# Patient Record
Sex: FEMALE | Race: BLACK OR AFRICAN AMERICAN | NOT HISPANIC OR LATINO | Employment: FULL TIME | URBAN - METROPOLITAN AREA
[De-identification: names, ages, dates, MRNs, and addresses within clinical notes are randomized per-mention and may not be internally consistent; named-entity substitution may affect disease eponyms.]

---

## 2018-04-04 ENCOUNTER — OFFICE VISIT CONVERTED (OUTPATIENT)
Dept: SURGERY | Facility: CLINIC | Age: 58
End: 2018-04-04
Attending: SURGERY

## 2018-05-03 ENCOUNTER — CONVERSION ENCOUNTER (OUTPATIENT)
Dept: GENERAL RADIOLOGY | Facility: HOSPITAL | Age: 58
End: 2018-05-03

## 2018-10-18 ENCOUNTER — HOSPITAL ENCOUNTER (OUTPATIENT)
Facility: HOSPITAL | Age: 58
Setting detail: HOSPITAL OUTPATIENT SURGERY
Discharge: HOME | End: 2018-10-18
Attending: INTERNAL MEDICINE | Admitting: INTERNAL MEDICINE

## 2018-10-18 ENCOUNTER — APPOINTMENT (OUTPATIENT)
Dept: GENERAL RADIOLOGY | Facility: HOSPITAL | Age: 58
End: 2018-10-18

## 2018-10-18 ENCOUNTER — ANESTHESIA EVENT (OUTPATIENT)
Dept: GASTROENTEROLOGY | Facility: HOSPITAL | Age: 58
End: 2018-10-18

## 2018-10-18 ENCOUNTER — ANESTHESIA (OUTPATIENT)
Dept: GASTROENTEROLOGY | Facility: HOSPITAL | Age: 58
End: 2018-10-18

## 2018-10-18 VITALS
BODY MASS INDEX: 33.49 KG/M2 | RESPIRATION RATE: 20 BRPM | DIASTOLIC BLOOD PRESSURE: 97 MMHG | TEMPERATURE: 97.9 F | SYSTOLIC BLOOD PRESSURE: 139 MMHG | WEIGHT: 189 LBS | HEIGHT: 63 IN | HEART RATE: 98 BPM | OXYGEN SATURATION: 99 %

## 2018-10-18 DIAGNOSIS — J84.9 INTERSTITIAL LUNG DISEASE (HCC): ICD-10-CM

## 2018-10-18 DIAGNOSIS — R06.2 WHEEZING: Primary | ICD-10-CM

## 2018-10-18 DIAGNOSIS — R05.3 CHRONIC COUGH: ICD-10-CM

## 2018-10-18 LAB
APPEARANCE FLD: ABNORMAL
COLOR FLD: ABNORMAL
GIE STN SPEC: NORMAL
GLUCOSE BLDC GLUCOMTR-MCNC: 127 MG/DL (ref 70–130)
LYMPHOCYTES NFR FLD MANUAL: 1 %
MONOS+MACROS NFR FLD: 91 %
NEUTROPHILS NFR FLD MANUAL: 8 %
OTHER CELLS FLUID PER 100/WBCS: 2 /100 WBCS
RBC # FLD AUTO: ABNORMAL /MM3
WBC # FLD: 23 /MM3

## 2018-10-18 PROCEDURE — 71045 X-RAY EXAM CHEST 1 VIEW: CPT

## 2018-10-18 PROCEDURE — 89051 BODY FLUID CELL COUNT: CPT | Performed by: INTERNAL MEDICINE

## 2018-10-18 PROCEDURE — 87206 SMEAR FLUORESCENT/ACID STAI: CPT | Performed by: INTERNAL MEDICINE

## 2018-10-18 PROCEDURE — 76000 FLUOROSCOPY <1 HR PHYS/QHP: CPT

## 2018-10-18 PROCEDURE — 88305 TISSUE EXAM BY PATHOLOGIST: CPT | Performed by: INTERNAL MEDICINE

## 2018-10-18 PROCEDURE — 25010000002 PROPOFOL 1000 MG/ML EMULSION: Performed by: ANESTHESIOLOGY

## 2018-10-18 PROCEDURE — 88112 CYTOPATH CELL ENHANCE TECH: CPT | Performed by: INTERNAL MEDICINE

## 2018-10-18 PROCEDURE — 87070 CULTURE OTHR SPECIMN AEROBIC: CPT | Performed by: INTERNAL MEDICINE

## 2018-10-18 PROCEDURE — 87071 CULTURE AEROBIC QUANT OTHER: CPT | Performed by: INTERNAL MEDICINE

## 2018-10-18 PROCEDURE — 87205 SMEAR GRAM STAIN: CPT | Performed by: INTERNAL MEDICINE

## 2018-10-18 PROCEDURE — 87102 FUNGUS ISOLATION CULTURE: CPT | Performed by: INTERNAL MEDICINE

## 2018-10-18 PROCEDURE — 82962 GLUCOSE BLOOD TEST: CPT

## 2018-10-18 PROCEDURE — 87176 TISSUE HOMOGENIZATION CULTR: CPT | Performed by: INTERNAL MEDICINE

## 2018-10-18 PROCEDURE — 25010000002 PROPOFOL 10 MG/ML EMULSION: Performed by: ANESTHESIOLOGY

## 2018-10-18 PROCEDURE — 87116 MYCOBACTERIA CULTURE: CPT | Performed by: INTERNAL MEDICINE

## 2018-10-18 RX ORDER — SUCRALFATE ORAL 1 G/10ML
1 SUSPENSION ORAL 4 TIMES DAILY
COMMUNITY
End: 2021-07-27

## 2018-10-18 RX ORDER — BACLOFEN 10 MG/1
10 TABLET ORAL 3 TIMES DAILY
COMMUNITY
End: 2022-04-04 | Stop reason: SDUPTHER

## 2018-10-18 RX ORDER — SODIUM CHLORIDE 0.9 % (FLUSH) 0.9 %
3 SYRINGE (ML) INJECTION EVERY 12 HOURS SCHEDULED
Status: DISCONTINUED | OUTPATIENT
Start: 2018-10-18 | End: 2018-10-18 | Stop reason: HOSPADM

## 2018-10-18 RX ORDER — LIDOCAINE HYDROCHLORIDE 10 MG/ML
INJECTION, SOLUTION EPIDURAL; INFILTRATION; INTRACAUDAL; PERINEURAL AS NEEDED
Status: DISCONTINUED | OUTPATIENT
Start: 2018-10-18 | End: 2018-10-18 | Stop reason: HOSPADM

## 2018-10-18 RX ORDER — PANTOPRAZOLE SODIUM 40 MG/1
40 TABLET, DELAYED RELEASE ORAL DAILY
Qty: 30 TABLET | Refills: 2 | Status: SHIPPED | OUTPATIENT
Start: 2018-10-18 | End: 2018-10-18

## 2018-10-18 RX ORDER — DICLOFENAC SODIUM AND MISOPROSTOL 75; 200 MG/1; UG/1
1 TABLET, DELAYED RELEASE ORAL 2 TIMES DAILY
COMMUNITY
End: 2021-07-27

## 2018-10-18 RX ORDER — LIDOCAINE HYDROCHLORIDE 20 MG/ML
INJECTION, SOLUTION INFILTRATION; PERINEURAL AS NEEDED
Status: DISCONTINUED | OUTPATIENT
Start: 2018-10-18 | End: 2018-10-18 | Stop reason: SURG

## 2018-10-18 RX ORDER — SODIUM CHLORIDE 9 MG/ML
125 INJECTION, SOLUTION INTRAVENOUS CONTINUOUS
Status: DISCONTINUED | OUTPATIENT
Start: 2018-10-18 | End: 2018-10-18 | Stop reason: HOSPADM

## 2018-10-18 RX ORDER — FERROUS SULFATE 325(65) MG
325 TABLET ORAL
COMMUNITY
End: 2022-04-04

## 2018-10-18 RX ORDER — ALBUTEROL SULFATE 2.5 MG/3ML
2.5 SOLUTION RESPIRATORY (INHALATION) 4 TIMES DAILY PRN
Qty: 25 VIAL | Refills: 5 | Status: SHIPPED | OUTPATIENT
Start: 2018-10-18 | End: 2018-10-18

## 2018-10-18 RX ORDER — PANTOPRAZOLE SODIUM 40 MG/1
40 TABLET, DELAYED RELEASE ORAL DAILY
Qty: 30 TABLET | Refills: 2 | Status: SHIPPED | OUTPATIENT
Start: 2018-10-18 | End: 2022-05-16

## 2018-10-18 RX ORDER — SODIUM CHLORIDE 0.9 % (FLUSH) 0.9 %
3-10 SYRINGE (ML) INJECTION AS NEEDED
Status: DISCONTINUED | OUTPATIENT
Start: 2018-10-18 | End: 2018-10-18 | Stop reason: HOSPADM

## 2018-10-18 RX ORDER — PROPOFOL 10 MG/ML
VIAL (ML) INTRAVENOUS AS NEEDED
Status: DISCONTINUED | OUTPATIENT
Start: 2018-10-18 | End: 2018-10-18 | Stop reason: SURG

## 2018-10-18 RX ORDER — LOSARTAN POTASSIUM AND HYDROCHLOROTHIAZIDE 12.5; 5 MG/1; MG/1
1 TABLET ORAL DAILY
COMMUNITY
End: 2021-07-27

## 2018-10-18 RX ORDER — ALBUTEROL SULFATE 2.5 MG/3ML
2.5 SOLUTION RESPIRATORY (INHALATION) 4 TIMES DAILY PRN
Qty: 25 VIAL | Refills: 5 | Status: SHIPPED | OUTPATIENT
Start: 2018-10-18 | End: 2022-05-16

## 2018-10-18 RX ADMIN — SODIUM CHLORIDE 125 ML/HR: 9 INJECTION, SOLUTION INTRAVENOUS at 10:43

## 2018-10-18 RX ADMIN — PROPOFOL 180 MG: 10 INJECTION, EMULSION INTRAVENOUS at 12:00

## 2018-10-18 RX ADMIN — LIDOCAINE HYDROCHLORIDE 60 MG: 20 INJECTION, SOLUTION INFILTRATION; PERINEURAL at 12:00

## 2018-10-18 RX ADMIN — PROPOFOL 200 MCG/KG/MIN: 10 INJECTION, EMULSION INTRAVENOUS at 12:00

## 2018-10-18 NOTE — ANESTHESIA POSTPROCEDURE EVALUATION
"Patient: Silvia Massey    Procedure Summary     Date:  10/18/18 Room / Location:  [facility] ENDOSCOPY 7 /  [facility]    Anesthesia Start:  1155 Anesthesia Stop:  1241    Procedure:  BRONCHOSCOPY WITH BIOPSIES, BAL (N/A Bronchus) Diagnosis:      Surgeon:  Rayray Black MD Provider:  Natalia Bender MD    Anesthesia Type:  general ASA Status:  3          Anesthesia Type: general  Last vitals  BP   (!) 144/104 (10/18/18 1044)   Temp   36.6 °C (97.9 °F) (10/18/18 1036)   Pulse   97 (10/18/18 1044)   Resp   18 (10/18/18 1036)     SpO2   97 % (10/18/18 1036)     Post Anesthesia Care and Evaluation    Patient location during evaluation: bedside  Patient participation: complete - patient participated  Level of consciousness: awake and alert  Pain management: adequate  Airway patency: patent  Anesthetic complications: No anesthetic complications  PONV Status: none  Cardiovascular status: acceptable  Respiratory status: acceptable  Hydration status: acceptable    Comments: BP (!) 144/104 (BP Location: Left arm, Patient Position: Lying)   Pulse 97   Temp 36.6 °C (97.9 °F) (Oral)   Resp 18   Ht 160 cm (63\")   Wt 85.7 kg (189 lb)   SpO2 97%   BMI 33.48 kg/m²         "

## 2018-10-18 NOTE — OP NOTE
Bronchoscopy Procedure Note    Procedure:  1. Bronchoscopy, Diagnostic    Pre-Operative Diagnosis:  SOA and chronic cough with interstitial lung disease on CT chest    Post-Operative Diagnosis: Same    Indication:  SOA and chronic cough with interstitial lung disease on CT chest    Anesthesia: Monitored Anesthesia Care (MAC)    Procedure Details: Patient was consented for the procedure with all risk and benefit of the procedure explained in detail.  Patient was given the opportunity to ask questions and all concerns were answered.  The bronchocope was inserted into the main airway via the LMA. An anatomical survey was done of the main airways and the subsegmental bronchus to at least the first subsegmental level of all five lobes of both lungs.  The findings are reported below.  A bronchoalveolar lavage was performed using aliquots of normal saline instilled into the airways then aspirated back.    Findings:  Bronchoscope passed through LMA to the level of the vocal cords.  Lidocaine used for local anesthetic over vocal cords.  Very irritated and injected vasculature of the area around the vocal cords and laryngeal pillars with signs of silent aspiration and reflux.  Bronchoscope was passed between the vocal cords into the trachea.  All airways were visualized to at least the first subsegment level of all 5 lobes of both lungs.  Airways were of normal size and caliber.  No endobronchial lesions seen.  Very irritated central airways noted with signs of chronic aspiration with GERD as the likely cause.      Fluoroscopically guided transbronchial biopsies performed in the left lower lobe x 7.  Bronchial alveolar lavage performed in the left lower lobe and right lower lobe with 180cc saline instilled and 65cc blood tinged return.      Estimated Blood Loss:  Minimal           Specimens:  As above                Complications:  None; patient tolerated the procedure well.           Disposition: PACU - hemodynamically  stable.      Patient tolerated the procedure well.    Rayray Black MD  10/18/2018  12:20 PM

## 2018-10-18 NOTE — ANESTHESIA PREPROCEDURE EVALUATION
Anesthesia Evaluation     Patient summary reviewed   NPO Solid Status: > 8 hours  NPO Liquid Status: > 8 hours           Airway   Mallampati: I  TM distance: >3 FB  Dental      Pulmonary    (+) pneumonia , shortness of breath, sleep apnea,   Cardiovascular     Rhythm: regular  Rate: normal    (+) hypertension,       Neuro/Psych  GI/Hepatic/Renal/Endo    (+) obesity,  GERD,  diabetes mellitus,     Musculoskeletal     Abdominal    Substance History      OB/GYN          Other                        Anesthesia Plan    ASA 3     general   total IV anesthesia  intravenous induction   Anesthetic plan, all risks, benefits, and alternatives have been provided, discussed and informed consent has been obtained with: patient.

## 2018-10-19 LAB
CYTO UR: NORMAL
LAB AP CASE REPORT: NORMAL
LAB AP INTRADEPARTMENTAL CONSULT: NORMAL
PATH REPORT.FINAL DX SPEC: NORMAL
PATH REPORT.GROSS SPEC: NORMAL
REF LAB TEST METHOD: NORMAL

## 2018-10-20 LAB
BACTERIA SPEC AEROBE CULT: NORMAL
GRAM STN SPEC: NORMAL

## 2018-10-21 LAB — BACTERIA SPEC AEROBE CULT: NORMAL

## 2018-10-30 LAB
CYTO UR: NORMAL
FUNGUS WND CULT: ABNORMAL
LAB AP CASE REPORT: NORMAL
LAB AP CLINICAL INFORMATION: NORMAL
PATH REPORT.ADDENDUM SPEC: NORMAL
PATH REPORT.FINAL DX SPEC: NORMAL
PATH REPORT.GROSS SPEC: NORMAL

## 2018-10-31 ENCOUNTER — CONVERSION ENCOUNTER (OUTPATIENT)
Dept: INTERNAL MEDICINE | Facility: CLINIC | Age: 58
End: 2018-10-31

## 2018-10-31 ENCOUNTER — OFFICE VISIT CONVERTED (OUTPATIENT)
Dept: INTERNAL MEDICINE | Facility: CLINIC | Age: 58
End: 2018-10-31
Attending: INTERNAL MEDICINE

## 2018-11-01 ENCOUNTER — OFFICE VISIT CONVERTED (OUTPATIENT)
Dept: CARDIOLOGY | Facility: CLINIC | Age: 58
End: 2018-11-01
Attending: INTERNAL MEDICINE

## 2018-11-15 LAB — FUNGUS WND CULT: NORMAL

## 2018-11-29 LAB
MYCOBACTERIUM SPEC CULT: NORMAL
MYCOBACTERIUM SPEC CULT: NORMAL
NIGHT BLUE STAIN TISS: NORMAL
NIGHT BLUE STAIN TISS: NORMAL

## 2018-12-05 ENCOUNTER — OFFICE VISIT CONVERTED (OUTPATIENT)
Dept: CARDIOLOGY | Facility: CLINIC | Age: 58
End: 2018-12-05
Attending: INTERNAL MEDICINE

## 2018-12-05 ENCOUNTER — OFFICE VISIT CONVERTED (OUTPATIENT)
Dept: INTERNAL MEDICINE | Facility: CLINIC | Age: 58
End: 2018-12-05
Attending: INTERNAL MEDICINE

## 2019-01-01 ENCOUNTER — HOSPITAL ENCOUNTER (OUTPATIENT)
Dept: OTHER | Facility: HOSPITAL | Age: 59
Setting detail: RECURRING SERIES
Discharge: HOME | End: 2019-01-01
Attending: INTERNAL MEDICINE

## 2019-01-09 ENCOUNTER — OFFICE VISIT CONVERTED (OUTPATIENT)
Dept: INTERNAL MEDICINE | Facility: CLINIC | Age: 59
End: 2019-01-09
Attending: INTERNAL MEDICINE

## 2019-01-18 ENCOUNTER — HOSPITAL ENCOUNTER (OUTPATIENT)
Dept: ULTRASOUND IMAGING | Facility: HOSPITAL | Age: 59
Discharge: HOME | End: 2019-01-18
Attending: INTERNAL MEDICINE

## 2019-02-01 ENCOUNTER — OFFICE VISIT CONVERTED (OUTPATIENT)
Dept: PULMONOLOGY | Facility: CLINIC | Age: 59
End: 2019-02-01
Attending: INTERNAL MEDICINE

## 2019-02-01 ENCOUNTER — HOSPITAL ENCOUNTER (OUTPATIENT)
Dept: LAB | Facility: HOSPITAL | Age: 59
Discharge: HOME | End: 2019-02-01
Attending: INTERNAL MEDICINE

## 2019-02-05 LAB — IGE SERPL-ACNC: 55 K[IU]/ML (ref 0–24)

## 2019-02-11 ENCOUNTER — OFFICE VISIT CONVERTED (OUTPATIENT)
Dept: INTERNAL MEDICINE | Facility: CLINIC | Age: 59
End: 2019-02-11
Attending: INTERNAL MEDICINE

## 2019-02-11 ENCOUNTER — HOSPITAL ENCOUNTER (OUTPATIENT)
Dept: OTHER | Facility: HOSPITAL | Age: 59
Discharge: HOME | End: 2019-02-11
Attending: INTERNAL MEDICINE

## 2019-02-11 ENCOUNTER — CONVERSION ENCOUNTER (OUTPATIENT)
Dept: INTERNAL MEDICINE | Facility: CLINIC | Age: 59
End: 2019-02-11

## 2019-02-11 LAB
ALBUMIN SERPL-MCNC: 4.8 G/DL (ref 3.5–5)
ALBUMIN/GLOB SERPL: 1.7 {RATIO} (ref 1.4–2.6)
ALP SERPL-CCNC: 79 U/L (ref 53–141)
ALT SERPL-CCNC: 16 U/L (ref 10–40)
ANION GAP SERPL CALC-SCNC: 17 MMOL/L (ref 8–19)
AST SERPL-CCNC: 15 U/L (ref 15–50)
BASOPHILS # BLD AUTO: 0.07 10*3/UL (ref 0–0.2)
BASOPHILS NFR BLD AUTO: 1.01 % (ref 0–3)
BILIRUB SERPL-MCNC: 0.28 MG/DL (ref 0.2–1.3)
BUN SERPL-MCNC: 16 MG/DL (ref 5–25)
BUN/CREAT SERPL: 16 {RATIO} (ref 6–20)
CALCIUM SERPL-MCNC: 10.3 MG/DL (ref 8.7–10.4)
CHLORIDE SERPL-SCNC: 105 MMOL/L (ref 99–111)
CHOLEST SERPL-MCNC: 116 MG/DL (ref 107–200)
CHOLEST/HDLC SERPL: 2 {RATIO} (ref 3–6)
CONV CO2: 26 MMOL/L (ref 22–32)
CONV CREATININE URINE, RANDOM: 60.9 MG/DL (ref 10–300)
CONV MICROALBUM.,U,RANDOM: <12 MG/L (ref 0–20)
CONV TOTAL PROTEIN: 7.6 G/DL (ref 6.3–8.2)
CREAT UR-MCNC: 1 MG/DL (ref 0.5–0.9)
EOSINOPHIL # BLD AUTO: 0.14 10*3/UL (ref 0–0.7)
EOSINOPHIL # BLD AUTO: 2.18 % (ref 0–7)
ERYTHROCYTE [DISTWIDTH] IN BLOOD BY AUTOMATED COUNT: 13.5 % (ref 11.5–14.5)
EST. AVERAGE GLUCOSE BLD GHB EST-MCNC: 169 MG/DL
GFR SERPLBLD BASED ON 1.73 SQ M-ARVRAT: >60 ML/MIN/{1.73_M2}
GLOBULIN UR ELPH-MCNC: 2.8 G/DL (ref 2–3.5)
GLUCOSE SERPL-MCNC: 183 MG/DL (ref 65–99)
HBA1C MFR BLD: 14.5 G/DL (ref 12–16)
HBA1C MFR BLD: 7.5 % (ref 3.5–5.7)
HCT VFR BLD AUTO: 42.8 % (ref 37–47)
HDLC SERPL-MCNC: 58 MG/DL (ref 40–60)
LDLC SERPL CALC-MCNC: 34 MG/DL (ref 70–100)
LYMPHOCYTES # BLD AUTO: 2.69 10*3/UL (ref 1–5)
MCH RBC QN AUTO: 28.4 PG (ref 27–31)
MCHC RBC AUTO-ENTMCNC: 33.8 G/DL (ref 33–37)
MCV RBC AUTO: 84.2 FL (ref 81–99)
MICROALBUMIN/CREAT UR: 19.7 MG/G{CRE} (ref 0–35)
MONOCYTES # BLD AUTO: 0.35 10*3/UL (ref 0.2–1.2)
MONOCYTES NFR BLD AUTO: 5.37 % (ref 3–10)
NEUTROPHILS # BLD AUTO: 3.26 10*3/UL (ref 2–8)
NEUTROPHILS NFR BLD AUTO: 50.1 % (ref 30–85)
NRBC BLD AUTO-RTO: 0 % (ref 0–0.01)
OSMOLALITY SERPL CALC.SUM OF ELEC: 304 MOSM/KG (ref 273–304)
PLATELET # BLD AUTO: 210 10*3/UL (ref 130–400)
PMV BLD AUTO: 8.8 FL (ref 7.4–10.4)
POTASSIUM SERPL-SCNC: 4.3 MMOL/L (ref 3.5–5.3)
RBC # BLD AUTO: 5.08 10*6/UL (ref 4.2–5.4)
SODIUM SERPL-SCNC: 144 MMOL/L (ref 135–147)
TRIGL SERPL-MCNC: 118 MG/DL (ref 40–150)
VARIANT LYMPHS NFR BLD MANUAL: 41.3 % (ref 20–45)
VLDLC SERPL-MCNC: 24 MG/DL (ref 5–37)
WBC # BLD AUTO: 6.51 10*3/UL (ref 4.8–10.8)

## 2019-02-26 ENCOUNTER — HOSPITAL ENCOUNTER (OUTPATIENT)
Dept: CARDIOLOGY | Facility: HOSPITAL | Age: 59
Discharge: HOME | End: 2019-02-26
Attending: INTERNAL MEDICINE

## 2019-02-26 ENCOUNTER — OFFICE VISIT CONVERTED (OUTPATIENT)
Dept: SURGERY | Facility: CLINIC | Age: 59
End: 2019-02-26
Attending: SURGERY

## 2019-03-11 ENCOUNTER — OFFICE VISIT CONVERTED (OUTPATIENT)
Dept: PLASTIC SURGERY | Facility: CLINIC | Age: 59
End: 2019-03-11
Attending: PLASTIC SURGERY

## 2019-03-15 ENCOUNTER — HOSPITAL ENCOUNTER (OUTPATIENT)
Dept: GENERAL RADIOLOGY | Facility: HOSPITAL | Age: 59
Discharge: HOME | End: 2019-03-15
Attending: PLASTIC SURGERY

## 2019-04-01 ENCOUNTER — OFFICE VISIT CONVERTED (OUTPATIENT)
Dept: PLASTIC SURGERY | Facility: CLINIC | Age: 59
End: 2019-04-01
Attending: PLASTIC SURGERY

## 2019-04-03 ENCOUNTER — HOSPITAL ENCOUNTER (OUTPATIENT)
Dept: OTHER | Facility: HOSPITAL | Age: 59
Discharge: HOME | End: 2019-04-03
Attending: INTERNAL MEDICINE

## 2019-04-03 LAB
ANION GAP SERPL CALC-SCNC: 21 MMOL/L (ref 8–19)
BUN SERPL-MCNC: 27 MG/DL (ref 5–25)
BUN/CREAT SERPL: 27 {RATIO} (ref 6–20)
CALCIUM SERPL-MCNC: 10.3 MG/DL (ref 8.7–10.4)
CHLORIDE SERPL-SCNC: 98 MMOL/L (ref 99–111)
CONV CO2: 22 MMOL/L (ref 22–32)
CREAT UR-MCNC: 1 MG/DL (ref 0.5–0.9)
GFR SERPLBLD BASED ON 1.73 SQ M-ARVRAT: >60 ML/MIN/{1.73_M2}
GLUCOSE SERPL-MCNC: 133 MG/DL (ref 65–99)
OSMOLALITY SERPL CALC.SUM OF ELEC: 289 MOSM/KG (ref 273–304)
POTASSIUM SERPL-SCNC: 4.5 MMOL/L (ref 3.5–5.3)
SODIUM SERPL-SCNC: 136 MMOL/L (ref 135–147)

## 2019-04-10 ENCOUNTER — OFFICE VISIT CONVERTED (OUTPATIENT)
Dept: INTERNAL MEDICINE | Facility: CLINIC | Age: 59
End: 2019-04-10
Attending: INTERNAL MEDICINE

## 2019-04-10 ENCOUNTER — OFFICE VISIT CONVERTED (OUTPATIENT)
Dept: CARDIOLOGY | Facility: CLINIC | Age: 59
End: 2019-04-10
Attending: INTERNAL MEDICINE

## 2019-05-15 ENCOUNTER — OFFICE VISIT CONVERTED (OUTPATIENT)
Dept: INTERNAL MEDICINE | Facility: CLINIC | Age: 59
End: 2019-05-15
Attending: INTERNAL MEDICINE

## 2019-05-15 ENCOUNTER — CONVERSION ENCOUNTER (OUTPATIENT)
Dept: INTERNAL MEDICINE | Facility: CLINIC | Age: 59
End: 2019-05-15

## 2019-05-15 ENCOUNTER — HOSPITAL ENCOUNTER (OUTPATIENT)
Dept: OTHER | Facility: HOSPITAL | Age: 59
Discharge: HOME | End: 2019-05-15
Attending: INTERNAL MEDICINE

## 2019-05-15 LAB
ALBUMIN SERPL-MCNC: 4.8 G/DL (ref 3.5–5)
ALBUMIN/GLOB SERPL: 1.7 {RATIO} (ref 1.4–2.6)
ALP SERPL-CCNC: 65 U/L (ref 53–141)
ALT SERPL-CCNC: 15 U/L (ref 10–40)
ANION GAP SERPL CALC-SCNC: 17 MMOL/L (ref 8–19)
AST SERPL-CCNC: 17 U/L (ref 15–50)
BASOPHILS # BLD AUTO: 0.06 10*3/UL (ref 0–0.2)
BASOPHILS NFR BLD AUTO: 1.2 % (ref 0–3)
BILIRUB SERPL-MCNC: 0.32 MG/DL (ref 0.2–1.3)
BUN SERPL-MCNC: 18 MG/DL (ref 5–25)
BUN/CREAT SERPL: 23 {RATIO} (ref 6–20)
CALCIUM SERPL-MCNC: 10.4 MG/DL (ref 8.7–10.4)
CHLORIDE SERPL-SCNC: 105 MMOL/L (ref 99–111)
CHOLEST SERPL-MCNC: 134 MG/DL (ref 107–200)
CHOLEST/HDLC SERPL: 2.1 {RATIO} (ref 3–6)
CONV ABS IMM GRAN: 0 10*3/UL (ref 0–0.2)
CONV CO2: 25 MMOL/L (ref 22–32)
CONV IMMATURE GRAN: 0 % (ref 0–1.8)
CONV TOTAL PROTEIN: 7.6 G/DL (ref 6.3–8.2)
CREAT UR-MCNC: 0.77 MG/DL (ref 0.5–0.9)
DEPRECATED RDW RBC AUTO: 42.5 FL (ref 36.4–46.3)
EOSINOPHIL # BLD AUTO: 0.13 10*3/UL (ref 0–0.7)
EOSINOPHIL # BLD AUTO: 2.6 % (ref 0–7)
ERYTHROCYTE [DISTWIDTH] IN BLOOD BY AUTOMATED COUNT: 13.7 % (ref 11.7–14.4)
EST. AVERAGE GLUCOSE BLD GHB EST-MCNC: 154 MG/DL
GFR SERPLBLD BASED ON 1.73 SQ M-ARVRAT: >60 ML/MIN/{1.73_M2}
GLOBULIN UR ELPH-MCNC: 2.8 G/DL (ref 2–3.5)
GLUCOSE SERPL-MCNC: 114 MG/DL (ref 65–99)
HBA1C MFR BLD: 13.6 G/DL (ref 12–16)
HBA1C MFR BLD: 7 % (ref 3.5–5.7)
HCT VFR BLD AUTO: 43 % (ref 37–47)
HDLC SERPL-MCNC: 65 MG/DL (ref 40–60)
LDLC SERPL CALC-MCNC: 55 MG/DL (ref 70–100)
LYMPHOCYTES # BLD AUTO: 2.09 10*3/UL (ref 1–5)
MCH RBC QN AUTO: 26.9 PG (ref 27–31)
MCHC RBC AUTO-ENTMCNC: 31.6 G/DL (ref 33–37)
MCV RBC AUTO: 85.1 FL (ref 81–99)
MONOCYTES # BLD AUTO: 0.3 10*3/UL (ref 0.2–1.2)
MONOCYTES NFR BLD AUTO: 6 % (ref 3–10)
NEUTROPHILS # BLD AUTO: 2.45 10*3/UL (ref 2–8)
NEUTROPHILS NFR BLD AUTO: 48.6 % (ref 30–85)
NRBC CBCN: 0 % (ref 0–0.7)
OSMOLALITY SERPL CALC.SUM OF ELEC: 299 MOSM/KG (ref 273–304)
PLATELET # BLD AUTO: 224 10*3/UL (ref 130–400)
PMV BLD AUTO: 11.6 FL (ref 9.4–12.3)
POTASSIUM SERPL-SCNC: 4.3 MMOL/L (ref 3.5–5.3)
RBC # BLD AUTO: 5.05 10*6/UL (ref 4.2–5.4)
SODIUM SERPL-SCNC: 143 MMOL/L (ref 135–147)
TRIGL SERPL-MCNC: 71 MG/DL (ref 40–150)
VARIANT LYMPHS NFR BLD MANUAL: 41.6 % (ref 20–45)
VLDLC SERPL-MCNC: 14 MG/DL (ref 5–37)
WBC # BLD AUTO: 5.03 10*3/UL (ref 4.8–10.8)

## 2019-05-16 LAB
25(OH)D3 SERPL-MCNC: 32.7 NG/ML (ref 30–100)
FERRITIN SERPL-MCNC: 289 NG/ML (ref 10–200)
IRON SATN MFR SERPL: 24 % (ref 20–55)
IRON SERPL-MCNC: 96 UG/DL (ref 60–170)
TIBC SERPL-MCNC: 398 UG/DL (ref 245–450)
TRANSFERRIN SERPL-MCNC: 278 MG/DL (ref 250–380)

## 2019-05-31 ENCOUNTER — OFFICE VISIT CONVERTED (OUTPATIENT)
Dept: OTOLARYNGOLOGY | Facility: CLINIC | Age: 59
End: 2019-05-31
Attending: OTOLARYNGOLOGY

## 2019-06-17 ENCOUNTER — HOSPITAL ENCOUNTER (OUTPATIENT)
Dept: GENERAL RADIOLOGY | Facility: HOSPITAL | Age: 59
Discharge: HOME | End: 2019-06-17
Attending: INTERNAL MEDICINE

## 2019-06-24 ENCOUNTER — OFFICE VISIT CONVERTED (OUTPATIENT)
Dept: PLASTIC SURGERY | Facility: CLINIC | Age: 59
End: 2019-06-24
Attending: PLASTIC SURGERY

## 2019-06-24 ENCOUNTER — CONVERSION ENCOUNTER (OUTPATIENT)
Dept: PLASTIC SURGERY | Facility: CLINIC | Age: 59
End: 2019-06-24

## 2019-07-09 ENCOUNTER — PROCEDURE VISIT CONVERTED (OUTPATIENT)
Dept: OTHER | Facility: HOSPITAL | Age: 59
End: 2019-07-09
Attending: PLASTIC SURGERY

## 2019-07-09 ENCOUNTER — HOSPITAL ENCOUNTER (OUTPATIENT)
Dept: PERIOP | Facility: HOSPITAL | Age: 59
Setting detail: HOSPITAL OUTPATIENT SURGERY
Discharge: HOME | End: 2019-07-09
Attending: PLASTIC SURGERY

## 2019-07-09 LAB
GLUCOSE BLD-MCNC: 112 MG/DL (ref 65–99)
GLUCOSE BLD-MCNC: 123 MG/DL (ref 65–99)

## 2019-07-19 ENCOUNTER — OFFICE VISIT CONVERTED (OUTPATIENT)
Dept: PLASTIC SURGERY | Facility: CLINIC | Age: 59
End: 2019-07-19
Attending: PLASTIC SURGERY

## 2019-07-26 ENCOUNTER — OFFICE VISIT CONVERTED (OUTPATIENT)
Dept: PLASTIC SURGERY | Facility: CLINIC | Age: 59
End: 2019-07-26
Attending: PLASTIC SURGERY

## 2019-08-23 ENCOUNTER — OFFICE VISIT CONVERTED (OUTPATIENT)
Dept: OTOLARYNGOLOGY | Facility: CLINIC | Age: 59
End: 2019-08-23
Attending: OTOLARYNGOLOGY

## 2019-08-26 ENCOUNTER — OFFICE VISIT CONVERTED (OUTPATIENT)
Dept: PLASTIC SURGERY | Facility: CLINIC | Age: 59
End: 2019-08-26
Attending: PLASTIC SURGERY

## 2019-09-25 ENCOUNTER — CONVERSION ENCOUNTER (OUTPATIENT)
Dept: NEUROLOGY | Facility: CLINIC | Age: 59
End: 2019-09-25

## 2019-09-25 ENCOUNTER — OFFICE VISIT CONVERTED (OUTPATIENT)
Dept: NEUROLOGY | Facility: CLINIC | Age: 59
End: 2019-09-25
Attending: PSYCHIATRY & NEUROLOGY

## 2019-10-15 ENCOUNTER — CONVERSION ENCOUNTER (OUTPATIENT)
Dept: CARDIOLOGY | Facility: CLINIC | Age: 59
End: 2019-10-15

## 2019-10-15 ENCOUNTER — CONVERSION ENCOUNTER (OUTPATIENT)
Dept: CARDIOLOGY | Facility: CLINIC | Age: 59
End: 2019-10-15
Attending: INTERNAL MEDICINE

## 2019-10-23 ENCOUNTER — HOSPITAL ENCOUNTER (OUTPATIENT)
Dept: GENERAL RADIOLOGY | Facility: HOSPITAL | Age: 59
Discharge: HOME | End: 2019-10-23

## 2019-10-23 ENCOUNTER — HOSPITAL ENCOUNTER (OUTPATIENT)
Dept: LAB | Facility: HOSPITAL | Age: 59
Discharge: HOME | End: 2019-10-23

## 2019-10-23 LAB
ALBUMIN SERPL-MCNC: 4.9 G/DL (ref 3.5–5)
ALBUMIN/GLOB SERPL: 1.8 {RATIO} (ref 1.4–2.6)
ALP SERPL-CCNC: 74 U/L (ref 53–141)
ALT SERPL-CCNC: 15 U/L (ref 10–40)
ANION GAP SERPL CALC-SCNC: 20 MMOL/L (ref 8–19)
AST SERPL-CCNC: 15 U/L (ref 15–50)
BASOPHILS # BLD AUTO: 0.06 10*3/UL (ref 0–0.2)
BASOPHILS NFR BLD AUTO: 1.1 % (ref 0–3)
BILIRUB SERPL-MCNC: 0.23 MG/DL (ref 0.2–1.3)
BUN SERPL-MCNC: 14 MG/DL (ref 5–25)
BUN/CREAT SERPL: 17 {RATIO} (ref 6–20)
CALCIUM SERPL-MCNC: 10.1 MG/DL (ref 8.7–10.4)
CHLORIDE SERPL-SCNC: 102 MMOL/L (ref 99–111)
CONV ABS IMM GRAN: 0 10*3/UL (ref 0–0.2)
CONV CO2: 24 MMOL/L (ref 22–32)
CONV IMMATURE GRAN: 0 % (ref 0–1.8)
CONV TOTAL PROTEIN: 7.6 G/DL (ref 6.3–8.2)
CREAT UR-MCNC: 0.83 MG/DL (ref 0.5–0.9)
DEPRECATED RDW RBC AUTO: 39.8 FL (ref 36.4–46.3)
EOSINOPHIL # BLD AUTO: 0.16 10*3/UL (ref 0–0.7)
EOSINOPHIL # BLD AUTO: 2.8 % (ref 0–7)
ERYTHROCYTE [DISTWIDTH] IN BLOOD BY AUTOMATED COUNT: 13 % (ref 11.7–14.4)
GFR SERPLBLD BASED ON 1.73 SQ M-ARVRAT: >60 ML/MIN/{1.73_M2}
GLOBULIN UR ELPH-MCNC: 2.7 G/DL (ref 2–3.5)
GLUCOSE SERPL-MCNC: 172 MG/DL (ref 65–99)
HCT VFR BLD AUTO: 39.7 % (ref 37–47)
HCYS SERPL-SCNC: 8.8 UMOL/L (ref 3.7–13.9)
HGB BLD-MCNC: 13 G/DL (ref 12–16)
LYMPHOCYTES # BLD AUTO: 2.4 10*3/UL (ref 1–5)
LYMPHOCYTES NFR BLD AUTO: 42.6 % (ref 20–45)
MCH RBC QN AUTO: 27.7 PG (ref 27–31)
MCHC RBC AUTO-ENTMCNC: 32.7 G/DL (ref 33–37)
MCV RBC AUTO: 84.5 FL (ref 81–99)
MONOCYTES # BLD AUTO: 0.37 10*3/UL (ref 0.2–1.2)
MONOCYTES NFR BLD AUTO: 6.6 % (ref 3–10)
NEUTROPHILS # BLD AUTO: 2.64 10*3/UL (ref 2–8)
NEUTROPHILS NFR BLD AUTO: 46.9 % (ref 30–85)
NRBC CBCN: 0 % (ref 0–0.7)
OSMOLALITY SERPL CALC.SUM OF ELEC: 299 MOSM/KG (ref 273–304)
PLATELET # BLD AUTO: 225 10*3/UL (ref 130–400)
PMV BLD AUTO: 11.4 FL (ref 9.4–12.3)
POTASSIUM SERPL-SCNC: 3.8 MMOL/L (ref 3.5–5.3)
RBC # BLD AUTO: 4.7 10*6/UL (ref 4.2–5.4)
SODIUM SERPL-SCNC: 142 MMOL/L (ref 135–147)
TSH SERPL-ACNC: 1.17 M[IU]/L (ref 0.27–4.2)
VIT B12 SERPL-MCNC: 996 PG/ML (ref 211–911)
WBC # BLD AUTO: 5.63 10*3/UL (ref 4.8–10.8)

## 2019-10-24 LAB
25(OH)D3 SERPL-MCNC: 23.7 NG/ML (ref 30–100)
CHOLEST SERPL-MCNC: 112 MG/DL (ref 107–200)
CHOLEST/HDLC SERPL: 1.8 {RATIO} (ref 3–6)
EST. AVERAGE GLUCOSE BLD GHB EST-MCNC: 163 MG/DL
HBA1C MFR BLD: 7.3 % (ref 3.5–5.7)
HDLC SERPL-MCNC: 61 MG/DL (ref 40–60)
IRON SATN MFR SERPL: 18 % (ref 20–55)
IRON SERPL-MCNC: 69 UG/DL (ref 60–170)
LDLC SERPL CALC-MCNC: 37 MG/DL (ref 70–100)
TIBC SERPL-MCNC: 373 UG/DL (ref 245–450)
TRANSFERRIN SERPL-MCNC: 261 MG/DL (ref 250–380)
TRIGL SERPL-MCNC: 71 MG/DL (ref 40–150)
VLDLC SERPL-MCNC: 14 MG/DL (ref 5–37)

## 2019-10-28 ENCOUNTER — OFFICE VISIT CONVERTED (OUTPATIENT)
Dept: PLASTIC SURGERY | Facility: CLINIC | Age: 59
End: 2019-10-28
Attending: PLASTIC SURGERY

## 2019-11-18 ENCOUNTER — OFFICE VISIT CONVERTED (OUTPATIENT)
Dept: INTERNAL MEDICINE | Facility: CLINIC | Age: 59
End: 2019-11-18
Attending: INTERNAL MEDICINE

## 2019-12-09 ENCOUNTER — HOSPITAL ENCOUNTER (OUTPATIENT)
Dept: OTHER | Facility: HOSPITAL | Age: 59
Discharge: HOME | End: 2019-12-09
Attending: PHYSICIAN ASSISTANT

## 2019-12-09 ENCOUNTER — OFFICE VISIT CONVERTED (OUTPATIENT)
Dept: INTERNAL MEDICINE | Facility: CLINIC | Age: 59
End: 2019-12-09
Attending: PHYSICIAN ASSISTANT

## 2019-12-11 LAB — BACTERIA SPEC AEROBE CULT: NORMAL

## 2020-03-04 ENCOUNTER — OFFICE VISIT CONVERTED (OUTPATIENT)
Dept: CARDIOLOGY | Facility: CLINIC | Age: 60
End: 2020-03-04
Attending: INTERNAL MEDICINE

## 2020-03-10 ENCOUNTER — HOSPITAL ENCOUNTER (OUTPATIENT)
Dept: OTHER | Facility: HOSPITAL | Age: 60
Discharge: HOME | End: 2020-03-10
Attending: INTERNAL MEDICINE

## 2020-03-10 ENCOUNTER — OFFICE VISIT CONVERTED (OUTPATIENT)
Dept: INTERNAL MEDICINE | Facility: CLINIC | Age: 60
End: 2020-03-10
Attending: INTERNAL MEDICINE

## 2020-03-10 LAB
ALBUMIN SERPL-MCNC: 4.8 G/DL (ref 3.5–5)
ALBUMIN/GLOB SERPL: 1.7 {RATIO} (ref 1.4–2.6)
ALP SERPL-CCNC: 76 U/L (ref 53–141)
ALT SERPL-CCNC: 31 U/L (ref 10–40)
ANION GAP SERPL CALC-SCNC: 16 MMOL/L (ref 8–19)
AST SERPL-CCNC: 21 U/L (ref 15–50)
BASOPHILS # BLD AUTO: 0.07 10*3/UL (ref 0–0.2)
BASOPHILS NFR BLD AUTO: 1.3 % (ref 0–3)
BILIRUB SERPL-MCNC: 0.31 MG/DL (ref 0.2–1.3)
BUN SERPL-MCNC: 10 MG/DL (ref 5–25)
BUN/CREAT SERPL: 14 {RATIO} (ref 6–20)
CALCIUM SERPL-MCNC: 10.2 MG/DL (ref 8.7–10.4)
CHLORIDE SERPL-SCNC: 101 MMOL/L (ref 99–111)
CHOLEST SERPL-MCNC: 106 MG/DL (ref 107–200)
CHOLEST/HDLC SERPL: 2 {RATIO} (ref 3–6)
CONV ABS IMM GRAN: 0.01 10*3/UL (ref 0–0.2)
CONV CO2: 26 MMOL/L (ref 22–32)
CONV CREATININE URINE, RANDOM: 90.1 MG/DL (ref 10–300)
CONV IMMATURE GRAN: 0.2 % (ref 0–1.8)
CONV MICROALBUM.,U,RANDOM: <12 MG/L (ref 0–20)
CONV TOTAL PROTEIN: 7.6 G/DL (ref 6.3–8.2)
CREAT UR-MCNC: 0.7 MG/DL (ref 0.5–0.9)
DEPRECATED RDW RBC AUTO: 42.5 FL (ref 36.4–46.3)
EOSINOPHIL # BLD AUTO: 0.14 10*3/UL (ref 0–0.7)
EOSINOPHIL # BLD AUTO: 2.6 % (ref 0–7)
ERYTHROCYTE [DISTWIDTH] IN BLOOD BY AUTOMATED COUNT: 13.6 % (ref 11.7–14.4)
EST. AVERAGE GLUCOSE BLD GHB EST-MCNC: 235 MG/DL
GFR SERPLBLD BASED ON 1.73 SQ M-ARVRAT: >60 ML/MIN/{1.73_M2}
GLOBULIN UR ELPH-MCNC: 2.8 G/DL (ref 2–3.5)
GLUCOSE SERPL-MCNC: 122 MG/DL (ref 65–99)
HBA1C MFR BLD: 9.8 % (ref 3.5–5.7)
HCT VFR BLD AUTO: 42.5 % (ref 37–47)
HDLC SERPL-MCNC: 52 MG/DL (ref 40–60)
HGB BLD-MCNC: 13.1 G/DL (ref 12–16)
LDLC SERPL CALC-MCNC: 36 MG/DL (ref 70–100)
LYMPHOCYTES # BLD AUTO: 2.19 10*3/UL (ref 1–5)
LYMPHOCYTES NFR BLD AUTO: 41.4 % (ref 20–45)
MCH RBC QN AUTO: 26.4 PG (ref 27–31)
MCHC RBC AUTO-ENTMCNC: 30.8 G/DL (ref 33–37)
MCV RBC AUTO: 85.7 FL (ref 81–99)
MICROALBUMIN/CREAT UR: 13.3 MG/G{CRE} (ref 0–35)
MONOCYTES # BLD AUTO: 0.36 10*3/UL (ref 0.2–1.2)
MONOCYTES NFR BLD AUTO: 6.8 % (ref 3–10)
NEUTROPHILS # BLD AUTO: 2.52 10*3/UL (ref 2–8)
NEUTROPHILS NFR BLD AUTO: 47.7 % (ref 30–85)
NRBC CBCN: 0 % (ref 0–0.7)
OSMOLALITY SERPL CALC.SUM OF ELEC: 288 MOSM/KG (ref 273–304)
PLATELET # BLD AUTO: 229 10*3/UL (ref 130–400)
PMV BLD AUTO: 11.3 FL (ref 9.4–12.3)
POTASSIUM SERPL-SCNC: 4.4 MMOL/L (ref 3.5–5.3)
RBC # BLD AUTO: 4.96 10*6/UL (ref 4.2–5.4)
SODIUM SERPL-SCNC: 139 MMOL/L (ref 135–147)
TRIGL SERPL-MCNC: 88 MG/DL (ref 40–150)
VLDLC SERPL-MCNC: 18 MG/DL (ref 5–37)
WBC # BLD AUTO: 5.29 10*3/UL (ref 4.8–10.8)

## 2020-04-06 ENCOUNTER — TELEMEDICINE CONVERTED (OUTPATIENT)
Dept: INTERNAL MEDICINE | Facility: CLINIC | Age: 60
End: 2020-04-06
Attending: PHYSICIAN ASSISTANT

## 2020-04-24 ENCOUNTER — TELEMEDICINE CONVERTED (OUTPATIENT)
Dept: INTERNAL MEDICINE | Facility: CLINIC | Age: 60
End: 2020-04-24
Attending: INTERNAL MEDICINE

## 2020-09-11 ENCOUNTER — OFFICE VISIT CONVERTED (OUTPATIENT)
Dept: INTERNAL MEDICINE | Facility: CLINIC | Age: 60
End: 2020-09-11
Attending: INTERNAL MEDICINE

## 2020-09-11 ENCOUNTER — HOSPITAL ENCOUNTER (OUTPATIENT)
Dept: OTHER | Facility: HOSPITAL | Age: 60
Discharge: HOME | End: 2020-09-11
Attending: INTERNAL MEDICINE

## 2020-09-11 ENCOUNTER — CONVERSION ENCOUNTER (OUTPATIENT)
Dept: INTERNAL MEDICINE | Facility: CLINIC | Age: 60
End: 2020-09-11

## 2020-09-11 LAB
BASOPHILS # BLD AUTO: 0.08 10*3/UL (ref 0–0.2)
BASOPHILS NFR BLD AUTO: 1 % (ref 0–3)
CONV ABS IMM GRAN: 0.02 10*3/UL (ref 0–0.2)
CONV IMMATURE GRAN: 0.3 % (ref 0–1.8)
DEPRECATED RDW RBC AUTO: 45.4 FL (ref 36.4–46.3)
EOSINOPHIL # BLD AUTO: 0.12 10*3/UL (ref 0–0.7)
EOSINOPHIL # BLD AUTO: 1.5 % (ref 0–7)
ERYTHROCYTE [DISTWIDTH] IN BLOOD BY AUTOMATED COUNT: 14.2 % (ref 11.7–14.4)
HCT VFR BLD AUTO: 43.8 % (ref 37–47)
HGB BLD-MCNC: 13.5 G/DL (ref 12–16)
LYMPHOCYTES # BLD AUTO: 2.45 10*3/UL (ref 1–5)
LYMPHOCYTES NFR BLD AUTO: 31.3 % (ref 20–45)
MCH RBC QN AUTO: 27.3 PG (ref 27–31)
MCHC RBC AUTO-ENTMCNC: 30.8 G/DL (ref 33–37)
MCV RBC AUTO: 88.5 FL (ref 81–99)
MONOCYTES # BLD AUTO: 0.62 10*3/UL (ref 0.2–1.2)
MONOCYTES NFR BLD AUTO: 7.9 % (ref 3–10)
NEUTROPHILS # BLD AUTO: 4.53 10*3/UL (ref 2–8)
NEUTROPHILS NFR BLD AUTO: 58 % (ref 30–85)
NRBC CBCN: 0 % (ref 0–0.7)
PLATELET # BLD AUTO: 246 10*3/UL (ref 130–400)
PMV BLD AUTO: 11.9 FL (ref 9.4–12.3)
RBC # BLD AUTO: 4.95 10*6/UL (ref 4.2–5.4)
WBC # BLD AUTO: 7.82 10*3/UL (ref 4.8–10.8)

## 2020-09-12 LAB
25(OH)D3 SERPL-MCNC: 28.8 NG/ML (ref 30–100)
ALBUMIN SERPL-MCNC: 4.8 G/DL (ref 3.5–5)
ALBUMIN/GLOB SERPL: 1.8 {RATIO} (ref 1.4–2.6)
ALP SERPL-CCNC: 59 U/L (ref 53–141)
ALT SERPL-CCNC: 15 U/L (ref 10–40)
ANION GAP SERPL CALC-SCNC: 16 MMOL/L (ref 8–19)
AST SERPL-CCNC: 17 U/L (ref 15–50)
BILIRUB SERPL-MCNC: 0.36 MG/DL (ref 0.2–1.3)
BUN SERPL-MCNC: 14 MG/DL (ref 5–25)
BUN/CREAT SERPL: 18 {RATIO} (ref 6–20)
CALCIUM SERPL-MCNC: 9.7 MG/DL (ref 8.7–10.4)
CHLORIDE SERPL-SCNC: 104 MMOL/L (ref 99–111)
CHOLEST SERPL-MCNC: 117 MG/DL (ref 107–200)
CHOLEST/HDLC SERPL: 1.8 {RATIO} (ref 3–6)
CONV CO2: 25 MMOL/L (ref 22–32)
CONV TOTAL PROTEIN: 7.4 G/DL (ref 6.3–8.2)
CREAT UR-MCNC: 0.8 MG/DL (ref 0.5–0.9)
EST. AVERAGE GLUCOSE BLD GHB EST-MCNC: 180 MG/DL
GFR SERPLBLD BASED ON 1.73 SQ M-ARVRAT: >60 ML/MIN/{1.73_M2}
GLOBULIN UR ELPH-MCNC: 2.6 G/DL (ref 2–3.5)
GLUCOSE SERPL-MCNC: 86 MG/DL (ref 65–99)
HBA1C MFR BLD: 7.9 % (ref 3.5–5.7)
HDLC SERPL-MCNC: 64 MG/DL (ref 40–60)
LDLC SERPL CALC-MCNC: 35 MG/DL (ref 70–100)
OSMOLALITY SERPL CALC.SUM OF ELEC: 292 MOSM/KG (ref 273–304)
POTASSIUM SERPL-SCNC: 3.8 MMOL/L (ref 3.5–5.3)
SODIUM SERPL-SCNC: 141 MMOL/L (ref 135–147)
TRIGL SERPL-MCNC: 90 MG/DL (ref 40–150)
VLDLC SERPL-MCNC: 18 MG/DL (ref 5–37)

## 2020-09-24 ENCOUNTER — CONVERSION ENCOUNTER (OUTPATIENT)
Dept: CARDIOLOGY | Facility: CLINIC | Age: 60
End: 2020-09-24

## 2020-09-24 ENCOUNTER — OFFICE VISIT CONVERTED (OUTPATIENT)
Dept: CARDIOLOGY | Facility: CLINIC | Age: 60
End: 2020-09-24
Attending: INTERNAL MEDICINE

## 2020-11-11 ENCOUNTER — HOSPITAL ENCOUNTER (OUTPATIENT)
Dept: GENERAL RADIOLOGY | Facility: HOSPITAL | Age: 60
Discharge: HOME | End: 2020-11-11
Attending: INTERNAL MEDICINE

## 2020-12-08 ENCOUNTER — OFFICE VISIT CONVERTED (OUTPATIENT)
Dept: INTERNAL MEDICINE | Facility: CLINIC | Age: 60
End: 2020-12-08
Attending: STUDENT IN AN ORGANIZED HEALTH CARE EDUCATION/TRAINING PROGRAM

## 2020-12-17 ENCOUNTER — HOSPITAL ENCOUNTER (OUTPATIENT)
Dept: OTHER | Facility: HOSPITAL | Age: 60
Discharge: HOME | End: 2020-12-17
Attending: INTERNAL MEDICINE

## 2020-12-17 ENCOUNTER — OFFICE VISIT CONVERTED (OUTPATIENT)
Dept: INTERNAL MEDICINE | Facility: CLINIC | Age: 60
End: 2020-12-17
Attending: INTERNAL MEDICINE

## 2020-12-17 LAB
ALBUMIN SERPL-MCNC: 4.8 G/DL (ref 3.5–5)
ALBUMIN/GLOB SERPL: 1.8 {RATIO} (ref 1.4–2.6)
ALP SERPL-CCNC: 73 U/L (ref 53–141)
ALT SERPL-CCNC: 31 U/L (ref 10–40)
ANION GAP SERPL CALC-SCNC: 16 MMOL/L (ref 8–19)
AST SERPL-CCNC: 25 U/L (ref 15–50)
BASOPHILS # BLD AUTO: 0.06 10*3/UL (ref 0–0.2)
BASOPHILS NFR BLD AUTO: 1 % (ref 0–3)
BILIRUB SERPL-MCNC: 0.26 MG/DL (ref 0.2–1.3)
BUN SERPL-MCNC: 17 MG/DL (ref 5–25)
BUN/CREAT SERPL: 20 {RATIO} (ref 6–20)
CALCIUM SERPL-MCNC: 10 MG/DL (ref 8.7–10.4)
CHLORIDE SERPL-SCNC: 104 MMOL/L (ref 99–111)
CHOLEST SERPL-MCNC: 145 MG/DL (ref 107–200)
CHOLEST/HDLC SERPL: 2.3 {RATIO} (ref 3–6)
CONV ABS IMM GRAN: 0.01 10*3/UL (ref 0–0.2)
CONV CO2: 26 MMOL/L (ref 22–32)
CONV IMMATURE GRAN: 0.2 % (ref 0–1.8)
CONV TOTAL PROTEIN: 7.5 G/DL (ref 6.3–8.2)
CREAT UR-MCNC: 0.86 MG/DL (ref 0.5–0.9)
DEPRECATED RDW RBC AUTO: 43.2 FL (ref 36.4–46.3)
EOSINOPHIL # BLD AUTO: 0.11 10*3/UL (ref 0–0.7)
EOSINOPHIL # BLD AUTO: 1.9 % (ref 0–7)
ERYTHROCYTE [DISTWIDTH] IN BLOOD BY AUTOMATED COUNT: 14.2 % (ref 11.7–14.4)
EST. AVERAGE GLUCOSE BLD GHB EST-MCNC: 192 MG/DL
GFR SERPLBLD BASED ON 1.73 SQ M-ARVRAT: >60 ML/MIN/{1.73_M2}
GLOBULIN UR ELPH-MCNC: 2.7 G/DL (ref 2–3.5)
GLUCOSE SERPL-MCNC: 221 MG/DL (ref 65–99)
HBA1C MFR BLD: 8.3 % (ref 3.5–5.7)
HCT VFR BLD AUTO: 43.4 % (ref 37–47)
HDLC SERPL-MCNC: 64 MG/DL (ref 40–60)
HGB BLD-MCNC: 14.1 G/DL (ref 12–16)
LDLC SERPL CALC-MCNC: 36 MG/DL (ref 70–100)
LYMPHOCYTES # BLD AUTO: 2.4 10*3/UL (ref 1–5)
LYMPHOCYTES NFR BLD AUTO: 41.1 % (ref 20–45)
MCH RBC QN AUTO: 27.2 PG (ref 27–31)
MCHC RBC AUTO-ENTMCNC: 32.5 G/DL (ref 33–37)
MCV RBC AUTO: 83.8 FL (ref 81–99)
MONOCYTES # BLD AUTO: 0.34 10*3/UL (ref 0.2–1.2)
MONOCYTES NFR BLD AUTO: 5.8 % (ref 3–10)
NEUTROPHILS # BLD AUTO: 2.92 10*3/UL (ref 2–8)
NEUTROPHILS NFR BLD AUTO: 50 % (ref 30–85)
NRBC CBCN: 0 % (ref 0–0.7)
OSMOLALITY SERPL CALC.SUM OF ELEC: 302 MOSM/KG (ref 273–304)
PLATELET # BLD AUTO: 232 10*3/UL (ref 130–400)
PMV BLD AUTO: 11.4 FL (ref 9.4–12.3)
POTASSIUM SERPL-SCNC: 3.9 MMOL/L (ref 3.5–5.3)
RBC # BLD AUTO: 5.18 10*6/UL (ref 4.2–5.4)
SODIUM SERPL-SCNC: 142 MMOL/L (ref 135–147)
TRIGL SERPL-MCNC: 225 MG/DL (ref 40–150)
VLDLC SERPL-MCNC: 45 MG/DL (ref 5–37)
WBC # BLD AUTO: 5.84 10*3/UL (ref 4.8–10.8)

## 2021-04-05 ENCOUNTER — OFFICE VISIT CONVERTED (OUTPATIENT)
Dept: INTERNAL MEDICINE | Facility: CLINIC | Age: 61
End: 2021-04-05
Attending: INTERNAL MEDICINE

## 2021-04-05 ENCOUNTER — HOSPITAL ENCOUNTER (OUTPATIENT)
Dept: OTHER | Facility: HOSPITAL | Age: 61
Discharge: HOME | End: 2021-04-05
Attending: INTERNAL MEDICINE

## 2021-04-05 LAB
ALBUMIN SERPL-MCNC: 4.8 G/DL (ref 3.5–5)
ALBUMIN/GLOB SERPL: 1.7 {RATIO} (ref 1.4–2.6)
ALP SERPL-CCNC: 82 U/L (ref 53–141)
ALT SERPL-CCNC: 31 U/L (ref 10–40)
ANION GAP SERPL CALC-SCNC: 19 MMOL/L (ref 8–19)
AST SERPL-CCNC: 22 U/L (ref 15–50)
BASOPHILS # BLD AUTO: 0.07 10*3/UL (ref 0–0.2)
BASOPHILS NFR BLD AUTO: 1.2 % (ref 0–3)
BILIRUB SERPL-MCNC: 0.33 MG/DL (ref 0.2–1.3)
BUN SERPL-MCNC: 13 MG/DL (ref 5–25)
BUN/CREAT SERPL: 15 {RATIO} (ref 6–20)
CALCIUM SERPL-MCNC: 10.1 MG/DL (ref 8.7–10.4)
CHLORIDE SERPL-SCNC: 101 MMOL/L (ref 99–111)
CHOLEST SERPL-MCNC: 123 MG/DL (ref 107–200)
CHOLEST/HDLC SERPL: 2 {RATIO} (ref 3–6)
CONV ABS IMM GRAN: 0.01 10*3/UL (ref 0–0.2)
CONV CO2: 24 MMOL/L (ref 22–32)
CONV CREATININE URINE, RANDOM: 57.1 MG/DL (ref 10–300)
CONV IMMATURE GRAN: 0.2 % (ref 0–1.8)
CONV MICROALBUM.,U,RANDOM: 12.1 MG/L (ref 0–20)
CONV TOTAL PROTEIN: 7.6 G/DL (ref 6.3–8.2)
CREAT UR-MCNC: 0.84 MG/DL (ref 0.5–0.9)
DEPRECATED RDW RBC AUTO: 42.2 FL (ref 36.4–46.3)
EOSINOPHIL # BLD AUTO: 0.15 10*3/UL (ref 0–0.7)
EOSINOPHIL # BLD AUTO: 2.6 % (ref 0–7)
ERYTHROCYTE [DISTWIDTH] IN BLOOD BY AUTOMATED COUNT: 13.9 % (ref 11.7–14.4)
EST. AVERAGE GLUCOSE BLD GHB EST-MCNC: 157 MG/DL
GFR SERPLBLD BASED ON 1.73 SQ M-ARVRAT: >60 ML/MIN/{1.73_M2}
GLOBULIN UR ELPH-MCNC: 2.8 G/DL (ref 2–3.5)
GLUCOSE SERPL-MCNC: 160 MG/DL (ref 65–99)
HBA1C MFR BLD: 7.1 % (ref 3.5–5.7)
HCT VFR BLD AUTO: 45.4 % (ref 37–47)
HDLC SERPL-MCNC: 62 MG/DL (ref 40–60)
HGB BLD-MCNC: 14.4 G/DL (ref 12–16)
LDLC SERPL CALC-MCNC: 44 MG/DL (ref 70–100)
LYMPHOCYTES # BLD AUTO: 2.28 10*3/UL (ref 1–5)
LYMPHOCYTES NFR BLD AUTO: 39.9 % (ref 20–45)
MCH RBC QN AUTO: 26.6 PG (ref 27–31)
MCHC RBC AUTO-ENTMCNC: 31.7 G/DL (ref 33–37)
MCV RBC AUTO: 83.8 FL (ref 81–99)
MICROALBUMIN/CREAT UR: 21.2 MG/G{CRE} (ref 0–35)
MONOCYTES # BLD AUTO: 0.37 10*3/UL (ref 0.2–1.2)
MONOCYTES NFR BLD AUTO: 6.5 % (ref 3–10)
NEUTROPHILS # BLD AUTO: 2.84 10*3/UL (ref 2–8)
NEUTROPHILS NFR BLD AUTO: 49.6 % (ref 30–85)
NRBC CBCN: 0 % (ref 0–0.7)
OSMOLALITY SERPL CALC.SUM OF ELEC: 292 MOSM/KG (ref 273–304)
PLATELET # BLD AUTO: 235 10*3/UL (ref 130–400)
PMV BLD AUTO: 12.1 FL (ref 9.4–12.3)
POTASSIUM SERPL-SCNC: 4.6 MMOL/L (ref 3.5–5.3)
RBC # BLD AUTO: 5.42 10*6/UL (ref 4.2–5.4)
SODIUM SERPL-SCNC: 139 MMOL/L (ref 135–147)
TRIGL SERPL-MCNC: 83 MG/DL (ref 40–150)
VLDLC SERPL-MCNC: 17 MG/DL (ref 5–37)
WBC # BLD AUTO: 5.72 10*3/UL (ref 4.8–10.8)

## 2021-04-12 ENCOUNTER — HOSPITAL ENCOUNTER (OUTPATIENT)
Dept: CARDIOLOGY | Facility: HOSPITAL | Age: 61
Discharge: HOME | End: 2021-04-12
Attending: INTERNAL MEDICINE

## 2021-04-14 ENCOUNTER — OFFICE VISIT CONVERTED (OUTPATIENT)
Dept: CARDIOLOGY | Facility: CLINIC | Age: 61
End: 2021-04-14
Attending: INTERNAL MEDICINE

## 2021-04-16 ENCOUNTER — CONVERSION ENCOUNTER (OUTPATIENT)
Dept: CARDIOLOGY | Facility: CLINIC | Age: 61
End: 2021-04-16
Attending: INTERNAL MEDICINE

## 2021-05-03 ENCOUNTER — HOSPITAL ENCOUNTER (OUTPATIENT)
Dept: NUCLEAR MEDICINE | Facility: HOSPITAL | Age: 61
Discharge: HOME | End: 2021-05-03
Attending: INTERNAL MEDICINE

## 2021-05-11 ENCOUNTER — HOSPITAL ENCOUNTER (OUTPATIENT)
Dept: INFUSION THERAPY | Facility: HOSPITAL | Age: 61
Setting detail: HOSPITAL OUTPATIENT SURGERY
Discharge: HOME | End: 2021-05-11
Attending: INTERNAL MEDICINE

## 2021-05-11 NOTE — PROCEDURES
"   Procedure Note      Patient Name: Silvia Massey   Patient ID: [id number]   Sex: Female   YOB: 1960    Primary Care Provider: Louie Jang MD   Referring Provider: Louie Jang MD    Visit Date: April 16, 2021    Provider: Arnulfo Santana MD   Location: [facility]   Location Address: [address] [place] [address], [address]   [place], [place]  [id number]   Location Phone: [phone]          FINAL REPORT   TRANSTHORACIC ECHOCARDIOGRAM REPORT    Diagnosis: Chest pain, congestive heart failure   Height: 5'3\" Weight: 202 B/P: 118/74 BSA: 1.94   Tech: JLW   MEASUREMENTS:  RVID (Diastole) : RVID. (NORMAL: 0.7 to 2.4 cm max)   LVID (Systole): 2.9 cm (Diastole): 3.9 cm. (NORMAL: 3.7 - 5.4 cm)   Posterior Wall Thickness (Diastole): 1.1 cm. (NORMAL: 0.8 - 1.1 cm)   Septal Thickness (Diastole): 1.1 cm. (NORMAL: 0.7 - 1.2 cm)   LAID (Systole): 3.2 cm. (NORMAL: 1.9 - 3.8 cm)   Aortic Root Diameter (Diastole): 3.2 cm. (NORMAL: 2.0 - 3.7 cm)   DOPPLER: Continuous-wave, pulse-wave, and color-flow examination of the mitral, aortic, and tricuspid valves was performed. No significant stenosis or regurgitation was identified. Doppler flow velocities were normal. E/A ratio is 0.6. DT= 166 msec. IVRT is 123 msec. E/E' is 14.   COMMENTS:  The patient underwent 2-D, M-Mode, and Doppler examination, including pulse-wave, continuous-wave, and color-flow analysis; the study is technically difficult to acquire.   FINDINGS:  AORTIC VALVE: Not able to visualize all three leaflets.   MITRAL VALVE: Grossly normal.   TRICUSPID VALVE: Not well seen.   PULMONIC VALVE: Not well seen.   LEFT ATRIUM: Appeared to be normal. No intracavity masses or clots seen. LA volume index is 30 mL/m2.   AORTIC ROOT: Appeared to be normal in size.   LEFT VENTRICLE: Mildly decreased EF of 40% with dyssynchronous contraction pattern and mild underlying left ventricular hypertrophy.   RIGHT ATRIUM: Appeared to be normal; no obvious evidence of " intracavity masses or clots.   RIGHT VENTRICLE: Normal size and function.   PERICARDIUM: Unremarkable. No evidence of effusion.   INFERIOR VENA CAVA: Diameter is 1.2 cm.   Fax: 04/19/2021      CONCLUSION:  1.  Technically limited study.  2.  Mild decreased ejection fraction of 40% with global hypokinesis and mild dyssynchronous contraction        pattern.  3.  Mild left ventricular hypertrophy.      Arnulfo Santana MD  /pap                 Electronically Signed by: Silvia Wayne-, Other -Author on April 19, 2021 03:28:19 PM  Electronically Co-signed by: Arnulfo Santana MD -Reviewer on April 26, 2021 09:41:13 AM

## 2021-05-12 NOTE — PROGRESS NOTES
Progress Note      Patient Name: Silvia Massey   Patient ID: [id number]   Sex: Female   YOB: 1960    Primary Care Provider: Louie Jang MD   Referring Provider: Louie Jang MD    Visit Date: April 24, 2020    Provider: Louie Jang MD   Location: [facility]   Location Address: [address] [place] [address]  [id number]   Location Phone: [phone]          Chief Complaint  · Per patient itching for about a month      History Of Present Illness  Video Conferencing Visit  Silvia Massey is a 60 year old /Black female who is presenting for evaluation via video conferencing with Zoom. Verbal consent obtained before beginning visit.   The following staff were present during this visit: Dr. Jang and pt   Silvia Massey is a 60 year old /Black female who presents for evaluation and treatment of:      pt has had a diffuse, itchy rash for a couple months. rash started approx. 3 weeks after starting trulicity. triamcinolone cream has not helped. pt has not been outside the house. pt does not think it is any bug bites. pt denies fevers and pain       Past Medical History  Disease Name Date Onset Notes   Anemia --  --    Arthritis --  --    CHF (congestive heart failure) --  --    Depression --  --    Diabetes mellitus, type 2 --  --    Forgetfulness --  --    Gastroesophageal reflux disease --  --    GERD (gastroesophageal reflux disease) --  --    Hypertension --  --    Night sweats --  --    Screening for breast cancer 6/2019 normal    Sinus trouble --  --    Sleep apnea --  --    Voice hoarseness --  --          Past Surgical History  Procedure Name Date Notes   Blephroplasty 07/09/19 --    Colonoscopy 2018 wnl   EGD 2017 --    Lypoma Removal --  --    Removal of ovary --  --          Medication List  Name Date Started Instructions   albuterol sulfate 2.5 mg /3 mL (0.083 %) inhalation solution for nebulization   2.5 mg INH RTQID   Aldactone 25 mg oral tablet 05/17/2019 take 1 tablet (25 mg) by oral route once daily   Aspir-81 81 mg oral tablet,delayed release (DR/EC) 05/17/2019 take 1 tablet (81 mg) by oral route once daily for 90 days   carvedilol 3.125 mg oral tablet 07/15/2019 TAKE 1 TABLET BY MOUTH TWICE DAILY   Crestor 10 mg oral tablet 05/17/2019 take 1 tablet (10 mg) by oral route once daily at bedtime for 90 days   Dulera 200-5 mcg/actuation inhalation HFA aerosol inhaler  inhale 2 puffs INH RTBID   Entresto 24-26 mg oral tablet 07/05/2019 TAKE 1 TABLET BY MOUTH TWICE DAILY   Farxiga 10 mg oral tablet 04/15/2020 take 1 tablet (10 mg) by oral route once daily in the morning   gabapentin 600 mg oral tablet 01/09/2019 take 1 tablet by oral route 3 times a day prn pain   ProAir HFA 90 mcg/actuation inhalation HFA aerosol inhaler  inhale 2 puffs by inhalation route for shortness of breath   triamcinolone acetonide 0.1 % topical cream 04/06/2020 apply a thin layer to the affected area(s) by topical route 2 times per day   Trulicity 1.5 mg/0.5 mL subcutaneous pen injector 04/15/2020 inject 0.5 milliliter (1.5 mg) by subcutaneous route once weekly in the abdomen, thigh, or upper arm rotating injection sites         Allergy List  Allergen Name Date Reaction Notes   NO KNOWN DRUG ALLERGIES --  --  --    Trulicity Apr 6 2020 12:00AM possible rash --          Family Medical History  Disease Name Relative/Age Notes   Heart Disease Father/   --    Cancer, Unspecified Mother/   --    Diabetes mellitus, type II Aunt/  Brother/  Uncle/   --    Uterine Cancer, Family History  --    Family history of cancer Mother/   Mother   Family history of heart disease Brother/   Brother   Family history of diabetes mellitus Brother/   Brother         Social History  Finding Status Start/Stop Quantity Notes   Alcohol Current some day --/-- --  occasionally drinks, 1 drink per day, has been drinking for 6-10 years  1 beer a night    --   --/-- --  --    Tobacco Former --/-- --  former smoker  1/2 ppd   Working --  --/-- --  --          Immunizations  NameDate Admin Mfg Trade Name Lot Number Route Inj VIS Given VIS Publication   Hepatitis A01/09/2019 NE Not Entered  NE NE 01/17/2019    Comments:    Icvckbtkd80/24/2019 [facility] Fluzone Quadrivalent [id number] IM RD 10/24/2019    Comments: patient tolerated well, left office in stable condition   Xopogkvzb72/31/2018 [employer] Fluzone Quadrivalent [id number] IM LD 10/31/2018 08/19/2014   Comments: Pt tolerated well, left office in stable condition.   Rauhyfvxw8784/31/2018 [employer] PNEUMOVAX 23 [id number] IM RD 10/31/2018 04/24/2015   Comments: Pt tolerated well, left office in stable condition.   Hhkoqrqy72/24/2019 [employer] Other TradeName [id number] IM LD 10/24/2019    Comments: patient tolerated well, left office in stable condition         Review of Systems  · Constitutional  o Denies  o : fever, fatigue, weight loss, weight gain  · Cardiovascular  o Denies  o : lower extremity edema, chest pressure, palpitations  · Respiratory  o Denies  o : shortness of breath, wheezing, cough, dyspnea on exertion  · Gastrointestinal  o Denies  o : nausea, vomiting, diarrhea, constipation, abdominal pain  · Integument  o Admits  o : rash, itching, skin dryness      Physical Examination     Gen: well-nourished, no acute distress  HENT: atraumatic, normocephalic  Eyes: extraocular movements intact, no scleral icterus  Lung: breathing comfortably, no cough  Neuro: grossly oriented to person, place, and time. no facial droop   Psych: normal mood and affect  skin: multiple popular, hyperpigmented lesions on upper chest, back, and BUE.           Assessment  · Allergic dermatitis     692.9/L23.9  will Rx prednisone as steroid ointment has not worked. discussed use of vitamin E ointment to help with hyperpigmentation. call or RTC if no better or other concerns. pt instructed to monitor BG and BPs closely. stop trulicty as possible culprit. restart  victoza. consider ozempic or bydureon in the future.     Problems Reconciled  Plan  · Orders  o ACO-39: Current medications updated and reviewed () - - 04/24/2020  · Medications  o prednisone 20 mg oral tablet   SIG: take 2 tablets (40 mg) by oral route once daily for 5 days   DISP: (10) tablets with 0 refills  Adjusted on 04/24/2020     o Farxiga 10 mg oral tablet   SIG: take 1 tablet (10 mg) by oral route once daily in the morning for 90 days   DISP: (90) tablets with 3 refills  Refilled on 04/24/2020     o Medications have been Reconciled  o Transition of Care or Provider Policy  · Instructions  o Patient was educated/instructed on their diagnosis, treatment and medications prior to discharge from the clinic today.  · Disposition  o Call or Return if symptoms worsen or persist.            Electronically Signed by: Louie Jang MD -Author on April 24, 2020 08:33:25 AM

## 2021-05-12 NOTE — PROGRESS NOTES
"   Progress Note      Patient Name: Silvia Massey   Patient ID: [id number]   Sex: Female   YOB: 1960    Primary Care Provider: Louie Jang MD   Referring Provider: Renetta Larson MD    Visit Date: April 6, 2020    Provider: Cecy Nassar PA-C   Location: [facility]   Location Address: [address] [place] [address], [address]  [id number]   Location Phone: [phone]          Chief Complaint  · rash      History Of Present Illness  Video Conferencing Visit  Silvia Massey is a 59 year old /Black female who is presenting for evaluation via video conferencing through Zoom. Verbal consent obtained before beginning visit.   The following staff were present during this visit: Cecy Nassar PA-C   Silvia Massey is a 59 year old /Black female who presents for evaluation and treatment of:      possible allergic reaction. Patient states that since starting jardiance and trulicity 3 weeks ago she has noticed \"bumps\" on hand, and has spread up her arm.  She noticed some itching of her eye last night and new spots popped up there this morning.  Patient states that rash typical starts as raised, swollen and red areas then develops into smaller \"bumps\".  She has been taking hydrocortisone and Benadryl which helped some but she is still having some itching. She has been on jardiance in the past but not trulicity. No new soaps, lotions or detergents.  No new foods.      am sugars 112, no low episodes.       Past Medical History  Disease Name Date Onset Notes   Anemia --  --    Arthritis --  --    CHF (congestive heart failure) --  --    Depression --  --    Diabetes mellitus, type 2 --  --    Forgetfulness --  --    Gastroesophageal reflux disease --  --    GERD (gastroesophageal reflux disease) --  --    Hypertension --  --    Night sweats --  --    Screening for breast cancer 6/2019 normal    Sinus trouble --  --    Sleep apnea --  --  "   Voice hoarseness --  --          Past Surgical History  Procedure Name Date Notes   Blephroplasty 07/09/19 --    Colonoscopy 2018 wnl   EGD 2017 --    Lypoma Removal --  --    Removal of ovary --  --          Medication List  Name Date Started Instructions   albuterol sulfate 2.5 mg /3 mL (0.083 %) inhalation solution for nebulization  2.5 mg INH RTQID   Aldactone 25 mg oral tablet 05/17/2019 take 1 tablet (25 mg) by oral route once daily   Aspir-81 81 mg oral tablet,delayed release (DR/EC) 05/17/2019 take 1 tablet (81 mg) by oral route once daily for 90 days   carvedilol 3.125 mg oral tablet 07/15/2019 TAKE 1 TABLET BY MOUTH TWICE DAILY   Crestor 10 mg oral tablet 05/17/2019 take 1 tablet (10 mg) by oral route once daily at bedtime for 90 days   Dulera 200-5 mcg/actuation inhalation HFA aerosol inhaler  inhale 2 puffs INH RTBID   Entresto 24-26 mg oral tablet 07/05/2019 TAKE 1 TABLET BY MOUTH TWICE DAILY   gabapentin 600 mg oral tablet 01/09/2019 take 1 tablet by oral route 3 times a day prn pain   Jardiance 25 mg oral tablet 03/11/2020 take 1 tablet (25 mg) by oral route once daily in the morning for 30 days   ProAir HFA 90 mcg/actuation inhalation HFA aerosol inhaler  inhale 2 puffs by inhalation route for shortness of breath         Allergy List  Allergen Name Date Reaction Notes   NO KNOWN DRUG ALLERGIES --  --  --    Trulicity Apr 6 2020 12:00AM possible rash --        Allergies Reconciled  Family Medical History  Disease Name Relative/Age Notes   Heart Disease Father/   --    Cancer, Unspecified Mother/   --    Diabetes mellitus, type II Aunt/  Brother/  Uncle/   --    Uterine Cancer, Family History  --    Family history of cancer Mother/   Mother   Family history of heart disease Brother/   Brother   Family history of diabetes mellitus Brother/   Brother         Social History  Finding Status Start/Stop Quantity Notes   Alcohol Current some day --/-- --  occasionally drinks, 1 drink per day, has been  drinking for 6-10 years  1 beer a night    --  --/-- --  --    Tobacco Former --/-- --  former smoker  1/2 ppd   Working --  --/-- --  --          Immunizations  NameDate Admin Mfg Trade Name Lot Number Route Inj VIS Given VIS Publication   Hepatitis A01/09/2019 NE Not Entered  NE NE 01/17/2019    Comments:    Aypjttygy36/24/2019 [facility] Fluzone Quadrivalent [id number] IM RD 10/24/2019    Comments: patient tolerated well, left office in stable condition   Qpdrwlstz34/31/2018 [employer] Fluzone Quadrivalent [id number] IM LD 10/31/2018 08/19/2014   Comments: Pt tolerated well, left office in stable condition.   Zcelqcijn1267/31/2018 [employer] PNEUMOVAX 23 [id number] IM RD 10/31/2018 04/24/2015   Comments: Pt tolerated well, left office in stable condition.   Fqgsgvju83/24/2019 [facility] Other TradeName [id number] IM LD 10/24/2019    Comments: patient tolerated well, left office in stable condition         Review of Systems  · Constitutional  o Denies  o : fever, fatigue, weight loss, weight gain  · Cardiovascular  o Denies  o : lower extremity edema, claudication, chest pressure, palpitations  · Respiratory  o Denies  o : shortness of breath, wheezing, cough, hemoptysis, dyspnea on exertion  · Gastrointestinal  o Denies  o : nausea, vomiting, diarrhea, constipation, abdominal pain  · Integument  o Admits  o : rash      Physical Examination     Gen: well-nourished, no acute distress  HENT: atraumatic, normocephalic  Eyes: extraocular movements intact, no scleral icterus  Lung: breathing comfortably, no cough  Skin: slightly erythematous maculopapules scattered on hands and arms with a couple lesions under R eye  Neuro: grossly oriented to person, place, and time. no facial droop   Psych: normal mood and affect                 Assessment  · Diabetes mellitus, type 2     250.00/E11.9  · Rash and nonspecific skin eruption     782.1/R21  Could possibly be reaction to Trulicity due to starting new medication and timing of appearance of rash. Will d/c  trulicity and have patient start back on Victoza. Continue Jardiance. Will send in triamcinolone cream for itching. If symptoms persist or worsen patient needs to be seen in the office for further evaluation. Continue to monitor sugars. Will check A1c in 3 months due to changing to victoza.       Plan  · Orders  o Hgb A1c [facility] ([id number]) - 250.00/E11.9 - 07/06/2020  o ACO-39: Current medications updated and reviewed () - - 04/06/2020  · Medications  o triamcinolone acetonide 0.1 % topical cream   SIG: apply a thin layer to the affected area(s) by topical route 2 times per day   DISP: (1) 15 gm tube with 0 refills  Prescribed on 04/06/2020     o Victoza 2-Henrry 0.6 mg/0.1 mL (18 mg/3 mL) subcutaneous pen injector   SIG: inject 1.2 mg by subcutaneous route once daily   DISP: (1) 3 ml syringe with 0 refills  Prescribed on 04/06/2020     o Trulicity 1.5 mg/0.5 mL subcutaneous pen injector   SIG: inject 0.5 milliliter (1.5 mg) by subcutaneous route once weekly in the abdomen, thigh, or upper arm rotating injection sites   DISP: (4) 0.5 ml syringe with 2 refills  Discontinued on 04/06/2020     o Medications have been Reconciled  o Transition of Care or Provider Policy  · Instructions  o Continue blood sugar monitoring daily and record. Bring your log to office visits. Call the office for readings below 70 and above 250 or any complications.  o Discussed with patient blood pressure monitoring, hemoglobin A1C levels need to be below 7.0, and LDL (Lipid) goals below 70.  o Take all medications as prescribed/directed.  o Patient was educated/instructed on their diagnosis, treatment and medications prior to discharge from the clinic today.  o Patient instructed to seek medical attention urgently for new or worsening symptoms.  o Call the office with any concerns or questions.  · Disposition  o Call or Return if symptoms worsen or persist.  o follow up as needed  o Medications sent electronically to  pharmacy            Electronically Signed by: Cecy Nassar PA-C -Author on April 6, 2020 12:39:52 PM  Electronically Co-signed by: Renetta Larson MD -Reviewer on April 6, 2020 03:52:54 PM

## 2021-05-13 NOTE — PROGRESS NOTES
Progress Note      Patient Name: Silvia Massey   Patient ID: [id number]   Sex: Female   YOB: 1960    Primary Care Provider: Louie Jang MD   Referring Provider: Louie Jang MD    Visit Date: September 24, 2020    Provider: Arnulfo Santana MD   Location: [facility]   Location Address: [address] [place] [address], [facility]   [place], [place]  [id number]   Location Phone: [phone]          Chief Complaint     Congestive heart failure.       History Of Present Illness  REFERRING CARE PROVIDER: Louie Jang MD   Silvia Massey is a 60 year old /Black female with known systolic congestive heart failure with reduced ejection fraction of 50%, hypertension, dyslipidemia, and nonocclusive CAD. No new complaints or problems.   PAST MEDICAL HISTORY: Anemia; Arthritis; Congestive heart failure; Coronary artery disease, Nonocclusive; Depression; Diabetes mellitus, type 2; GERD; Hypertension; Sleep apnea.   FAMILY HISTORY: Positive for diabetes mellitus and hypertension. Negative for heart disease.   PSYCHOSOCIAL HISTORY: Previously smoked, but quit. Moderate alcohol consumption.   CURRENT MEDICATIONS: Carvedilol 3.125 mg b.i.d.; Farxiga 10 mg daily; Entresto 49-51 mg b.i.d.; spironolactone 25 mg daily; rosuvastatin 10 mg daily; aspirin 81 mg daily; gabapentin 600 mg p.r.n.; baclofen 10 mg p.r.n.; glipizide 10 mg b.i.d.; zinc 22 mg daily; vitamin D 50,000 units weekly; Omega-3 1000 mg b.i.d.; Victoza daily.       Review of Systems  · Cardiovascular  o Denies  o : palpitations (fast, fluttering, or skipping beats), swelling (feet, ankles, hands), shortness of breath while walking or lying flat, chest pain or angina pectoris   · Respiratory  o Denies  o : chronic or frequent cough, asthma or wheezing      Vitals  Date Time BP Position Site L\R Cuff Size HR RR TEMP (F) WT  HT  BMI kg/m2 BSA m2 O2 Sat FR L/min FiO2 HC       09/24/2020 03:01 /82 Sitting    94 - R   199lbs 0oz  "5'  3.5\" 34.7 2.01             Physical Examination  · Constitutional  o Appearance  o : Awake, alert, in no acute distress.   · Eyes  o Conjunctivae  o : Normal.  · Ears, Nose, Mouth and Throat  o Oral Cavity  o :   § Oral Mucosa  § : Normal.  · Neck  o Inspection/Palpation  o : No JVD. Good carotid upstroke. No thyromegaly.  · Respiratory  o Respiratory  o : Good respiratory effort. Clear to percussion and auscultation.  · Cardiovascular  o Heart  o :   § Auscultation of Heart  § : S1, S2 normal. Regular rate and rhythm without murmurs, gallops, or rubs.  o Peripheral Vascular System  o :   § Extremities  § : Good femoral and pedal pulses. No pedal edema.  · Gastrointestinal  o Abdominal Examination  o : Soft. No tenderness or masses felt. No hepatosplenomegaly. Abdominal aorta is not palpable.  · Labs  o Labs  o : Creatinine 0.8. LDL cholesterol 35. HDL 64.          Assessment     ASSESSMENT & PLAN:    1.  Nonischemic cardiomyopathy with near-normal ejection fraction on recent echocardiogram, stable        symptoms.  Continued to encourage exercise and weight loss.  Continue with current meds.  2.  Coronary artery disease, nonocclusive.  Continue aspirin.  3.  Hyperlipidemia.  Patient is on statin therapy.  LDL is below 70.        Arnulfo Santana MD  JH:vm             Electronically Signed by: Hanna Gifford-, Other -Author on September 30, 2020 08:12:55 AM  Electronically Co-signed by: Arnulfo Santana MD -Reviewer on October 4, 2020 10:45:42 AM  "

## 2021-05-13 NOTE — PROGRESS NOTES
"   Progress Note      Patient Name: Silvia Massey   Patient ID: [id number]   Sex: Female   YOB: 1960    Primary Care Provider: Louie Jang MD   Referring Provider: Louie Jang MD    Visit Date: September 11, 2020    Provider: Louie Jang MD   Location: [facility]   Location Address: [address] [place] [address], [address]  [place], [place]  [id number]   Location Phone: [phone]          Chief Complaint  · \" I have a lump on left shoulder that I want him to look at\"  · \"I am having pain in my back that radiates down to my foot\"  · \"I need refill on diflucan\"      History Of Present Illness  Silvia Massey is a 60 year old /Black female who presents for evaluation and treatment of:      pt reports her back has been hurting as she walks more often. pt has been using gabapentin to help with pain. pt also using baclofen to help. pt report back pain is slowing her down. pt does not want to consider surgery. pt has previously had steroid back injections without much relief. pt has utilized TENS unit without much help.    HTN- pt denies HAs, dizziness, CP. pt reports elevated today because of back pain.   DM2- pt has gained 6 lbs over course of pandemic. pt cont on metformin, farxiga, and victoza. pt reports BG have been avg 200.    HLD- doing well on statin  CHF- pt denies SOB, JONES, CP.       Past Medical History  Disease Name Date Onset Notes   Anemia --  --    Arthritis --  --    CHF (congestive heart failure) --  --    Depression --  --    Diabetes mellitus, type 2 --  --    Forgetfulness --  --    Gastroesophageal reflux disease --  --    GERD (gastroesophageal reflux disease) --  --    Hypertension --  --    Night sweats --  --    Screening for breast cancer 6/2019 normal    Sinus trouble --  --    Sleep apnea --  --    Voice hoarseness --  --          Past Surgical History  Procedure Name Date Notes   Blephroplasty 07/09/19 --    Colonoscopy 2018 wnl "   EGD 2017 --    Lypoma Removal --  --    Removal of ovary --  --          Medication List  Name Date Started Instructions   albuterol sulfate 2.5 mg /3 mL (0.083 %) inhalation solution for nebulization  2.5 mg INH RTQID   aspirin 81 mg oral tablet,delayed release (/EC) 08/20/2020 Take 1 tablet by mouth once daily   baclofen 10 mg oral tablet 08/28/2020 take 1 tablet by oral route 3 times a day prn pain   carvedilol 3.125 mg oral tablet 08/19/2020 Take 1 tablet by mouth twice daily   Diflucan 200 mg oral tablet 09/11/2020 take 1 tablet (200 mg) by oral route once. repeat one dose three days later. prn yeast infections   Dulera 200-5 mcg/actuation inhalation HFA aerosol inhaler  inhale 2 puffs INH RTBID   Entresto 49-51 mg oral tablet 05/18/2020 TAKE 1 TABLET BY MOUTH TWICE DAILY   Farxiga 10 mg oral tablet 04/24/2020 take 1 tablet (10 mg) by oral route once daily in the morning for 90 days   gabapentin 600 mg oral tablet 01/09/2019 take 1 tablet by oral route 3 times a day prn pain   ProAir HFA 90 mcg/actuation inhalation HFA aerosol inhaler  inhale 2 puffs by inhalation route for shortness of breath   rosuvastatin 10 mg oral tablet 06/22/2020 TAKE 1 TABLET BY MOUTH ONCE DAILY AT BEDTIME FOR 90 DAYS   spironolactone 25 mg oral tablet 06/22/2020 TAKE 1 TABLET BY MOUTH ONCE DAILY   Victoza 2-Henrry 0.6 mg/0.1 mL (18 mg/3 mL) subcutaneous pen injector 08/31/2020 INJECT 1.2 MG SUBCUTANEOUSLY ONCE DAILY         Allergy List  Allergen Name Date Reaction Notes   NO KNOWN DRUG ALLERGIES --  --  --          Family Medical History  Disease Name Relative/Age Notes   Heart Disease Father/   --    Cancer, Unspecified Mother/   --    Diabetes Mellitus, Type II Aunt/  Brother/  Uncle/   --    Uterine Cancer, Family History  --    Family history of cancer Mother/   Mother   Family history of heart disease Brother/   Brother   Family history of diabetes mellitus Brother/   Brother         Social History  Finding Status Start/Stop  "Quantity Notes   Alcohol Current some day --/-- --  occasionally drinks, 1 drink per day, has been drinking for 6-10 years  1 beer a night    --  --/-- --  --    Tobacco Former --/-- --  former smoker  1/2 ppd   Working --  --/-- --  --          Immunizations  NameDate Admin Mfg Trade Name Lot Number Route Inj VIS Given VIS Publication   Hepatitis A01/09/2019 NE Not Entered  NE NE 01/17/2019    Comments:    Bwkujqnvt59/11/2020 [facility] Fluzone Quadrivalent [id number] IM LD 09/11/2020    Comments: pt tolerated well, left office in stable condition   Qmczygmxv75/24/2019 [facility] Fluzone Quadrivalent [id number] IM RD 10/24/2019    Comments: patient tolerated well, left office in stable condition   Mosvpfgoc46/31/2018 [facility] Fluzone Quadrivalent [id number] IM LD 10/31/2018 08/19/2014   Comments: Pt tolerated well, left office in stable condition.   Dumwwpdeq1724/31/2018 MSD PNEUMOVAX 23 [id number] IM RD 10/31/2018 04/24/2015   Comments: Pt tolerated well, left office in stable condition.   Rrpigeah73/24/2019 [facility] Other TradeName [id number] IM LD 10/24/2019    Comments: patient tolerated well, left office in stable condition         Review of Systems  · Constitutional  o Denies  o : fever, fatigue, weight loss, weight gain  · HENT  o Denies  o : headaches, lightheadedness  · Cardiovascular  o Denies  o : lower extremity edema, chest pressure, palpitations  · Respiratory  o Denies  o : shortness of breath, wheezing, cough, dyspnea on exertion  · Gastrointestinal  o Denies  o : nausea, vomiting, diarrhea, constipation, abdominal pain  · Musculoskeletal  o Admits  o : back pain  o Denies  o : muscular weakness      Vitals  Date Time BP Position Site L\R Cuff Size HR RR TEMP (F) WT  HT  BMI kg/m2 BSA m2 O2 Sat        03/04/2020 01:28 /72 Sitting    96 - R   193lbs 16oz 5'  3.5\" 33.83 1.99     03/10/2020 10:57 /88 Sitting    80 - R  97.6 193lbs 16oz 5'  3.5\" 33.83 1.99 97 %    09/11/2020 03:18 /82 Sitting    93 - R 16 97.7 " "200lbs 0oz 5'  3.5\" 34.87 2.02 96 %          Physical Examination  · Constitutional  o Appearance  o : no acute distress, well-nourished  · Head and Face  o Head  o :   § Inspection  § : atraumatic, normocephalic  · Eyes  o Eyes  o : extraocular movements intact, no scleral icterus, no conjunctival injection  · Ears, Nose, Mouth and Throat  o Ears  o :   § External Ears  § : normal  o Nose  o :   § Intranasal Exam  § : nares patent  o Oral Cavity  o :   § Oral Mucosa  § : moist mucous membranes  · Respiratory  o Respiratory Effort  o : breathing comfortably, symmetric chest rise  o Auscultation of Lungs  o : clear to asculatation bilaterally, no wheezes, rales, or rhonchii  · Cardiovascular  o Heart  o :   § Auscultation of Heart  § : regular rate and rhythm, no murmurs, rubs, or gallops  o Peripheral Vascular System  o :   § Extremities  § : no edema  · Skin and Subcutaneous Tissue  o General Inspection  o : palpable, rubbery mass overlying left shoulder approx. [id number].   · Neurologic  o Mental Status Examination  o :   § Orientation  § : grossly oriented to person, place and time  o Gait and Station  o :   § Gait Screening  § : normal gait  · Psychiatric  o General  o : normal mood and affect          Results  · In-Office Procedures  o Lab procedure  § IOP - Urine Drug Screen [facility] (00459)   § Amphetamines Ur Ql: Negative   § Barbiturates Ur Ql: Negative   § Buprenorphine+Nor Ur Ql Scn: Negative   § Benzodiaz Ur Ql: Negative   § Cocaine Ur Ql: Negative   § Methadone Ur Ql: Negative   § Methamphet Ur Ql: Negative   § MDMA Ur Ql Scn: Negative   § Opiates Ur Ql: Negative   § Oxycodone Ur Ql: Negative   § PCP Ur Ql: Negative   § THC Ur Ql: Negative   § Temp in Range?: Within/Acceptable   § Control Seen?: No       Assessment  · CHF (congestive heart failure)     428.0/I50.9  cont entresto, farxiga, aldactone, and coreg. euvolemic today.   · Diabetes mellitus, type 2     250.00/E11.9  uncontrolled based on home " readings. discussed need for insulin if HgbA1c >1 point above target. cont current regimen. intolerant to metformin previously.   · Hypertension     401.9/I10  elevated today. cont current regimen for now. cont monitoring.   · Need for influenza vaccination     V04.81/Z23  · Visit for screening mammogram     V76.12/Z12.31  · Lumbago     724.2/M54.5  refer to pain management to consider RFA.   · Vitamin D deficiency     268.9/E55.9  check level  · Yeast infection     112.9/B37.9  will Rx diflucan and monitor for effectivenss.   · Lipoma     214.9/D17.9  discussed diagnosis, prognosis, and possible treatment options. pt defers removal at this time.     Problems Reconciled  Plan  · Orders  o Diabetes 1 Panel (CMP, Lipid, A1c) [facility] (51276, 26501, 86080) - 250.00/E11.9 - 09/11/2020  o CBC with Auto Diff [facility] (77858) - 250.00/E11.9 - 09/11/2020  o Diabetic Dilated Eye Exam Consult with Ophthalmology/Optometry (DMEYE) - 250.00/E11.9 - 09/11/2020  o ACO-41: Dilated Diabetic eye exam completed this year and results in chart/reviewed (2022F) - 250.00/E11.9 - 09/11/2020  o Screening Mammography; Bilateral 2D (75964, ) - V76.12/Z12.31 - 09/11/2020  o Immunization Admin Fee (Single) ([facility]) (13157) - - 09/11/2020  o Vitamin D (25-Hydroxy) Level (83100) - 268.9/E55.9 - 09/11/2020  o ACO-39: Current medications updated and reviewed () - - 09/11/2020  o ACO-14: Influenza immunization administered or previously received () - - 09/13/2020  o ACO-20: Screening Mammography documented and reviewed () - - 09/11/2020  o ACO-19: Colorectal cancer screening results documented and reviewed (3017F) - - 09/11/2020  o PAIN MANAGEMENT CONSULTATION (PAINM) - 724.2/M54.5 - 09/11/2020  o Fluzone Quadrivalent Vaccine, age 6 months + (83821) - - 09/11/2020   Vaccine - Influenza; Dose: 0.5; Site: Left Deltoid; Route: Intramuscular; Date: 09/11/2020 16:59:00; Exp: 06/30/2020; Lot: [id number]; Mfg: [employer] pasteur; TradeName: Fluzone  Quadrivalent; Administered By: Keyla Murphy; Comment: pt tolerated well, left office in stable condition  · Medications  o Diflucan 200 mg oral tablet   SIG: take 1 tablet (200 mg) by oral route once. repeat one dose three days later. prn yeast infections   DISP: (30) tablets with 3 refills  Adjusted on 09/11/2020     o Medications have been Reconciled  o Transition of Care or Provider Policy  · Instructions  o Patient was educated/instructed on their diagnosis, treatment and medications prior to discharge from the clinic today.  o 40 Minutes spent with patient including greater than 50% in Education/Counseling/Care Coordination.  · Disposition  o f/u in 3 months  o labs done in clinic  o Care Transition  o AURE Sent            Electronically Signed by: Louie Jang MD -Author on September 13, 2020 08:48:28 PM

## 2021-05-13 NOTE — PROGRESS NOTES
"   Progress Note      Patient Name: Silvia Massey   Patient ID: [id number]   Sex: Female   YOB: 1960    Primary Care Provider: Louie Jang MD   Referring Provider: Louie Jang MD    Visit Date: December 8, 2020    Provider: Clari Ho MD   Location: [facility]   Location Address: [address] [place] [address]  [id number]   Location Phone: [phone]          Chief Complaint  · \"My lungs kind of hurt and every now and then the breathing.\"      History Of Present Illness  Silvia Massey is a 60 year old /Black female who presents for evaluation and treatment of:      \"lung pain\":   Points to the lower portion of her rib cage, bilaterally, difficulty to characterize \"a pain\" ache, present for the past 2 wks. Denies cough, fevers or chills. Denies cough, rhinorrhea, congestion. Denies trauma or injury to her chest. Denies nausea or vomiting. No alleviating or worsening factors. Pain mostly present when she is palpating the area however present even without palpation when sitting at home.     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   She has a hx of NELY but does not wear her CPAP, not for the past few months due to waking up and feelin gas though she is choking with the mask on. She has a hx of CHF but denies any swelling, chest pain or SOA.     Hx of GERD, stable and not on treatment. Denies noticing any association with food or postion for the present pain over her lower ribs.       Past Medical History  Disease Name Date Onset Notes   Anemia --  --    Arthritis --  --    CHF (congestive heart failure) --  --    Depression --  --    Diabetes mellitus, type 2 --  --    Forgetfulness --  --    Gastroesophageal reflux disease --  --    GERD (gastroesophageal reflux disease) --  --    Hypertension --  --    Night sweats --  --    Screening for breast cancer 6/2019 normal  "   Sinus trouble --  --    Sleep apnea --  --    Voice hoarseness --  --          Past Surgical History  Procedure Name Date Notes   Blephroplasty 07/09/19 --    Colonoscopy 2018 wnl   EGD 2017 --    Lypoma Removal --  --    Removal of ovary --  --          Medication List  Name Date Started Instructions   albuterol sulfate 2.5 mg /3 mL (0.083 %) inhalation solution for nebulization  2.5 mg INH RTQID   aspirin 81 mg oral tablet,delayed release (DR/EC) 08/20/2020 Take 1 tablet by mouth once daily   baclofen 10 mg oral tablet 08/28/2020 take 1 tablet by oral route 3 times a day prn pain   carvedilol 3.125 mg oral tablet 08/19/2020 Take 1 tablet by mouth twice daily   Diflucan 200 mg oral tablet 09/11/2020 take 1 tablet (200 mg) by oral route once. repeat one dose three days later. prn yeast infections   Dulera 200-5 mcg/actuation inhalation HFA aerosol inhaler  inhale 2 puffs INH RTBID   Entresto 49-51 mg oral tablet 05/18/2020 TAKE 1 TABLET BY MOUTH TWICE DAILY   Farxiga 10 mg oral tablet 04/24/2020 take 1 tablet (10 mg) by oral route once daily in the morning for 90 days   gabapentin 600 mg oral tablet 01/09/2019 take 1 tablet by oral route 3 times a day prn pain   glipizide 10 mg oral tablet 09/14/2020 take 1 tablet (10 mg) by oral route once BID with meals   ProAir HFA 90 mcg/actuation inhalation HFA aerosol inhaler  inhale 2 puffs by inhalation route for shortness of breath   rosuvastatin 10 mg oral tablet 06/22/2020 TAKE 1 TABLET BY MOUTH ONCE DAILY AT BEDTIME FOR 90 DAYS   spironolactone 25 mg oral tablet 06/22/2020 TAKE 1 TABLET BY MOUTH ONCE DAILY   Victoza 2-Henrry 0.6 mg/0.1 mL (18 mg/3 mL) subcutaneous pen injector 08/31/2020 INJECT 1.2 MG SUBCUTANEOUSLY ONCE DAILY   Vitamin D2 1,250 mcg (50,000 unit) oral capsule 11/23/2020 take 1 capsule by oral route once weekly         Allergy List  Allergen Name Date Reaction Notes   NO KNOWN DRUG ALLERGIES --  --  --          Family Medical History  Disease Name  "Relative/Age Notes   Heart Disease Father/   --    Cancer, Unspecified Mother/   --    Diabetes Mellitus, Type II Aunt/  Brother/  Uncle/   --    Uterine Cancer, Family History  --    Family history of cancer Mother/   Mother   Family history of heart disease Brother/   Brother   Family history of diabetes mellitus Brother/   Brother         Social History  Finding Status Start/Stop Quantity Notes   Alcohol Current some day --/-- --  occasionally drinks, 1 drink per day, has been drinking for 6-10 years  1 beer a night    --  --/-- --  --    Tobacco Former --/-- --  former smoker  1/2 ppd   Working --  --/-- --  --          Immunizations  NameDate Admin Mfg Trade Name Lot Number Route Inj VIS Given VIS Publication   Hepatitis A01/09/2019 NE Not Entered  NE NE 01/17/2019    Comments:    Cwxtdbmat39/11/2020 [facility] Fluzone Quadrivalent [id number]  LD 09/11/2020    Comments: pt tolerated well, left office in stable condition   Inxltqagx7734/31/2018 [facility] PNEUMOVAX 23 [id number] IM RD 10/31/2018 04/24/2015   Comments: Pt tolerated well, left office in stable condition.   Srusuegf86/24/2019 [facility] Other TradeName [id number] [facility] 10/24/2019    Comments: patient tolerated well, left office in stable condition         Review of Systems  · Constitutional  o Denies  o : fever, fatigue, weight loss, weight gain  · Cardiovascular  o Denies  o : lower extremity edema, claudication, chest pressure, palpitations  · Respiratory  o Denies  o : shortness of breath, wheezing, frequent cough, hemoptysis, dyspnea on exertion  · Gastrointestinal  o Denies  o : nausea, vomiting, diarrhea, constipation, abdominal pain      Vitals  Date Time BP Position Site L\R Cuff Size HR RR TEMP (F) WT  HT  BMI kg/m2 BSA m2 O2 Sat FR L/min FiO2 HC       09/11/2020 03:18 /82 Sitting    93 - R 16 97.7 200lbs 0oz 5'  3.5\" 34.87 2.02 96 %  21%    09/24/2020 03:01 /82 Sitting    94 - R   199lbs 0oz 5'  3.5\" 34.7 2.01       12/08/2020 11:24 /72 " "Sitting    80 - R  96.2 203lbs 4oz 5'  3.5\" 35.44 2.03 99 %  21%          Physical Examination  · Constitutional  o Appearance  o : no acute distress, well-nourished  · Head and Face  o Head  o :   § Inspection  § : atraumatic, normocephalic  · Ears, Nose, Mouth and Throat  o Ears  o :   § External Ears  § : normal  · Respiratory  o Respiratory Effort  o : breathing comfortably, symmetric chest rise  o Auscultation of Lungs  o : clear to asculatation bilaterally, no wheezes, rales, or rhonchii  · Cardiovascular  o Heart  o :   § Auscultation of Heart  § : regular rate and rhythm, no murmurs, rubs, or gallops  o Peripheral Vascular System  o :   § Extremities  § : no edema  · Gastrointestinal  o Abdominal Examination  o : normal bowel sounds. Generally non-tender except over LUQ. No guarding or rebound.   · Skin and Subcutaneous Tissue  o General Inspection  o : No lesions noted over lower rib cage, chest or upper back.   · Neurologic  o Mental Status Examination  o :   § Orientation  § : grossly oriented to person, place and time  o Gait and Station  o :   § Gait Screening  § : normal gait     MSK: point tenderness over lower ribs anteriorly only. Both sides equally tender. No obvious signs of inflammation, no rash or lesions noted over involved area.           Assessment  · Sleep apnea     780.57/G47.30  Chronic hx. Pt currently noncompliant with CPAP use. She has f/u with pulm on 01/05/21, advised pt to discussed mask fitting vs nasal prong options for CPAP use.   · Rib pain     786.50/R07.81  Acute onset of bilateral lower rib cage pain. Exact etiology is unclear etiology at this time. MSK related pain secondary to recent weight gain and ill fitting bras vs costochondritis. Unremarkable lung exam per above. No indications for imaging. Conservative tx with voltaren gel, prn Tylenol. Encourage patient to resize her bras and other apparel in light of weight to gain to see if this will help her discomfort. Reasons to " return discussed.   · Weight gain     783.1/R63.5  10lb weight gain since March. Encourage exercise for weight loss.     Problems Reconciled  Plan  · Orders  o ACO-39: Current medications updated and reviewed (1159F, ) - - 12/08/2020  · Medications  o Voltaren 1 % topical gel   SIG: apply 2 grams to the affected area(s) by topical route 4 times per day for 14 days   DISP: (1) Tube with 1 refills  Prescribed on 12/08/2020     o Tylenol 325 mg oral tablet   SIG: take 2 tablets by oral route every 6 hours as needed for 30 days   DISP: (120) Tablet with 0 refills  Prescribed on 12/08/2020     o Medications have been Reconciled  o Transition of Care or Provider Policy  · Instructions  o Patient was educated/instructed on their diagnosis, treatment and medications prior to discharge from the clinic today.  o Call the office with any concerns or questions.  o Risks, benefits, and alternatives were discussed with the patient. The patient is aware of risks associated with:  o Chronic conditions reviewed and taken into consideration for today's treatment plan.  · Disposition  o Call or Return if symptoms worsen or persist.            Electronically Signed by: Clari Ho MD -Author on December 8, 2020 01:00:07 PM

## 2021-05-14 VITALS
SYSTOLIC BLOOD PRESSURE: 120 MMHG | TEMPERATURE: 96.2 F | OXYGEN SATURATION: 99 % | HEART RATE: 80 BPM | HEIGHT: 63 IN | DIASTOLIC BLOOD PRESSURE: 72 MMHG | BODY MASS INDEX: 36.01 KG/M2 | WEIGHT: 203.25 LBS

## 2021-05-14 VITALS
WEIGHT: 200 LBS | DIASTOLIC BLOOD PRESSURE: 82 MMHG | OXYGEN SATURATION: 96 % | HEART RATE: 93 BPM | SYSTOLIC BLOOD PRESSURE: 148 MMHG | BODY MASS INDEX: 35.44 KG/M2 | RESPIRATION RATE: 16 BRPM | HEIGHT: 63 IN | TEMPERATURE: 97.7 F

## 2021-05-14 VITALS
DIASTOLIC BLOOD PRESSURE: 77 MMHG | BODY MASS INDEX: 34.88 KG/M2 | HEIGHT: 64 IN | SYSTOLIC BLOOD PRESSURE: 129 MMHG | HEART RATE: 81 BPM | WEIGHT: 204.31 LBS | OXYGEN SATURATION: 96 % | TEMPERATURE: 98.5 F | RESPIRATION RATE: 18 BRPM

## 2021-05-14 VITALS
BODY MASS INDEX: 36.14 KG/M2 | HEART RATE: 103 BPM | SYSTOLIC BLOOD PRESSURE: 128 MMHG | WEIGHT: 204 LBS | OXYGEN SATURATION: 96 % | DIASTOLIC BLOOD PRESSURE: 52 MMHG | TEMPERATURE: 98.4 F | HEIGHT: 63 IN

## 2021-05-14 VITALS
BODY MASS INDEX: 35.26 KG/M2 | WEIGHT: 199 LBS | HEIGHT: 63 IN | DIASTOLIC BLOOD PRESSURE: 82 MMHG | SYSTOLIC BLOOD PRESSURE: 130 MMHG | HEART RATE: 94 BPM

## 2021-05-14 VITALS
WEIGHT: 202 LBS | HEART RATE: 102 BPM | BODY MASS INDEX: 35.79 KG/M2 | HEIGHT: 63 IN | DIASTOLIC BLOOD PRESSURE: 74 MMHG | SYSTOLIC BLOOD PRESSURE: 118 MMHG

## 2021-05-14 NOTE — PROGRESS NOTES
Progress Note      Patient Name: Silvia Massey   Patient ID: [id number]   Sex: Female   YOB: 1960    Primary Care Provider: Louie Jang MD   Referring Provider: Louie Jang MD    Visit Date: December 17, 2020    Provider: Louie Jang MD   Location: [facility]   Location Address: [address] [place] [address]  [id number]   Location Phone: [phone]          Chief Complaint  · Follow up  · Back pain on right side for 2 weeks      History Of Present Illness  Silvia Massey is a 60 year old /Black female who presents for evaluation and treatment of:      pt reports having pain along her rib cage on right side for approx. 2 weeks. pt denies any aggravating or relieving factors. pt reports gaining weight and thinks it is possibly related to tight bra strap.    HTN- pt denies HAs, dizziness, CP. pt reports home readings typically good 120s/80s.   DM2- pt reports having ferocious appetite. pt cont to gain weight. pt is open to trying Trulicity again.    HLD- doing well on statin  lumbago - pt is on gabapentin to help  insomnia- pt reports waking up at 0200 regularly without being able to go back to sleep. pt reports playing games on her phone when she wakes up. pt ruiz snot use sound soother. pt reports having glass of wine every night with dinner that seems to help with sleep as well.       Past Medical History  Disease Name Date Onset Notes   Anemia --  --    Arthritis --  --    CHF (congestive heart failure) --  --    Depression --  --    Diabetes mellitus, type 2 --  --    Forgetfulness --  --    Gastroesophageal reflux disease --  --    GERD (gastroesophageal reflux disease) --  --    Hypertension --  --    Night sweats --  --    Screening for breast cancer 6/2019 normal    Sinus trouble --  --    Sleep apnea --  --    Voice hoarseness --  --          Past Surgical History  Procedure Name Date Notes   Blephroplasty 07/09/19  --    Colonoscopy 2018 wnl   EGD 2017 --    Lypoma Removal --  --    Removal of ovary --  --          Medication List  Name Date Started Instructions   albuterol sulfate 2.5 mg /3 mL (0.083 %) inhalation solution for nebulization  2.5 mg INH RTQID   aspirin 81 mg oral tablet,delayed release (DR/EC) 08/20/2020 Take 1 tablet by mouth once daily   baclofen 10 mg oral tablet 08/28/2020 take 1 tablet by oral route 3 times a day prn pain   carvedilol 3.125 mg oral tablet 08/19/2020 Take 1 tablet by mouth twice daily   Diflucan 200 mg oral tablet 09/11/2020 take 1 tablet (200 mg) by oral route once. repeat one dose three days later. prn yeast infections   Dulera 200-5 mcg/actuation inhalation HFA aerosol inhaler  inhale 2 puffs INH RTBID   Entresto 49-51 mg oral tablet 05/18/2020 TAKE 1 TABLET BY MOUTH TWICE DAILY   Farxiga 10 mg oral tablet 04/24/2020 take 1 tablet (10 mg) by oral route once daily in the morning for 90 days   gabapentin 600 mg oral tablet 01/09/2019 take 1 tablet by oral route 3 times a day prn pain   glipizide 10 mg oral tablet 12/18/2020 TAKE 1 TABLET BY MOUTH TWICE DAILY WITH MEALS   ProAir HFA 90 mcg/actuation inhalation HFA aerosol inhaler  inhale 2 puffs by inhalation route for shortness of breath   rosuvastatin 10 mg oral tablet 06/22/2020 TAKE 1 TABLET BY MOUTH ONCE DAILY AT BEDTIME FOR 90 DAYS   spironolactone 25 mg oral tablet 12/18/2020 Take 1 tablet by mouth once daily   Trulicity 1.5 mg/0.5 mL subcutaneous pen injector 12/17/2020 inject 0.5mL (1.5 mg) subQ every 7 days in the abdomen, thigh, or upper arm rotating injection sites   Tylenol 325 mg oral tablet 12/08/2020 take 2 tablets by oral route every 6 hours as needed for 30 days   Vitamin D2 1,250 mcg (50,000 unit) oral capsule 11/23/2020 take 1 capsule by oral route once weekly   Voltaren 1 % topical gel 12/08/2020 apply 2 grams to the affected area(s) by topical route 4 times per day for 14 days         Allergy List  Allergen Name Date  Reaction Notes   NO KNOWN DRUG ALLERGIES --  --  --        Allergies Reconciled  Family Medical History  Disease Name Relative/Age Notes   Heart Disease Father/   --    Cancer, Unspecified Mother/   --    Diabetes Mellitus, Type II Aunt/  Brother/  Uncle/   --    Uterine Cancer, Family History  --    Family history of cancer Mother/   Mother   Family history of heart disease Brother/   Brother   Family history of diabetes mellitus Brother/   Brother         Social History  Finding Status Start/Stop Quantity Notes   Alcohol Current some day --/-- --  occasionally drinks, 1 drink per day, has been drinking for 6-10 years  1 beer a night    --  --/-- --  --    Tobacco Former --/-- --  former smoker  1/2 ppd   Working --  --/-- --  --          Immunizations  NameDate Admin Mfg Trade Name Lot Number Route Inj VIS Given VIS Publication   Hepatitis A01/09/2019 NE Not Entered  NE NE 01/17/2019    Comments:    Wraizccxk13/11/2020 [facility] Fl[employer] Quadrivalent [id number] [facility] 09/11/2020    Comments: pt tolerated well, left office in stable condition   Bpqkwwmwp9098/31/2018 [employer] PNEUMOVAX 23 [id number]  RD 10/31/2018 04/24/2015   Comments: Pt tolerated well, left office in stable condition.   Jolwaojl56/24/2019 [employer] Other TradeName [id number] [facility] 10/24/2019    Comments: patient tolerated well, left office in stable condition         Review of Systems  · Constitutional  o Admits  o : weight gain  o Denies  o : fever, fatigue, weight loss  · HENT  o Denies  o : headaches, lightheadedness  · Cardiovascular  o Denies  o : lower extremity edema, chest pressure, palpitations  · Respiratory  o Denies  o : shortness of breath, wheezing, cough, dyspnea on exertion  · Gastrointestinal  o Denies  o : nausea, vomiting, diarrhea, constipation, abdominal pain      Vitals  Date Time BP Position Site L\R Cuff Size HR RR TEMP (F) WT  HT  BMI kg/m2 BSA m2 O2 Sat FR L/min FiO2 HC       09/24/2020 03:01 /82 Sitting    94 - R   199lbs 0oz 5'  " 3.5\" 34.7 2.01       12/08/2020 11:24 /72 Sitting    80 - R  96.2 203lbs 4oz 5'  3.5\" 35.44 2.03 99 %  21%    12/17/2020 02:58 /52 Sitting    103 - R  98.4 203lbs 16oz 5'  3.5\" 35.57 2.04 96 %  21%          Physical Examination  · Constitutional  o Appearance  o : no acute distress, well-nourished  · Head and Face  o Head  o :   § Inspection  § : atraumatic, normocephalic  · Eyes  o Eyes  o : extraocular movements intact, no scleral icterus, no conjunctival injection  · Ears, Nose, Mouth and Throat  o Ears  o :   § External Ears  § : normal  o Nose  o :   § Intranasal Exam  § : nares patent  o Oral Cavity  o :   § Oral Mucosa  § : moist mucous membranes  · Respiratory  o Respiratory Effort  o : breathing comfortably, symmetric chest rise  · Cardiovascular  o Heart  o :   § Auscultation of Heart  § : regular rate  o Peripheral Vascular System  o :   § Extremities  § : no edema  · Neurologic  o Mental Status Examination  o :   § Orientation  § : grossly oriented to person, place and time  o Gait and Station  o :   § Gait Screening  § : normal gait  · Psychiatric  o General  o : normal mood and affect          Assessment  · Diabetes mellitus, type 2     250.00/E11.9  check labs. cont regimen. switch from Victoza to Trulicity and monitor for effectiveness. consider inc to 3mg dose afer 4-6 weeks.   · Hyperlipidemia     272.4/E78.5  cont statin. check labs.   · Insomnia, unspecified     780.52/G47.00  start trazadone in lieu of alcohol. also discussed sleep hygiene.   · Lumbago     724.2/M54.5  cont gabapentin as needed. encouraged weight loss.   · Hypertension     401.9/I10  well controlled on current regimen.     Problems Reconciled  Plan  · Orders  o Diabetes 1 Panel (CMP, Lipid, A1c) [facility] (15493, 69748, 67878) - 250.00/E11.9 - 12/17/2020  o CBC with Auto Diff [facility] (03321) - 250.00/E11.9 - 12/17/2020  o ACO-39: Current medications updated and reviewed (1159F, ) - - 12/17/2020  o ACO-15: Pneumococcal " Vaccine Administered or Previously Received [facility] (4040F) - - 12/17/2020  o ACO-14: Influenza immunization administered or previously received [facility] () - - 12/17/2020  o ACO-20: Screening Mammography documented and reviewed [facility] () - - 12/17/2020  o ACO-19: Colorectal cancer screening results documented and reviewed (3017F) - - 12/17/2020  o ACO-41: Dilated Diabetic eye exam completed this year and results in chart/reviewed (2022F) - - 12/17/2020  · Medications  o trazodone 50 mg oral tablet   SIG: take 1 tablet (50 mg) by oral route once daily at bedtime for 30 days   DISP: (30) Tablet with 0 refills  Prescribed on 12/18/2020     o Trulicity 1.5 mg/0.5 mL subcutaneous pen injector   SIG: inject 0.5mL (1.5 mg) subQ every 7 days in the abdomen, thigh, or upper arm rotating injection sites   DISP: (13) Pre-filled Pen Syringe with 3 refills  Adjusted on 12/17/2020     o Medications have been Reconciled  o Transition of Care or Provider Policy  · Instructions  o Patient was educated/instructed on their diagnosis, treatment and medications prior to discharge from the clinic today.  · Disposition  o f/u in 3 months  o labs done in clinic            Electronically Signed by: Louie Jang MD -Author on December 18, 2020 03:51:15 PM

## 2021-05-14 NOTE — PROGRESS NOTES
"   Progress Note      Patient Name: Silvia Massey   Patient ID: [id number]   Sex: Female   YOB: 1960    Primary Care Provider: oLuie Jang MD   Referring Provider: Louie Jang MD    Visit Date: April 14, 2021    Provider: Arnulfo Santana MD   [facility]   Location Address: [address] [place] [address], [facility]   [place], [place]  [id number]   Location Phone: [phone]          Chief Complaint     CHF.       History Of Present Illness  REFERRING CARE PROVIDER: Louie Jang MD   Silvia Massey is a 61 year old /Black female with nonischemic cardiomyopathy with ejection fraction of 50%, nonocclusive coronary artery disease, hypertension and dyslipidemia who has had some issues with left sided chest pain a lot of times occurring with ambulation. She has reported some increased dyspnea on exertion as well. Mild weight gain of about 3 pounds, chronic lower extremity edema, unchanged.   PAST MEDICAL HISTORY: Anemia; Arthritis; Congestive heart failure; Coronary artery disease, Nonocclusive; Depression; Diabetes mellitus, type 2; GERD; Hypertension; Sleep apnea.   PSYCHOSOCIAL HISTORY: Moderate use of alcohol. Previous use of tobacco, but quit.   CURRENT MEDICATIONS: Medication list was reviewed and is as documented.      ALLERGIES: No known drug allergies.       Review of Systems  · Cardiovascular  o Admits  o : shortness of breath while walking or lying flat  o Denies  o : palpitations (fast, fluttering, or skipping beats), swelling (feet, ankles, hands), chest pain or angina pectoris   · Respiratory  o Denies  o : chronic or frequent cough      Vitals  Date Time BP Position Site L\R Cuff Size HR RR TEMP (F) WT  HT  BMI kg/m2 BSA m2 O2 Sat FR L/min FiO2 HC       04/14/2021 01:17 /74 Sitting    102 - R   202lbs 0oz 5'  3\" 35.78 2.02             Physical Examination  · Constitutional  o Appearance  o : Awake, alert, in no acute distress. "   · Eyes  o Conjunctivae  o : Normal.  · Ears, Nose, Mouth and Throat  o Oral Cavity  o :   § Oral Mucosa  § : Normal.  · Neck  o Inspection/Palpation  o : No JVD. Good carotid upstroke. No thyromegaly.  · Respiratory  o Respiratory  o : Good respiratory effort. Clear to percussion and auscultation.  · Cardiovascular  o Heart  o :   § Auscultation of Heart  § : S1, S2 normal. Regular rate and rhythm without murmurs, gallops, or rubs.  o Peripheral Vascular System  o :   § Extremities  § : Good femoral and pedal pulses. Trace lower extremity edema.  · Gastrointestinal  o Abdominal Examination  o : Soft. No tenderness or masses felt. No hepatosplenomegaly. Abdominal aorta is not palpable.  · Labs  o Labs  o : Hgb was 14.4, creatinine level is 0.84, potassium is 4.6. She did undergo a treadmill stress test but had an underlying incomplete left bundle branch block abnormality with limited diagnostic accurancy. The patient did not have anginal chest pain, was able to exercise for 6 minutes.           Assessment     1.  Chest discomfort with ambulation, concerning for angina. Stress test is nondiagnostic due to underlying left bundle branch block, incomplete left bundle branch block abnormality. We will repeat Lexiscan nuclear stress test for further assessment of ischemic heart disease.   2.  Nonischemic cardiomyopathy. Previously low EF at 15% with some worsening shortness of breath symptoms. Recommend repeat an echocardiogram to reassess left ventricular function especially given change in her baseline EKG.   3.  Hypertension, controlled.  4.  Coronary artery disease, nonocclusive in nature. Continue with chronic aspirin 81 mg once a day.        Arnulfo Santana MD  /               Electronically Signed by: Noris Villalta-, OT -Author on April 21, 2021 10:00:52 AM  Electronically Co-signed by: Arnulfo Santana MD -Reviewer on April 26, 2021 09:40:00 AM

## 2021-05-14 NOTE — PROGRESS NOTES
"   Progress Note      Patient Name: Silvia Massey   Patient ID: [id number]   Sex: Female   YOB: 1960    Primary Care Provider: Louie Jang MD   Referring Provider: Louie Jang MD    Visit Date: April 5, 2021    Provider: Louie Jang MD   Location: [facility] [place]   Location Address: [address] [place] [address]; [address] [place] [address]  [id number]   Location Phone: [phone]          Chief Complaint  · follow up- HTN, HLD, DM2      History Of Present Illness  Silvia Massey is a 60 year old /Black female who presents for evaluation and treatment of:      HTN- pt denies HAs, dizziness, CP  DM2- pt thinks she has gained weight. pt reports eating candy over the easter holiday that raised her sugars. pt has not been exercising.   HLD- doing well on statin.   pt reports having left side axillary pain every day for the last 2 weeks. pt notices pain is worse with walking. pt denies nausea and diaphoresis associated with pain.      Patient is complaining of a lump on her shoulder. It has been there for a while. The patient thinks it has been evaluated before, but is now concerned about it again.  pt reports unchanged. lesion is soft. no erythema o exudate.     Patient has had her COVID-19 [employer] vaccine.       Vitals  Date Time BP Position Site L\R Cuff Size HR RR TEMP (F) WT  HT  BMI kg/m2 BSA m2 O2 Sat FR L/min FiO2 HC       04/05/2021 08:33 /77 Sitting    81 - R 18 98.5 204lbs 5oz 5'  4\" 35.07 2.05 96 %  21%          Physical Examination  · Constitutional  o Appearance  o : no acute distress, well-nourished  · Head and Face  o Head  o :   § Inspection  § : atraumatic, normocephalic  · Eyes  o Eyes  o : extraocular movements intact, no scleral icterus, no conjunctival injection  · Ears, Nose, Mouth and Throat  o Ears  o :   § External Ears  § : normal  o Nose  o :   § Intranasal Exam  § : nares patent  o Oral " Cavity  o :   § Oral Mucosa  § : moist mucous membranes  · Respiratory  o Respiratory Effort  o : breathing comfortably, symmetric chest rise  o Auscultation of Lungs  o : clear to asculatation bilaterally, no wheezes, rales, or rhonchii  · Cardiovascular  o Heart  o :   § Auscultation of Heart  § : regular rate and rhythm, no murmurs, rubs, or gallops  o Peripheral Vascular System  o :   § Extremities  § : no edema  · Skin and Subcutaneous Tissue  o General Inspection  o : +soft tissue nodule, soft with fluid without overlying erythema or induration.   · Neurologic  o Mental Status Examination  o :   § Orientation  § : grossly oriented to person, place and time  o Gait and Station  o :   § Gait Screening  § : normal gait  · Psychiatric  o General  o : normal mood and affect          Assessment  · Screening for depression     V79.0/Z13.89  · Chest pain     786.50/R07.9  will obtain treadmill stress test to further eval. cont to f/u with cardiolgoy  · Diabetes mellitus, type 2     250.00/E11.9  check labs. inc Trulicity to 3mg dose to help with appetite suppression and weight loss efforts.   · Hyperlipidemia     272.4/E78.5  cont statin. check labs.   · Hypertension     401.9/I10  well controlled on current regimen.   · Epidermal inclusion cyst     706.2/L72.0  discussed removal options and pt defers for now.     Problems Reconciled  Plan  · Orders  o Annual depression screening, 15 minutes (, [address]21) - V79.0/Z13.89 - 04/05/2021  o Stress Test (Treadmill Exercise) (75812) - 786.50/R07.9 - 04/05/2021  o ACO-41: Dilated Diabetic eye exam completed this year and results in chart/reviewed (2022F) - 250.00/E11.9 - 04/05/2021  o ACO-18: Positive screen for clinical depression using a standardized tool and a follow-up plan documented () - - 04/05/2021  o ACO-14: Influenza immunization administered or previously received [facility] () - - 04/05/2021  o ACO-20: Screening Mammography documented and reviewed [facility] ()  - - 04/05/2021  o ACO-19: Colorectal cancer screening results documented and reviewed (3017F) - - 04/05/2021  o ACO-39: Current medications updated and reviewed (1159F, ) - - 04/05/2021  o PHQ-9 completed in office (XPHQ9) - - 04/05/2021   13  · Medications  o Trulicity 3 mg/0.5 mL subcutaneous pen injector   SIG: inject 0.5 milliliter (3 mg) by subcutaneous route every 7 days   DISP: (13) Pre-filled Pen Syringe with 3 refills  Adjusted on 04/05/2021     o Medications have been Reconciled  o Transition of Care or Provider Policy  · Instructions  o Depression Screen completed and scanned into the EMR under the designated folder within the patient's documents.  o Patient was educated/instructed on their diagnosis, treatment and medications prior to discharge from the clinic today.  · Disposition  o f/u in 3 months  o labs done in clinic            Electronically Signed by: Louie Jang MD -Author on April 5, 2021 01:09:01 PM

## 2021-05-15 VITALS
HEART RATE: 74 BPM | BODY MASS INDEX: 34.91 KG/M2 | WEIGHT: 197 LBS | SYSTOLIC BLOOD PRESSURE: 140 MMHG | HEIGHT: 63 IN | DIASTOLIC BLOOD PRESSURE: 76 MMHG

## 2021-05-15 VITALS
DIASTOLIC BLOOD PRESSURE: 73 MMHG | HEIGHT: 64 IN | OXYGEN SATURATION: 98 % | WEIGHT: 191 LBS | SYSTOLIC BLOOD PRESSURE: 127 MMHG | HEART RATE: 83 BPM | BODY MASS INDEX: 32.61 KG/M2

## 2021-05-15 VITALS
HEART RATE: 95 BPM | SYSTOLIC BLOOD PRESSURE: 140 MMHG | BODY MASS INDEX: 35.17 KG/M2 | TEMPERATURE: 96.7 F | WEIGHT: 198.5 LBS | OXYGEN SATURATION: 96 % | HEIGHT: 63 IN | DIASTOLIC BLOOD PRESSURE: 82 MMHG

## 2021-05-15 VITALS
BODY MASS INDEX: 34.23 KG/M2 | SYSTOLIC BLOOD PRESSURE: 147 MMHG | WEIGHT: 200.5 LBS | HEIGHT: 64 IN | HEART RATE: 83 BPM | DIASTOLIC BLOOD PRESSURE: 72 MMHG

## 2021-05-15 VITALS
HEART RATE: 92 BPM | OXYGEN SATURATION: 99 % | TEMPERATURE: 97.8 F | WEIGHT: 190 LBS | BODY MASS INDEX: 33.66 KG/M2 | HEIGHT: 63 IN | DIASTOLIC BLOOD PRESSURE: 72 MMHG | SYSTOLIC BLOOD PRESSURE: 138 MMHG

## 2021-05-15 VITALS
BODY MASS INDEX: 34.02 KG/M2 | HEIGHT: 63 IN | HEART RATE: 72 BPM | WEIGHT: 192 LBS | DIASTOLIC BLOOD PRESSURE: 84 MMHG | SYSTOLIC BLOOD PRESSURE: 128 MMHG

## 2021-05-15 VITALS
SYSTOLIC BLOOD PRESSURE: 132 MMHG | RESPIRATION RATE: 16 BRPM | DIASTOLIC BLOOD PRESSURE: 80 MMHG | WEIGHT: 198.25 LBS | BODY MASS INDEX: 35.12 KG/M2 | TEMPERATURE: 97.3 F | HEART RATE: 95 BPM | HEIGHT: 63 IN | OXYGEN SATURATION: 96 %

## 2021-05-15 VITALS
HEART RATE: 70 BPM | SYSTOLIC BLOOD PRESSURE: 124 MMHG | BODY MASS INDEX: 32.44 KG/M2 | HEIGHT: 64 IN | OXYGEN SATURATION: 98 % | DIASTOLIC BLOOD PRESSURE: 68 MMHG

## 2021-05-15 VITALS
SYSTOLIC BLOOD PRESSURE: 138 MMHG | RESPIRATION RATE: 18 BRPM | BODY MASS INDEX: 33.93 KG/M2 | HEART RATE: 84 BPM | OXYGEN SATURATION: 98 % | WEIGHT: 191.5 LBS | DIASTOLIC BLOOD PRESSURE: 70 MMHG | TEMPERATURE: 98 F | HEIGHT: 63 IN

## 2021-05-15 VITALS
HEART RATE: 77 BPM | TEMPERATURE: 97.8 F | RESPIRATION RATE: 18 BRPM | SYSTOLIC BLOOD PRESSURE: 150 MMHG | OXYGEN SATURATION: 98 % | WEIGHT: 194.12 LBS | HEIGHT: 64 IN | DIASTOLIC BLOOD PRESSURE: 70 MMHG | BODY MASS INDEX: 33.14 KG/M2

## 2021-05-15 VITALS
SYSTOLIC BLOOD PRESSURE: 134 MMHG | HEART RATE: 100 BPM | TEMPERATURE: 97.8 F | HEIGHT: 63 IN | BODY MASS INDEX: 33.84 KG/M2 | WEIGHT: 191 LBS | OXYGEN SATURATION: 95 % | DIASTOLIC BLOOD PRESSURE: 72 MMHG

## 2021-05-15 VITALS
SYSTOLIC BLOOD PRESSURE: 154 MMHG | HEIGHT: 64 IN | HEART RATE: 85 BPM | OXYGEN SATURATION: 96 % | DIASTOLIC BLOOD PRESSURE: 87 MMHG | WEIGHT: 191 LBS | BODY MASS INDEX: 32.61 KG/M2

## 2021-05-15 VITALS
HEART RATE: 96 BPM | BODY MASS INDEX: 34.38 KG/M2 | HEIGHT: 63 IN | SYSTOLIC BLOOD PRESSURE: 112 MMHG | DIASTOLIC BLOOD PRESSURE: 72 MMHG | WEIGHT: 194 LBS

## 2021-05-15 VITALS
BODY MASS INDEX: 34.38 KG/M2 | HEART RATE: 80 BPM | TEMPERATURE: 97.6 F | WEIGHT: 194 LBS | OXYGEN SATURATION: 97 % | SYSTOLIC BLOOD PRESSURE: 146 MMHG | DIASTOLIC BLOOD PRESSURE: 88 MMHG | HEIGHT: 63 IN

## 2021-05-15 VITALS
BODY MASS INDEX: 33.89 KG/M2 | HEIGHT: 63 IN | HEART RATE: 88 BPM | WEIGHT: 191.25 LBS | OXYGEN SATURATION: 97 % | DIASTOLIC BLOOD PRESSURE: 84 MMHG | SYSTOLIC BLOOD PRESSURE: 133 MMHG

## 2021-05-15 VITALS
HEIGHT: 64 IN | OXYGEN SATURATION: 98 % | HEART RATE: 83 BPM | BODY MASS INDEX: 32.44 KG/M2 | SYSTOLIC BLOOD PRESSURE: 148 MMHG | DIASTOLIC BLOOD PRESSURE: 89 MMHG

## 2021-05-16 VITALS
HEART RATE: 92 BPM | WEIGHT: 178 LBS | DIASTOLIC BLOOD PRESSURE: 82 MMHG | SYSTOLIC BLOOD PRESSURE: 136 MMHG | HEIGHT: 63 IN | TEMPERATURE: 96.8 F | OXYGEN SATURATION: 99 % | BODY MASS INDEX: 31.54 KG/M2

## 2021-05-16 VITALS
DIASTOLIC BLOOD PRESSURE: 75 MMHG | BODY MASS INDEX: 34.2 KG/M2 | OXYGEN SATURATION: 98 % | HEART RATE: 81 BPM | HEIGHT: 63 IN | WEIGHT: 193 LBS | SYSTOLIC BLOOD PRESSURE: 115 MMHG

## 2021-05-16 VITALS
BODY MASS INDEX: 31.71 KG/M2 | DIASTOLIC BLOOD PRESSURE: 70 MMHG | HEART RATE: 100 BPM | HEIGHT: 63 IN | SYSTOLIC BLOOD PRESSURE: 102 MMHG | WEIGHT: 179 LBS

## 2021-05-16 VITALS
BODY MASS INDEX: 33.71 KG/M2 | HEART RATE: 92 BPM | DIASTOLIC BLOOD PRESSURE: 64 MMHG | TEMPERATURE: 98.1 F | WEIGHT: 190.25 LBS | OXYGEN SATURATION: 99 % | SYSTOLIC BLOOD PRESSURE: 138 MMHG | HEIGHT: 63 IN

## 2021-05-16 VITALS
SYSTOLIC BLOOD PRESSURE: 108 MMHG | BODY MASS INDEX: 32.25 KG/M2 | HEART RATE: 106 BPM | DIASTOLIC BLOOD PRESSURE: 76 MMHG | HEIGHT: 63 IN | WEIGHT: 182 LBS

## 2021-05-16 VITALS
HEIGHT: 63 IN | SYSTOLIC BLOOD PRESSURE: 130 MMHG | OXYGEN SATURATION: 95 % | DIASTOLIC BLOOD PRESSURE: 78 MMHG | TEMPERATURE: 97.4 F | BODY MASS INDEX: 32.14 KG/M2 | WEIGHT: 181.37 LBS | HEART RATE: 105 BPM

## 2021-05-16 VITALS — BODY MASS INDEX: 31.41 KG/M2 | RESPIRATION RATE: 18 BRPM | HEIGHT: 64 IN | WEIGHT: 184 LBS

## 2021-05-16 VITALS
SYSTOLIC BLOOD PRESSURE: 112 MMHG | DIASTOLIC BLOOD PRESSURE: 64 MMHG | HEART RATE: 89 BPM | WEIGHT: 187.25 LBS | HEIGHT: 63 IN | OXYGEN SATURATION: 97 % | TEMPERATURE: 97.6 F | BODY MASS INDEX: 33.18 KG/M2

## 2021-05-16 VITALS — BODY MASS INDEX: 33.73 KG/M2 | HEIGHT: 63 IN | RESPIRATION RATE: 16 BRPM | WEIGHT: 190.37 LBS

## 2021-05-20 ENCOUNTER — OFFICE VISIT CONVERTED (OUTPATIENT)
Dept: SURGERY | Facility: CLINIC | Age: 61
End: 2021-05-20
Attending: SURGERY

## 2021-05-27 ENCOUNTER — HOSPITAL ENCOUNTER (OUTPATIENT)
Dept: ULTRASOUND IMAGING | Facility: HOSPITAL | Age: 61
Discharge: HOME | End: 2021-05-27
Attending: SURGERY

## 2021-05-28 VITALS
HEIGHT: 63 IN | SYSTOLIC BLOOD PRESSURE: 116 MMHG | WEIGHT: 189.06 LBS | OXYGEN SATURATION: 97 % | DIASTOLIC BLOOD PRESSURE: 70 MMHG | BODY MASS INDEX: 33.5 KG/M2 | HEART RATE: 88 BPM | RESPIRATION RATE: 16 BRPM

## 2021-05-28 NOTE — PROGRESS NOTES
Patient: SACHA COOMBS     Acct: [id number]     Report: #[id number]  UNIT #: [id number]     : 1960    Encounter Date:2019  PRIMARY CARE: FELIPE GALLEGOS  ***Signed***  --------------------------------------------------------------------------------------------------------------------  Chief Complaint      Encounter Date      2019            Primary Care Provider      FELIPE GALLEGOS            Referring Provider      FELIPE GALLEGOS            Patient Complaint      Patient is complaining of      new patient/soa            VITALS      Height 5 ft 3 in / 160.02 cm      Weight 189 lbs 1 oz / 85.094667 kg      BSA 1.89 m2      BMI 33.5 kg/m2      Pulse 88      Respirations 16      Blood Pressure 116/70 Sitting, Right Arm      Pulse Oximetry 97%, room air      Initial Exhaled Nitrous Oxide      Date:  2019      Exhaled Nitrous Oxide Results:  22            HPI      The patient is a 58 year old female who was hospitalized in 10/2018 with     worsening shortness of breath and chest pain. At that time, she was found to     have congestive heart failure with EF of 30% and had bilateral pleural effusion.    We had offered thoracentesis.  The patient declined thoracentesis at that time.     She was diuresed and had cardiac catheterization which was normal.  The patient     started following Dr. Santana after that. He ordered an echocardiogram in 2019.     The patient is short of breath with exertion, uses albuterol inhaler 2-3 times a    week.  She has a nebulizer machine at home, but does not use it as often. She     was seeing Dr. Dickson and bronchoscopy prior to hospitalization. She followed up    with him afterwards and was told that all of the bronchoscopy results were     negative.  She has a history of a recurrent pneumonia in the past.  She does not    have a history of asthma. She use to smoke 1/2 to 1 pack per day for several     years, quit smoking more than 20 years ago.  She has  bilateral flank discomfort.    She has severe reflux spells.  The patient was diagnosed with COPD and asthma     from pulmonologist in [place], but she does not think so, she wants to have     repeat tests.            ROS      Constitutional:  Complains of: Fatigue; Denies: Fever, Weight gain, Weight loss,    Chills, Insomnia, Other      Respiratory/Breathing:  Complains of: Shortness of air, Wheezing, Cough; Denies:    Hemoptysis, Pleuritic pain, Other      Endocrine:  Denies: Polydipsia, Polyuria, Heat/cold intolerance, Abnorml     menstrual pattern, Diabetes, Other      Eyes:  Denies: Blurred vision, Vision Changes, Other      Ears, nose, mouth, throat:  Denies: Mouth lesions, Thrush, Throat pain,     Hoarseness, Allergies/Hay Fever, Post Nasal Drip, Headaches, Recent Head Injury,    Nose Bleeding, Neck Stiffness, Thyroid Mass, Hearing Loss, Ear Fullness, Dry     Mouth, Nasal or Sinus Pain, Dry Lips, Nasal discharge, Nasal congestion, Other      Cardiovascular:  Denies: Palpitations, Syncope, Claudication, Chest Pain, Wake     up Gasping for air, Leg Swelling, Irregular Heart Rate, Cyanosis, Dyspnea on     Exertion, Other      Gastrointestinal:  Denies: Nausea, Constipation, Diarrhea, Abdominal pain,     Vomiting, Difficulty Swallowing, Reflux/Heartburn, Dysphagia, Jaundice, Bloati    ng, Melena, Bloody stools, Other      Genitourinary:  Denies: Urinary frequency, Incontinence, Hematuria, Urgency,     Nocturia, Dysuria, Testicular problems, Other      Musculoskeletal:  Denies: Joint Pain, Joint Stiffness, Joint Swelling, Myalgias,    Other      Hematologic/lymphatic:  DENIES: Lymphadenopathy, Bruising, Bleeding tendencies,     Other      Neurological:  Denies: Headache, Numbness, Weakness, Seizures, Other      Psychiatric:  Denies: Anxiety, Appropriate Effect, Depression, Other      Sleep:  No: Excessive daytime sleep, Morning Headache?, Snoring, Insomnia?, Stop    breathing at sleep?, Other       Integumentary:  Denies: Rash, Dry skin, Skin Warm to Touch, Other      Immunologic/Allergic:  Denies: Latex allergy, Seasonal allergies, Asthma,     Urticaria, Eczema, Other      Immunization status:  No: Up to date            FAMILY/SOCIAL/MEDICAL HX      Surgical History:  Yes: Other Surgeries (ovary , falopian tube removed,cyst     removed); No: AAA Repair, Abdominal Surgery, Adenoids, Angioplasty,     Appendectomy, Back Surgery, Bladder Surgery, Bowel Surgery, Breast Surgery,     CABG, Carotid Stenosis, Cholecystectomy, Ear Surgery, Eye Surgery, Head Surgery,    Hernia Surgery, Kidney Surgery, Nose Surgery, Oral Surgery, Orthopedic Surgery,     Prostatectomy, Rectal Surgery, Spinal Surgery, Testicular Surgery, Throat     Surgery, Tonsils, Valve Replacement, Vascular Surgery      Heart - Family Hx:  Father      Diabetes - Family Hx:  Aunt      Cancer/Type - Family Hx:  Mother (uterus)      Is Father Still Living?:  No      Is Mother Still Living?:  No       Family History:  Yes      Social History:  No Tobacco Use, No Alcohol Use, No Recreational Drug use      Smoking status:  Former smoker (1 ppd for 15 years quit for 22 years)      Oophorectomy:  Left      Anticoagulation Therapy:  No      Antibiotic Prophylaxis:  No      Medical History:  Yes: Arthritis, Congestive Heart Failu, Diabetes,     Hemorrhoids/Rectal Prob (GERD, NAUSEA,HX  COLON POLYPS), High Blood Pressure (ON    MED), Shortness Of Breath (PNEUMONIA 10/2018, LUNG BIOPSY 10/2018),     Miscellaneous Medical/oth; No: Alcoholism, Allergies, Anemia, Asthma, Atrial     Fibrillation, Blood Disease, Broken Bones, Cataracts, Chemical Dependency,     Chemotherapy/Cancer, Chronic Bronchitis/COPD, Emphysema, Chronic Liver Disease,     Colon Trouble, Colitis, Diverticulitis, Deafness or Ringing Ears, Convulsions,     Depression, Anxiety, Bipolar Disorder, PTSD, Epilepsy, Seizures, Forgetfullness,    Glaucoma, Gall Stones, Gout, Head Injury, Heart Attack, Heart  Murmur, GERD,     Hepatitis, Hiatal Hernia, High Cholesterol, HIV (Do not ask - volu, Jaundice,     Kidney or Bladder Disease, Kidney Stones, Migrane Headaches, Mitral Valve     Prolapse, Night sweats, Phlebitis, Psychiatric Care, Reflux Disease, Rheumatic     Fever, Sexually Transmitted Dis, Sinus Trouble, Skin Disease/Psoriais/Ecz,     Stroke, Thyroid Problem, Tuberculosis or Pos TB Te      Psychiatric History      none            PREVENTION      Hx Influenza Vaccination:  Yes      Date Influenza Vaccine Given:  Dec 1, 2018      Influenza Vaccine Declined:  No      2 or More Falls Past Year?:  No      Fall Past Year with Injury?:  No      Hx Pneumococcal Vaccination:  Yes      Encouraged to follow-up with:  PCP regarding preventative exams.      Chart initiated by      cece gates ma            ALLERGIES/MEDICATIONS      Allergies:        Coded Allergies:             NO KNOWN ALLERGIES (Unverified , 2/1/19)      Medications    Last Reconciled on 2/1/19 08:34 by SANTIAGO OCHOA MD      Gabapentin (Gabapentin) 250 Mg/5 Ml Solution      600 MG PO BID, #720 ML         Reported         2/1/19       Liraglutide (Victoza Inj) 0.1 Ml Cartridge      0.6 MG SQ QDAY, EA         Reported         11/5/18       Empagliflozin (Jardiance) 10 Mg Tablet      10 MG PO QDAY for 30 Days, #30 TAB         Reported         11/5/18       Aspirin EC (Aspirin EC) 81 Mg Tablet.dr      81 MG PO QDAY for 30 Days, #30 TAB.SR         Prov: Kelle Robertson         10/24/18       Spironolactone (Aldactone) 25 Mg Tablet      25 MG PO QDAY for 30 Days, #30 TAB         Prov: Kelle Robertson         10/24/18       Carvedilol (Carvedilol) 3.125 Mg Tablet      1.5625 MG PO BID for 30 Days, #30 TAB         Prov: Kelle Robertson         10/24/18       Atorvastatin (Atorvastatin) 40 Mg Tablet      40 MG PO HS for 30 Days, #30 TAB         Prov: Kelle Robertson         10/24/18       Ferrous Sulfate (Ferrous Sulfate*) 325 Mg Tablet      325 MG PO QDAY, #90 TAB  5 Refills         Reported         10/22/18       Ergocalciferol (Vitamin D2) 50,000 Unit Capsule      85806 UNITS PO MO for 30 Days, #4 CAP         Reported         10/20/18       Mometasone/Formoterol (Dulera 200 Mcg/5 Mcg) 13 Gm Hfa.aer.ad      2 PUFFS INH RTBID, #1 MDI 0 Refills         Reported         10/20/18       Liraglutide (Victoza 3-Henrry) 0.6 Mg/0.1 Ml Pen.injctr      1.2 MG SUBQ QDAY, PACKAGE         Reported         10/20/18       Sucralfate (Carafate) 1,000 Mg/10 Ml Oral.susp      1000 MG PO BIDAC, #600 ML         Reported         10/20/18       Albuterol (Proair HFA) 8.5 Gm Inh      2 PUFFS INH PRN for SHORTNESS OF BREATH, #1 INH 0 Refills         Reported         10/20/18       NEB-Albuterol Sulf (Albuterol) 2.5 Mg/3 Ml Vial.neb      2.5 MG INH RTQID, #120 NEB 0 Refills         Reported         10/20/18      Current Medications      Current Medications Reviewed 2/1/19            EXAM      CONSTITUTIONAL: Pleasant  female in no acute distress, normal conversant.       EYES : Pink conjunctive, no ptosis, PERRL.       ENMT : Nose and ears appear normal, normal dentition, mild posterior pharyngeal     wall erythema, no sinus tenderness. Mallampati classification 1.        Neck: Nontender, no masses, no thyromegaly, no nodules.      Resp : Normal resp effort, B/l vesicular breath sounds, no wheezing, crackles or    rhonchi.  Resonant to percussion bilaterally.      CVS  : No carotid bruits, s1s2 nl, RRR, no murmur, rubs or gallop, no peripheral    edema.        Chest wall: Normal rise with inspiration, nontender on palpation.      GI   : Abdomen soft, with no masses, no hepatosplenomegaly, no hernias, BS+      MSK  : Normal gait and station, no digital cyanosis or clubbing       Skin : No rashes, ulcerations or lesions, normal turgor and temperature      Neuro: CN II - XII intact, no sensory deficits, DTRs intact and symmetrical, no     motor weakness      Psych: Appropriate affect, A   Vtials       Vitals:             Height 5 ft 3 in / 160.02 cm           Weight 189 lbs 1 oz / 85.795599 kg           BSA 1.89 m2           BMI 33.5 kg/m2           Pulse 88           Respirations 16           Blood Pressure 116/70 Sitting, Right Arm           Pulse Oximetry 97%, room air            REVIEW      Results Reviewed      PCCS Results Reviewed?:  Yes Prev Lab Results, Yes Prev Radiology Results, Yes     Previous Mecial Records      Lab Results      The patient's hospital discharge summary was reviewed.              [id number]  [id number]                                 [facility]                          [facility] [employer] [facility]                            [place], [place]  [address]               __________________________________________________________________________             Patient Name:                   Attending Physician:      Silvia Massey M.D.             Patient Visit # MR #            Admit Date  Disch Date     Location      [id number]    [id number]      10/20/2018                 [id number]             Date of Birth      1960      __________________________________________________________________________      835 - DIAGNOSTIC REPORT             2-D AND M-MODE ECHOCARDIOGRAM REPORT             DATE:  10/21/2018.             REASON FOR EXAMINATION:  Dyspnea.                                    NORMAL RANGE       TEST RESULTS      _____________________________________________________________________                                                   Systolic       Diastolic      RVID                    0.7-2.4      LVID                    3.7-5.4         4.4            4.9      POST. WALL THICKNESS    0.8-1.1                        1.0      SEPTAL WALL THICKNESS   0.7-1.2                        1.0      LAID                    1.9-3.8                        4.7      AORTIC ROOT DIMENSION   2.0-3.7                         2.4      MTRL. VLV. LYNNETTE. D.D.R. 80mm/sec-150mm/sec             IVC is 1.6.  E/A ratio is 2.9.      _____________________________________________________________________             COMMENTS:   The patient underwent 2-D, M-mode, and Doppler echocardiographic      examination including pulsed wave, continuous wave, and color flow Doppler      analysis.  The study was technically adequate.  The following findings were      noted.             1.  MITRAL VALVE:        Normal in appearance, opens well, no evidence of          mitral prolapse, no mitral stenosis.  There is mild mitral regurgitation.      2.  AORTIC VALVE:        Normal trileaflet aortic valve, opens well.  No          hemodynamically significant aortic stenosis or regurgitation.      3.  TRICUSPID VALVE:     Normal in appearance, opens well.  There is trace          tricuspid regurgitation.  Calculated pulmonary artery systolic pressure          is 31-36 mmHg, which is borderline elevated.      4.  PULMONIC VALVE:      Grossly normal.      5.  AORTIC ROOT:         Normal in size with adequate motion.      6.  LEFT ATRIUM:         There is severe left atrial enlargement.  No          intracavitary masses or clots seen.  Left atrial volume index is 53 mL/m2.      7.  LEFT VENTRICLE:      Left ventricular chamber size is normal.  Left          ventricular wall thickness is normal.  There is severe global hypokinesis          of the left ventricle.  Overall left ventricular ejection fraction is          30%.  There is grade 3 diastolic dysfunction, restrictive physiology of          the left ventricle.      8.  RIGHT VENTRICLE:     Normal in size and function.      9.  RIGHT ATRIUM:        Moderate right atrial enlargement.      10. PERICARDIUM:         Trace posterior pericardial effusion noted.  There          is no echocardiographic evidence of cardiac tamponade.      11. INFERIOR VENA CAVA:   Measures 1.6 cm in diameter, and there is less than           50% collapse during inspiration.             CONCLUSION:      1.  Severe global hypokinesis of the left ventricle with estimated left          ventricular ejection fraction of 30%.      2.  Grade 3 diastolic dysfunction of the left ventricle.      3.  Mild mitral regurgitation.      4.  Biatrial enlargement with severe left atrial enlargement.      5.  Borderline elevated pulmonary artery systolic pressure.             To be electronically signed in [facility]      [id number] JOANA KAUR M.D.             JV:aw      D:  10/21/2018 22:09      T:  10/21/2018 22:25      #[id number]             Until signed, this is an unconfirmed preliminary report that may contain      errors and is subject to change.                   10/24/18 1442  <Electronically signed by JOANA KAUR MD>      Radiographic Results               [facility]                PACS RADIOLOGY REPORT            Patient: SACHA COOMBS   Acct: #[id number]   Report: #[id number]            UNIT #: [id number]    DOS: 10/31/18 1112      INSURANCE:BLUE CROSS [facility] PROGRAM   ORDER #:[id number]      LOCATION:ER     : 1960            PROVIDERS      ADMITTING:     ATTENDING:       FAMILY:  FELIPE GALLEGOS   ORDERING:  NAOMI MOREL         OTHER:    DICTATING:  Michael Ang MD, IV            REQ #:[id number]   EXAM:CHWO - CT CHEST without CONTRAST      REASON FOR EXAM:  Esophageal FB sensation      REASON FOR VISIT:  POSS FB IN THROAT            *******Signed******         PROCEDURE:   CT CHEST WITHOUT CONTRAST             COMPARISON:   [facility], CT, CHEST W/ CONTRAST, 10/20/2018,     17:00.             INDICATIONS:   Esophageal FB sensation             TECHNIQUE:   CT images were created without the administration of contrast     material.               PROTOCOL:     Standard imaging protocol performed                RADIATION:     DLP: 379.3mGy*cm          Automated exposure  control was utilized to minimize radiation dose.              TECHNIQUE:   Axial images of the chest without intravenous contrast.             FINDINGS:      Pleural effusions have resolved.  There are no enlarged mediastinal, axillary or    hilar lymph nodes.        Coronary artery calcification is present.  There is atherosclerotic     calcification in the thoracic       aorta.  No suspicious pulmonary masses are identified.  No radiopaque esophageal    foreign bodies are       identified.  No evidence of pneumomediastinum.  The thyroid gland has a mildly     enlarged       heterogeneous appearance.             IMPRESSION:              1.  No evidence of radiopaque esophageal foreign body.  No evidence of     pneumomediastinum.             2.  Thyroid goiter.             3.  Coronary artery calcification.              NIDHI KEMP MD             Electronically Signed and Approved By: NIDHI KEMP MD on 10/31/2018 at     11:55                        Until signed, this is an unconfirmed preliminary report that may contain      errors and is subject to change.                                              BRYJE:      D:10/31/18 1155               [facility]                PACS RADIOLOGY REPORT            Patient: SACHA COOMBS   Acct: #[id number]   Report: #[id number]            UNIT #: [id number]    DOS: 10/23/18 1014      INSURANCE:BL[facility] CROSS [facility] PROGRAM   ORDER #:[id number]      LOCATION:[place] [address]   : 1960            PROVIDERS      ADMITTING:  Kelle Robertson   ATTENDING: Kelle Robertson      FAMILY:  NONE,MD   ORDERING:  Kavon Sanabria         OTHER:    DICTATING:  Ludwig Winston MD            REQ #:[id number]   EXAM:CXR1 - CHEST 1 View AP PA      REASON FOR EXAM:  respiratory failure, pleural effusion      REASON FOR VISIT:  SOA            *******Signed******         PROCEDURE:   CHEST AP/PA SINGLE VIEW              COMPARISON:   [facility], CR, CHEST AP/PA 1 VIEW, 10/20/2018,     14:09.             INDICATIONS:   respiratory failure, pleural effusion             FINDINGS:         There are mild bibasilar areas of subsegmental atelectasis.  There is borderline    cardiomegaly.  The       pulmonary vascularity is normal.  The lungs are otherwise clear.      CONCLUSION:   Borderline cardiomegaly.  Mild bibasilar areas of subsegmental     atelectasis may relate       to hypoventilation.              KELSEY GALICIA MD             Electronically Signed and Approved By: KELSEY GALICIA MD on 10/23/2018 at 10:53                           Until signed, this is an unconfirmed preliminary report that may contain      errors and is subject to change.                                              SCHJ1:      D:10/23/18 1053            Assessment      SOB (shortness of breath) on exertion - R06.02            Notes      New Medications      * Gabapentin 250 MG/5 ML SOLUTION: 600 MG PO BID #720      Discontinued Medications      * Lisinopril* 10 MG TABLET: 10 MG PO QDAY 30 Days #30      * Furosemide 40 MG TABLET: 40 MG PO QDAY 30 Days #30      New Diagnostics      * PFT Pre/Post Spirometry Only, Week         Dx: SOB (shortness of breath) on exertion - R06.02      * 6 Min Walk With Pulse Ox, Routine         Dx: SOB (shortness of breath) on exertion - R06.02      * Immunoglobulin  E (I, Week         Dx: SOB (shortness of breath) on exertion - R06.02      * Chest W/O Cont CT, SCHEDULED PROCEDURE         Dx: SOB (shortness of breath) on exertion - R06.02      PLAN:  The patient is a 58 year old female with possible asthma/COPD with     exertional dyspnea and has congestive heart failure.              1.  I will check pulmonary function test and six minute walk test.              2.  I will check serum IgE level and CT scan of the chest now.              3.  Echocardiogram repeat is ordered by Dr. Santana. She continues to follow       Santana for congestive heart failure.              4.  We will check alpha 1 antitrypsin level and genetics as well.            5.  Follow up with her in 3-4 months, earlier if needed.            Patient Education      Education resources provided:  Yes      Patient Education Provided:  Acute Bronchitis                 Disclaimer: Converted document may not contain table formatting or lab diagrams. Please see [facility] for the authenticated document.

## 2021-06-05 NOTE — H&P
History and Physical      Patient Name: Silvia Massey   Patient ID: [id number]   Sex: Female   YOB: 1960    Primary Care Provider: Louie Jang MD    Visit Date: May 20, 2021    Provider: Melecio Maldonado MD   Location: [facility]   Location Address: [address] [place] [address]  [id number]   Location Phone: [phone]          Chief Complaint  · Lipoma      History Of Present Illness  Silvia Massey is a 61 year old /Black female who presents to the office today as a consult from REFERRING CARE PROVIDER NAME.      History of left flank lipoma excision 7.8 cm 11/10/2017 by Dr. Osullivan.  Pathology demonstrated lipoma.  Since that time she has had pain to the site as well as she thinks a recurrence with increased size of the area.  Intermittent 10 out of 10 pain worse with certain movements.  No chest pain.  No blood thinner use.  No personal family history of soft tissue cancer.  No imaging.  No alleviating factors. No tobacco use       Past Medical History  Disease Name Date Onset Notes   Anemia --  --    Arthritis --  --    CHF (congestive heart failure) --  --    Depression --  --    Diabetes mellitus, type 2 --  --    Forgetfulness --  --    Gastroesophageal reflux disease --  --    GERD (gastroesophageal reflux disease) --  --    Hypertension --  --    Night sweats --  --    Screening for breast cancer 6/2019 normal    Sinus trouble --  --    Sleep apnea --  --    Voice hoarseness --  --          Past Surgical History  Procedure Name Date Notes   Blephroplasty 07/09/19 --    Colonoscopy 2018 [facility]   EGD 2017 --    Lypoma Removal --  --    Removal of ovary --  --          Medication List  Name Date Started Instructions   albuterol sulfate 2.5 mg /3 mL (0.083 %) inhalation solution for nebulization  2.5 mg INH RTQID   baclofen 10 mg oral tablet 08/28/2020 take 1 tablet by oral route 3 times a day prn pain   carvedilol 3.125 mg oral tablet  08/19/2020 Take 1 tablet by mouth twice daily   Dulera 200-5 mcg/actuation inhalation HFA aerosol inhaler  inhale 2 puffs INH RTBID   Entresto 49-51 mg oral tablet 05/18/2020 TAKE 1 TABLET BY MOUTH TWICE DAILY   EQ Aspirin Adult Low Dose 81 MG Oral Tablet Delayed Release 01/04/2021 Take 1 tablet by mouth once daily   Farxiga 10 mg oral tablet 05/07/2021 TAKE 1 TABLET BY MOUTH ONCE DAILY IN THE MORNING FOR 90 DAYS   gabapentin 600 mg oral tablet 01/09/2019 take 1 tablet by oral route 3 times a day prn pain   glipizide 10 mg oral tablet 12/18/2020 TAKE 1 TABLET BY MOUTH TWICE DAILY WITH MEALS   ProAir HFA 90 mcg/actuation inhalation HFA aerosol inhaler 04/05/2021 inhale 2 puffs by inhalation route for shortness of breath   rosuvastatin 10 mg oral tablet 06/22/2020 TAKE 1 TABLET BY MOUTH ONCE DAILY AT BEDTIME FOR 90 DAYS   spironolactone 25 mg oral tablet 12/18/2020 Take 1 tablet by mouth once daily   trazodone 50 mg oral tablet 12/18/2020 take 1 tablet (50 mg) by oral route once daily at bedtime for 30 days   Trulicity 3 mg/0.5 mL subcutaneous pen injector 04/05/2021 inject 0.5 milliliter (3 mg) by subcutaneous route every 7 days   Tylenol 325 mg oral tablet 12/08/2020 take 2 tablets by oral route every 6 hours as needed for 30 days   Vitamin D (Ergocalciferol) 1.25 MG (92356 UT) Oral Capsule 02/08/2021 Take 1 capsule by mouth once a week   Vitamin D2 1,250 mcg (50,000 unit) oral capsule 11/23/2020 take 1 capsule by oral route once weekly   Voltaren 1 % topical gel 12/08/2020 apply 2 grams to the affected area(s) by topical route 4 times per day for 14 days         Allergy List  Allergen Name Date Reaction Notes   NO KNOWN DRUG ALLERGIES --  --  --          Family Medical History  Disease Name Relative/Age Notes   Heart Disease Father/   --    Cancer, Unspecified Mother/   --    Diabetes Mellitus, Type II Aunt/  Brother/  Uncle/   --    Uterine Cancer, Family History  --    Family history of cancer Mother/   Mother  "  Family history of heart disease Brother/   Brother   Family history of diabetes mellitus Brother/   Brother         Social History  Finding Status Start/Stop Quantity Notes   Alcohol Current some day --/-- --  occasionally drinks, 1 drink per day, has been drinking for 6-10 years  1 beer a night    --  --/-- --  --    Tobacco Former --/-- --  former smoker  1/2 ppd   Working --  --/-- --  --          Immunizations  NameDate Admin Mfg Trade Name Lot Number Route Inj VIS Given VIS Publication   Hepatitis A01/09/2019 NE Not Entered  NE NE 01/17/2019    Comments:    Edshjlofw89/11/2020 PMC Fluzone Quadrivalent [id number]  LD 09/11/2020    Comments: pt tolerated well, left office in stable condition   Hexfblmpt2931/31/2018 MSD PNEUMOVAX 23 [id number]  RD 10/31/2018 04/24/2015   Comments: Pt tolerated well, left office in stable condition.   Acycnlon26/24/2019 SKB Other TradeName [id number] [facility] 10/24/2019    Comments: patient tolerated well, left office in stable condition         Review of Systems  · Constitutional  o Denies  o : chills, fever  · Eyes  o Denies  o : yellowish discoloration of eyes  · HENT  o Denies  o : nose bleeding, difficulty swallowing  · Cardiovascular  o Denies  o : chest pain on exertion  · Respiratory  o Denies  o : shortness of breath  · Gastrointestinal  o Denies  o : nausea, vomiting  · Genitourinary  o Denies  o : abnormal color of urine, frequency of urine  · Integument  o Admits  o : skin lesion or lump  o Denies  o : rash  · Neurologic  o Denies  o : tingling or numbness  · Musculoskeletal  o Denies  o : joint pain  · Endocrine  o Denies  o : weight gain, weight loss      Vitals  Date Time BP Position Site L\R Cuff Size HR RR TEMP (F) WT  HT  BMI kg/m2 BSA m2 O2 Sat FR L/min FiO2        05/20/2021 01:55 PM       16  200lbs 4oz 5'  3\" 35.47 2.01             Physical Examination  · Constitutional  o Appearance  o : healthy appearing, alert and in no acute distress, reliable " historian  · Head and Face  o Head  o :   § Inspection  § : no visable deformities or lesions  · Eyes  o Conjunctivae  o : clear  o Sclerae  o : clear  · Neck  o Inspection/Palpation  o : normal appearance, no masses, trachea midline  · Respiratory  o Respiratory Effort  o : breathing unlabored, respiratory effort appears normal  o Inspection of Chest  o : normal appearance, no retractions  · Cardiovascular  o Heart  o : regular rate and rhythm  · Gastrointestinal  o Abdominal Examination  o :   § Abdomen  § : soft, nontender, nondistended, No hepatosplenomegaly, positive bowel sounds  · Skin and Subcutaneous Tissue  o General Inspection  o : Incision clean dry and intact left flank without mass appreciated at the site. No significant pain with palpation. 3 x 3 cm soft mobile mass left shoulder consistent with lipoma  · Neurologic  o Cranial Nerves  o : no obvious motor deficits  o Sensation  o : no obvious sensory deficits  o Gait and Station  o :   § Gait Screening  § : normal gait, able to stand without diffculty  o Cerebellar Function  o : no obvious abnormalities  · Psychiatric  o Judgement and Insight  o : judgment and insight intact  o Mood and Affect  o : mood normal, affect appropriate          Assessment  · Left Lipoma of flank     214.1/D17.1  Questionable recurrence of left flank lipoma, pain at the site      Plan  · Orders  o US extremity (15002) - 214.1/D17.1 - 05/27/2021   At [facility] arrive at 3:30 Left Flank US  · Medications  o Medications have been Reconciled  o Transition of Care or Provider Policy  · Instructions  o Electronically Identified Patient Education Materials Provided Electronically     I had a discussion with the patient.  I reviewed her operative record and pathology.  I explained her I do not appreciate a recurrence of a lipoma at the site.  We discussed options including observation, obtaining an ultrasound of the area, referral to pain management for treatment.  She wishes to proceed  with imaging.  I will obtain an ultrasound of her left flank.  Follow-up with me afterwards.  In the meantime I encouraged her to stretch more massage the area.  All questions answered.  She agrees with the plan.  Thank you for the consult.             Electronically Signed by: Melecio Maldonado MD -Author on May 20, 2021 02:23:53 PM

## 2021-06-24 ENCOUNTER — TELEPHONE (OUTPATIENT)
Dept: INTERNAL MEDICINE | Facility: CLINIC | Age: 61
End: 2021-06-24

## 2021-06-24 RX ORDER — ASPIRIN 81 MG/1
81 TABLET ORAL DAILY
Qty: 90 TABLET | Refills: 3 | Status: SHIPPED | OUTPATIENT
Start: 2021-06-24 | End: 2022-07-11

## 2021-06-24 RX ORDER — SPIRONOLACTONE 25 MG/1
25 TABLET ORAL DAILY
Qty: 90 TABLET | Refills: 3 | Status: SHIPPED | OUTPATIENT
Start: 2021-06-24 | End: 2022-06-27

## 2021-06-24 RX ORDER — GLIPIZIDE 10 MG/1
10 TABLET ORAL
Qty: 180 TABLET | Refills: 1 | Status: SHIPPED | OUTPATIENT
Start: 2021-06-24 | End: 2021-07-27 | Stop reason: ALTCHOICE

## 2021-06-24 NOTE — TELEPHONE ENCOUNTER
PT NEEDS ASPIRIN, GLIPISIDE, AND  ALDACTONE REFILLED DUE TO [facility] THE [facility] , [place] [facility]

## 2021-07-15 VITALS — RESPIRATION RATE: 16 BRPM | BODY MASS INDEX: 35.48 KG/M2 | HEIGHT: 63 IN | WEIGHT: 200.25 LBS

## 2021-07-27 ENCOUNTER — OFFICE VISIT (OUTPATIENT)
Dept: INTERNAL MEDICINE | Facility: CLINIC | Age: 61
End: 2021-07-27

## 2021-07-27 VITALS
WEIGHT: 194 LBS | HEART RATE: 100 BPM | SYSTOLIC BLOOD PRESSURE: 123 MMHG | DIASTOLIC BLOOD PRESSURE: 82 MMHG | OXYGEN SATURATION: 97 % | TEMPERATURE: 97.9 F | BODY MASS INDEX: 33.12 KG/M2 | HEIGHT: 64 IN

## 2021-07-27 DIAGNOSIS — E61.1 IRON DEFICIENCY: ICD-10-CM

## 2021-07-27 DIAGNOSIS — E11.65 TYPE 2 DIABETES MELLITUS WITH HYPERGLYCEMIA, WITHOUT LONG-TERM CURRENT USE OF INSULIN (HCC): Primary | ICD-10-CM

## 2021-07-27 DIAGNOSIS — M19.90 ARTHRITIS: ICD-10-CM

## 2021-07-27 DIAGNOSIS — E78.5 HYPERLIPIDEMIA, UNSPECIFIED HYPERLIPIDEMIA TYPE: ICD-10-CM

## 2021-07-27 DIAGNOSIS — L29.9 PRURITUS: ICD-10-CM

## 2021-07-27 DIAGNOSIS — I10 ESSENTIAL HYPERTENSION: ICD-10-CM

## 2021-07-27 PROBLEM — D64.9 ANEMIA: Status: ACTIVE | Noted: 2021-07-27

## 2021-07-27 PROBLEM — K21.9 GASTROESOPHAGEAL REFLUX DISEASE: Status: ACTIVE | Noted: 2021-07-27

## 2021-07-27 PROBLEM — E11.9 DIABETES MELLITUS: Status: ACTIVE | Noted: 2021-07-27

## 2021-07-27 PROBLEM — F32.A DEPRESSION: Status: ACTIVE | Noted: 2021-07-27

## 2021-07-27 PROBLEM — R53.83 FATIGUE: Status: ACTIVE | Noted: 2021-07-27

## 2021-07-27 PROBLEM — I50.9 CHF (CONGESTIVE HEART FAILURE) (HCC): Status: ACTIVE | Noted: 2021-07-27

## 2021-07-27 PROBLEM — G47.30 SLEEP APNEA: Status: ACTIVE | Noted: 2021-07-27

## 2021-07-27 LAB
ALBUMIN SERPL-MCNC: 5.2 G/DL (ref 3.5–5.2)
ALBUMIN/GLOB SERPL: 2.1 G/DL
ALP SERPL-CCNC: 65 U/L (ref 39–117)
ALT SERPL W P-5'-P-CCNC: 20 U/L (ref 1–33)
ANION GAP SERPL CALCULATED.3IONS-SCNC: 11.7 MMOL/L (ref 5–15)
AST SERPL-CCNC: 18 U/L (ref 1–32)
BASOPHILS # BLD AUTO: 0.1 10*3/MM3 (ref 0–0.2)
BASOPHILS NFR BLD AUTO: 1.7 % (ref 0–1.5)
BILIRUB SERPL-MCNC: 0.2 MG/DL (ref 0–1.2)
BUN SERPL-MCNC: 18 MG/DL (ref 8–23)
BUN/CREAT SERPL: 21.7 (ref 7–25)
CALCIUM SPEC-SCNC: 10.3 MG/DL (ref 8.6–10.5)
CHLORIDE SERPL-SCNC: 107 MMOL/L (ref 98–107)
CHOLEST SERPL-MCNC: 129 MG/DL (ref 0–200)
CO2 SERPL-SCNC: 24.3 MMOL/L (ref 22–29)
CREAT SERPL-MCNC: 0.83 MG/DL (ref 0.57–1)
DEPRECATED RDW RBC AUTO: 43.2 FL (ref 37–54)
EOSINOPHIL # BLD AUTO: 0.13 10*3/MM3 (ref 0–0.4)
EOSINOPHIL NFR BLD AUTO: 2.2 % (ref 0.3–6.2)
ERYTHROCYTE [DISTWIDTH] IN BLOOD BY AUTOMATED COUNT: 13.8 % (ref 12.3–15.4)
FERRITIN SERPL-MCNC: 139 NG/ML (ref 13–150)
GFR SERPL CREATININE-BSD FRML MDRD: 85 ML/MIN/1.73
GLOBULIN UR ELPH-MCNC: 2.5 GM/DL
GLUCOSE SERPL-MCNC: 69 MG/DL (ref 65–99)
HBA1C MFR BLD: 6.2 % (ref 4.8–5.6)
HCT VFR BLD AUTO: 46.6 % (ref 34–46.6)
HDLC SERPL-MCNC: 58 MG/DL (ref 40–60)
HGB BLD-MCNC: 14.8 G/DL (ref 12–15.9)
IMM GRANULOCYTES # BLD AUTO: 0.01 10*3/MM3 (ref 0–0.05)
IMM GRANULOCYTES NFR BLD AUTO: 0.2 % (ref 0–0.5)
IRON 24H UR-MRATE: 57 MCG/DL (ref 37–145)
IRON SATN MFR SERPL: 14 % (ref 20–50)
LDLC SERPL CALC-MCNC: 53 MG/DL (ref 0–100)
LDLC/HDLC SERPL: 0.88 {RATIO}
LYMPHOCYTES # BLD AUTO: 2.34 10*3/MM3 (ref 0.7–3.1)
LYMPHOCYTES NFR BLD AUTO: 39.1 % (ref 19.6–45.3)
MCH RBC QN AUTO: 27.2 PG (ref 26.6–33)
MCHC RBC AUTO-ENTMCNC: 31.8 G/DL (ref 31.5–35.7)
MCV RBC AUTO: 85.5 FL (ref 79–97)
MONOCYTES # BLD AUTO: 0.4 10*3/MM3 (ref 0.1–0.9)
MONOCYTES NFR BLD AUTO: 6.7 % (ref 5–12)
NEUTROPHILS NFR BLD AUTO: 3 10*3/MM3 (ref 1.7–7)
NEUTROPHILS NFR BLD AUTO: 50.1 % (ref 42.7–76)
NRBC BLD AUTO-RTO: 0 /100 WBC (ref 0–0.2)
PLATELET # BLD AUTO: 250 10*3/MM3 (ref 140–450)
PMV BLD AUTO: 11.1 FL (ref 6–12)
POTASSIUM SERPL-SCNC: 4.1 MMOL/L (ref 3.5–5.2)
PROT SERPL-MCNC: 7.7 G/DL (ref 6–8.5)
RBC # BLD AUTO: 5.45 10*6/MM3 (ref 3.77–5.28)
SODIUM SERPL-SCNC: 143 MMOL/L (ref 136–145)
TIBC SERPL-MCNC: 416 MCG/DL (ref 298–536)
TRANSFERRIN SERPL-MCNC: 279 MG/DL (ref 200–360)
TRIGL SERPL-MCNC: 99 MG/DL (ref 0–150)
VLDLC SERPL-MCNC: 18 MG/DL (ref 5–40)
WBC # BLD AUTO: 5.98 10*3/MM3 (ref 3.4–10.8)

## 2021-07-27 PROCEDURE — 83036 HEMOGLOBIN GLYCOSYLATED A1C: CPT | Performed by: INTERNAL MEDICINE

## 2021-07-27 PROCEDURE — 85025 COMPLETE CBC W/AUTO DIFF WBC: CPT | Performed by: INTERNAL MEDICINE

## 2021-07-27 PROCEDURE — 80061 LIPID PANEL: CPT | Performed by: INTERNAL MEDICINE

## 2021-07-27 PROCEDURE — 83540 ASSAY OF IRON: CPT | Performed by: INTERNAL MEDICINE

## 2021-07-27 PROCEDURE — 80053 COMPREHEN METABOLIC PANEL: CPT | Performed by: INTERNAL MEDICINE

## 2021-07-27 PROCEDURE — 84466 ASSAY OF TRANSFERRIN: CPT | Performed by: INTERNAL MEDICINE

## 2021-07-27 PROCEDURE — 99214 OFFICE O/P EST MOD 30 MIN: CPT | Performed by: INTERNAL MEDICINE

## 2021-07-27 PROCEDURE — 82728 ASSAY OF FERRITIN: CPT | Performed by: INTERNAL MEDICINE

## 2021-07-27 RX ORDER — HYDROCHLOROTHIAZIDE 25 MG/1
TABLET ORAL
COMMUNITY
End: 2021-07-27

## 2021-07-27 RX ORDER — GABAPENTIN 600 MG/1
600 TABLET ORAL NIGHTLY PRN
Qty: 90 TABLET | Refills: 0 | Status: SHIPPED | OUTPATIENT
Start: 2021-07-27 | End: 2023-03-17 | Stop reason: SDUPTHER

## 2021-07-27 RX ORDER — GABAPENTIN 600 MG/1
600 TABLET ORAL 3 TIMES DAILY
COMMUNITY
End: 2021-07-27 | Stop reason: SDUPTHER

## 2021-07-27 RX ORDER — CARVEDILOL 6.25 MG/1
6.25 TABLET ORAL 2 TIMES DAILY
COMMUNITY
Start: 2021-07-12 | End: 2021-11-23 | Stop reason: SDUPTHER

## 2021-07-27 RX ORDER — DULAGLUTIDE 1.5 MG/.5ML
INJECTION, SOLUTION SUBCUTANEOUS
COMMUNITY
End: 2021-12-07

## 2021-07-27 RX ORDER — LISINOPRIL 10 MG/1
TABLET ORAL
COMMUNITY
End: 2021-07-27

## 2021-07-27 RX ORDER — FUROSEMIDE 40 MG/1
TABLET ORAL
COMMUNITY
End: 2022-04-04

## 2021-07-27 RX ORDER — ROSUVASTATIN CALCIUM 10 MG/1
10 TABLET, COATED ORAL
COMMUNITY
Start: 2021-05-01 | End: 2021-08-11 | Stop reason: SDUPTHER

## 2021-07-27 RX ORDER — SACUBITRIL AND VALSARTAN 49; 51 MG/1; MG/1
1 TABLET, FILM COATED ORAL 2 TIMES DAILY
COMMUNITY
Start: 2021-06-30 | End: 2021-10-12

## 2021-07-27 RX ORDER — FENOPROFEN CALCIUM 200 MG
CAPSULE ORAL 2 TIMES DAILY
Qty: 118 ML | Refills: 1 | Status: SHIPPED | OUTPATIENT
Start: 2021-07-27 | End: 2022-04-04

## 2021-07-27 RX ORDER — DAPAGLIFLOZIN 10 MG/1
TABLET, FILM COATED ORAL
COMMUNITY
Start: 2021-05-05 | End: 2022-05-08

## 2021-07-27 NOTE — PROGRESS NOTES
"Chief Complaint  Follow-up and lab results    Subjective          Silvia Massey presents to [facility]  History of Present Illness  HTN- pt denies HAs, dizziness, CP  DM2- pt reports doing well overall with BG approx .   HLD- doing well on statin  CHF with EF=30%. Pt is on coreg, entresto, and aldactone.   Pt noted to have seroma in left flank.   Pt also reports having pruritus from mosquito bites.       Objective   Vital Signs:   /82 (BP Location: Left arm, Patient Position: Sitting, Cuff Size: Adult)   Pulse 100   Temp 97.9 °F (36.6 °C) (Temporal)   Ht 162.6 cm (64\")   Wt 88 kg (194 lb)   SpO2 97%   BMI 33.30 kg/m²     Physical Exam   Appearance: No acute distress, well-nourished  Head: normocephalic, atraumatic  Eyes: extraocular movements intact, no scleral icterus, no conjunctival injection  Ears, Nose, and Throat: external ears normal, nares patent, moist mucous membranes  Cardiovascular: regular rate and rhythm. no murmurs, rales, or rhonchi. no edema  Respiratory: breathing comfortably, symmetric chest rise, clear to auscultation bilaterally. No wheezes, rales, or rhonchi.  Neuro: alert and oriented to time, place, and person. Normal gait  Psych: normal mood and affect   Result Review :   The following data was reviewed by: Louie Jang Jr, MD on 07/27/2021:  Common labs    Common Labsle 12/17/20 12/17/20 4/5/21 4/5/21 5/11/21 5/11/21    1939 1939 1623 1623 0725 0725   Glucose  221 (A)  160 (A)  116 (A)   BUN  17  13  12   Creatinine  0.86  0.84  0.85   Sodium  142  139  140   Potassium  3.9  4.6  4.0   Chloride  104  101  104   Calcium  10.0  10.1  9.8   Albumin  4.8  4.8     Total Bilirubin  0.26  0.33     Alkaline Phosphatase  73  82     AST (SGOT)  25  22     ALT (SGPT)  31  31     WBC 5.84  5.72  4.79 (A)    Hemoglobin 14.1  14.4  14.4    Hematocrit 43.4  45.4  44.6    Platelets 232  235  228    Total Cholesterol  145  123  "    Triglycerides  225 (A)  83     HDL Cholesterol  64 (A)  62 (A)     LDL Cholesterol   36 (A)  44 (A)     Hemoglobin A1C  8.3 (A)  7.1 (A)     (A) Abnormal value       Comments are available for some flowsheets but are not being displayed.              Assessment and Plan    Diagnoses and all orders for this visit:    1. Type 2 diabetes mellitus with hyperglycemia, without long-term current use of insulin (CMS/[facility]) (Primary)  -     CBC & Differential  -     Hemoglobin A1c    2. Arthritis  -     gabapentin (NEURONTIN) 600 MG tablet; Take 1 tablet by mouth At Night As Needed (pain).  Dispense: 90 tablet; Refill: 0    3. Pruritus  -     hydrocortisone 1 % lotion; Apply  topically to the appropriate area as directed 2 (Two) Times a Day.  Dispense: 118 mL; Refill: 1    4. Essential hypertension  -     CBC & Differential  -     Hemoglobin A1c    5. Hyperlipidemia, unspecified hyperlipidemia type  -     Lipid Panel  -     Comprehensive Metabolic Panel    6. Iron deficiency  -     Ferritin  -     Iron Profile        Follow Up   Return in about 6 months (around 1/27/2022) for Recheck.

## 2021-08-02 ENCOUNTER — OFFICE VISIT (OUTPATIENT)
Dept: PLASTIC SURGERY | Facility: CLINIC | Age: 61
End: 2021-08-02

## 2021-08-02 VITALS
HEART RATE: 84 BPM | SYSTOLIC BLOOD PRESSURE: 137 MMHG | TEMPERATURE: 96.3 F | DIASTOLIC BLOOD PRESSURE: 82 MMHG | OXYGEN SATURATION: 97 %

## 2021-08-02 DIAGNOSIS — H02.839 DERMATOCHALASIS OF EYELID: Primary | ICD-10-CM

## 2021-08-02 PROCEDURE — 99212 OFFICE O/P EST SF 10 MIN: CPT | Performed by: NURSE PRACTITIONER

## 2021-08-02 RX ORDER — ERGOCALCIFEROL 1.25 MG/1
50000 CAPSULE ORAL WEEKLY
COMMUNITY
Start: 2021-07-28 | End: 2021-08-11 | Stop reason: SDUPTHER

## 2021-08-02 NOTE — PROGRESS NOTES
Chief Complaint  Follow-up (Bilateral Upper Blepharoplasty 7/9/19)    Subjective          History of Present Illness  Silvia Massey is a 61 y.o. female who presents to [facility] for Postoperative Follow-Up of bilateral upper blepharoplasty 7/9/19.  States that she feels like her upper eyelids are bigger.  Noted this about 6 months ago.  Admits to gaining weight since procedure. Excess skin maybe be worse than before procedure.     Allergies: Metformin  Allergies Reconciled.    Review of Systems     Objective     /82 (BP Location: Left arm, Patient Position: Sitting)   Pulse 84   Temp 96.3 °F (35.7 °C)   SpO2 97%     There is no height or weight on file to calculate BMI.    Physical Exam   Cardiovascular: Normal rate.     Pulmonary/Chest  Effort normal.     Face: Incision healed well, no swelling, no erythema, no drainage, good contour, moderate dermatochalasis of upper eyelids, brow and lid in good position    Result Review :                Assessment and Plan      Diagnoses and all orders for this visit:    1. Dermatochalasis of eyelid (Primary)        Plan:  • Discussed may need revision since excess skin has returned with weight gain. She feels it interferes with peripheral vision. Referred to Dr. Sands.         Follow Up     Return as needed.    Patient was given instructions and counseling regarding her condition. Please see specific information pulled into the AVS if appropriate.     Tari Ortiz, GABRIELLA  08/02/2021

## 2021-08-11 RX ORDER — ERGOCALCIFEROL 1.25 MG/1
50000 CAPSULE ORAL WEEKLY
Qty: 5 CAPSULE | Refills: 3 | Status: SHIPPED | OUTPATIENT
Start: 2021-08-11 | End: 2022-01-03

## 2021-08-11 RX ORDER — ROSUVASTATIN CALCIUM 10 MG/1
10 TABLET, COATED ORAL
Qty: 90 TABLET | Refills: 2 | Status: SHIPPED | OUTPATIENT
Start: 2021-08-11 | End: 2022-02-21

## 2021-08-11 NOTE — TELEPHONE ENCOUNTER
Caller: Bryson Silvia A    Relationship: Self    Best call back number: [phone]    Medication needed:   Requested Prescriptions     Pending Prescriptions Disp Refills   • vitamin D (ERGOCALCIFEROL) 1.25 MG (68220 UT) capsule capsule 5 capsule      Sig: Take 1 capsule by mouth 1 (One) Time Per Week.   • rosuvastatin (CRESTOR) 10 MG tablet       Sig: Take 1 tablet by mouth every night at bedtime.       When do you need the refill by: ASAP     What additional details did the patient provide when requesting the medication: OUT OF MEDICATION     Does the patient have less than a 3 day supply:  [x] Yes  [] No    What is the patient's preferred pharmacy: [facility] [address] [place] [address] [phone] [facility] [phone]

## 2021-10-06 ENCOUNTER — FLU SHOT (OUTPATIENT)
Dept: INTERNAL MEDICINE | Facility: CLINIC | Age: 61
End: 2021-10-06

## 2021-10-06 DIAGNOSIS — Z23 NEED FOR INFLUENZA VACCINATION: Primary | ICD-10-CM

## 2021-10-06 PROCEDURE — 90686 IIV4 VACC NO PRSV 0.5 ML IM: CPT | Performed by: INTERNAL MEDICINE

## 2021-10-06 PROCEDURE — 90471 IMMUNIZATION ADMIN: CPT | Performed by: INTERNAL MEDICINE

## 2021-10-12 RX ORDER — SACUBITRIL AND VALSARTAN 49; 51 MG/1; MG/1
TABLET, FILM COATED ORAL
Qty: 180 TABLET | Refills: 1 | Status: SHIPPED | OUTPATIENT
Start: 2021-10-12 | End: 2022-05-11 | Stop reason: SDUPTHER

## 2021-11-18 ENCOUNTER — OFFICE VISIT (OUTPATIENT)
Dept: PODIATRY | Facility: CLINIC | Age: 61
End: 2021-11-18

## 2021-11-18 VITALS
HEART RATE: 88 BPM | TEMPERATURE: 97.3 F | HEIGHT: 64 IN | BODY MASS INDEX: 33.12 KG/M2 | OXYGEN SATURATION: 98 % | WEIGHT: 194 LBS | SYSTOLIC BLOOD PRESSURE: 129 MMHG | DIASTOLIC BLOOD PRESSURE: 62 MMHG

## 2021-11-18 DIAGNOSIS — E11.8 DIABETIC FOOT (HCC): ICD-10-CM

## 2021-11-18 DIAGNOSIS — M20.12 VALGUS DEFORMITY OF BOTH GREAT TOES: ICD-10-CM

## 2021-11-18 DIAGNOSIS — E11.9 NON-INSULIN DEPENDENT TYPE 2 DIABETES MELLITUS (HCC): Primary | ICD-10-CM

## 2021-11-18 DIAGNOSIS — M20.11 VALGUS DEFORMITY OF BOTH GREAT TOES: ICD-10-CM

## 2021-11-18 PROCEDURE — 99203 OFFICE O/P NEW LOW 30 MIN: CPT | Performed by: PODIATRIST

## 2021-11-18 NOTE — PROGRESS NOTES
[facility] - PODIATRY    Today's Date: 11/18/21    Patient Name: Silvia Massey  MRN: [id number]  CSN: [id number]  PCP: Louie Jnag Jr., MD  Referring Provider: No ref. provider found    SUBJECTIVE     Chief Complaint   Patient presents with   • Left Foot - Bunions     Heel pain   • Right Foot - Pain, Bunions     Heel pain     HPI: Silvia Massey, a 61 y.o.female, presents to clinic for a diabetic foot evaluation.    New, Established, New Problem: New    Location:      Duration:      Onset: Insidious    Nature: NIDDM    Stable, worsening, improving: Stable    Patient controlling diabetes via: Oral medications    Patient denies any fevers, chills, nausea, vomiting, shortness of breath, nor any other constitutional signs nor symptoms.    No other pedal complaints at this time.    Last PCP visit: 27 July 2021    Past Medical History:   Diagnosis Date   • Anemia    • Arthritis    • CHF (congestive heart failure) (HCC)    • Depression    • Diabetes mellitus (HCC)     type 2   • Dyspnea    • Forgetfulness    • GERD (gastroesophageal reflux disease)    • History of transfusion    • Hypertension    • Night sweats    • Pneumonia    • Sinus trouble    • Sleep apnea    • SOB (shortness of breath)    • Voice hoarseness      Past Surgical History:   Procedure Laterality Date   • BRONCHOSCOPY N/A 10/18/2018    Procedure: BRONCHOSCOPY WITH BIOPSIES, BAL;  Surgeon: Rayray Black MD;  Location: [facility];  Service: Pulmonary   • COLONOSCOPY  2018    wnl    • ENDOSCOPY  2017   • EYE SURGERY  07/09/2019    blepharoplasty   • LYMPHANGIOMA EXCISION     • OOPHORECTOMY     • SINUS SURGERY     • TUBAL ABDOMINAL LIGATION       Family History   Problem Relation Age of Onset   • Cancer Mother    • Heart disease Father    • Diabetes Brother    • Heart disease Brother    • Diabetes Other    • Diabetes Other    • Uterine cancer Other      Social History     Socioeconomic History   • Marital  status:    Tobacco Use   • Smoking status: Former Smoker     Packs/day: 0.50     Types: Cigarettes   • Smokeless tobacco: Former User   • Tobacco comment: 25 years ago    Vaping Use   • Vaping Use: Never used   Substance and Sexual Activity   • Alcohol use: Yes     Alcohol/week: 1.0 standard drink     Types: 1 Cans of beer per week     Comment: occasionally drinks, 1 drink per day, has been drinking for 6-10 yrs,1 beer per night    • Drug use: No   • Sexual activity: Defer     Allergies   Allergen Reactions   • Metformin Unknown - Low Severity     Current Outpatient Medications   Medication Sig Dispense Refill   • albuterol (PROVENTIL) (2.5 MG/3ML) 0.083% nebulizer solution Take 2.5 mg by nebulization 4 (Four) Times a Day As Needed for Wheezing. 25 vial 5   • aspirin (aspirin) 81 MG EC tablet Take 1 tablet by mouth Daily. 90 tablet 3   • baclofen (LIORESAL) 10 MG tablet Take 10 mg by mouth 3 (Three) Times a Day.     • carvedilol (COREG) 6.25 MG tablet Take 6.25 mg by mouth 2 (Two) Times a Day.     • Dulaglutide (Trulicity) 1.5 MG/0.5ML solution pen-injector Inject  under the skin into the appropriate area as directed.     • Entresto 49-51 MG tablet Take 1 tablet by mouth twice daily 180 tablet 1   • Farxiga 10 MG tablet TAKE 1 TABLET BY MOUTH ONCE DAILY IN THE MORNING FOR 90 DAYS     • gabapentin (NEURONTIN) 600 MG tablet Take 1 tablet by mouth At Night As Needed (pain). 90 tablet 0   • hydrocortisone 1 % lotion Apply  topically to the appropriate area as directed 2 (Two) Times a Day. 118 mL 1   • mometasone-formoterol (DULERA 100) 100-5 MCG/ACT inhaler Inhale 2 puffs 2 (Two) Times a Day.     • rosuvastatin (CRESTOR) 10 MG tablet Take 1 tablet by mouth every night at bedtime. 90 tablet 2   • spironolactone (Aldactone) 25 MG tablet Take 1 tablet by mouth Daily. 90 tablet 3   • vitamin D (ERGOCALCIFEROL) 1.25 MG (58322 UT) capsule capsule Take 1 capsule by mouth 1 (One) Time Per Week. 5 capsule 3   • ferrous  sulfate 325 (65 FE) MG tablet Take 325 mg by mouth Daily With Breakfast.     • furosemide (LASIX) 40 MG tablet furosemide 40 mg oral tablet take 1 tablet (40 mg) by oral route once daily   Suspended     • pantoprazole (PROTONIX) 40 MG EC tablet Take 1 tablet by mouth Daily. 30 tablet 2     No current facility-administered medications for this visit.     Review of Systems   Constitutional: Negative.    All other systems reviewed and are negative.      OBJECTIVE     Vitals:    11/18/21 1528   BP: 129/62   Pulse: 88   Temp: 97.3 °F (36.3 °C)   SpO2: 98%       Body mass index is 33.83 kg/m².    Lab Results   Component Value Date    HGBA1C 6.20 (H) 07/27/2021       Lab Results   Component Value Date    GLUCOSE 69 07/27/2021    CALCIUM 10.3 07/27/2021     07/27/2021    K 4.1 07/27/2021    CO2 24.3 07/27/2021     07/27/2021    BUN 18 07/27/2021    CREATININE 0.83 07/27/2021    EGFRIFAFRI 85 07/27/2021    BCR 21.7 07/27/2021    ANIONGAP 11.7 07/27/2021       Patient seen in no apparent distress.      PHYSICAL EXAM:     Foot/Ankle Exam:       General:   Appearance: appears stated age and healthy    Orientation: AAOx3    Affect: appropriate    Gait: unimpaired    Shoe Gear:  Casual shoes    VASCULAR      Right Foot Vascularity   Normal vascular exam    Dorsalis pedis:  2+  Posterior tibial:  2+  Skin Temperature: warm    Edema Grading:  None  CFT:  < 3 seconds  Pedal Hair Growth:  Present  Varicosities: none       Left Foot Vascularity   Normal vascular exam    Dorsalis pedis:  2+  Posterior tibial:  2+  Skin Temperature: warm    Edema Grading:  None  CFT:  < 3 seconds  Pedal Hair Growth:  Present  Varicosities: none        NEUROLOGIC     Right Foot Neurologic   Normal sensation    Light touch sensation:  Normal  Vibratory sensation:  Normal  Hot/Cold sensation: normal    Protective Sensation using [place] Monofilament:  10     Left Foot Neurologic   Normal sensation    Light touch sensation:   Normal  Vibratory sensation:  Normal  Hot/cold sensation: normal    Protective Sensation using [place]-Eloy Monofilament:  10     MUSCULOSKELETAL      Right Foot Musculoskeletal   Hallux valgus: Yes       Left Foot Musculoskeletal   Hallux valgus: Yes       MUSCLE STRENGTH     Right Foot Muscle Strength   Normal strength    Foot dorsiflexion:  5  Foot plantar flexion:  5  Foot inversion:  5  Foot eversion:  5     Left Foot Muscle Strength   Foot dorsiflexion:  5  Foot plantar flexion:  5  Foot inversion:  5  Foot eversion:  5     RANGE OF MOTION      Right Foot Range of Motion   Foot and ankle ROM within normal limits       Left Foot Range of Motion   Foot and ankle ROM within normal limits       DERMATOLOGIC     Right Foot Dermatologic   Skin: skin intact    Nails: normal       Left Foot Dermatologic   Skin: skin intact    Nails: normal        Diabetic Foot Exam Performed      ASSESSMENT/PLAN     Diagnoses and all orders for this visit:    1. Non-insulin dependent type 2 diabetes mellitus (HCC) (Primary)    2. Diabetic foot (HCC)    3. Valgus deformity of both great toes        Comprehensive lower extremity examination and evaluation was performed.    Discussed findings and treatment plan including risks, benefits, and treatment options with patient in detail.     Treatment options discussed were :  -Morning and night checking of her feet   -Not walking around barefoot or sock foot   -Avoiding extended exposure to heat or cold     Patient agreed with treatment plan.    Diabetic foot exam performed and documented this date, compliant with CQM required standards. Detail of findings as noted in physical exam.  Lower extremity Neurologic exam for diabetic patient performed and documented this date, compliant with PQRS required standards. Detail of findings as noted in physical exam.  Advised patient importance of good routine lower extremity hygiene. Advised patient importance of evaluating for intact skin and pain  free nail borders.  Advised patient to use mirror to evaluate plantar/ soles of feet for better visualization. Advised patient monitor and phone office to be seen if any cracking to skin, open lesions, painful nail borders or if nails become elongated prior to next visit. Advised patient importance of daily cleansing of lower extremities, followed by good skin cream to maintain normal hydration of skin. Also advised patient importance of close daily monitoring of blood sugar. Advised to regulate diet and medications to maintain control of blood sugar in optimal range. Contact primary care provider if difficulties maintaining blood sugar levels.  Advised Patient of presence of Diabetes Mellitus condition.  Advised Patient risk of progression and worsening or improvement, then return of condition.  Will monitor condition for any change in future. Treat with most appropriate treatment pending status of condition.  Counseled and advised patient extensively on nature and ramifications of diabetes. Standard instructions given to patient for good diabetic foot care and maintenance. Advised importance of careful monitoring to avoid break down and complications secondary to diabetes. Advised patient importance of strict maintenance of blood sugar control. Advised patient of possible ominous results from neglect of condition, i.e.: amputation/ loss of digits, feet and legs, or even death.  Patient states understands counseling, will monitor closely, continue good hygiene and routine diabetic foot care. Patient will contact office is questions or problems.      An After Visit Summary was printed and given to the patient at discharge, including (if requested) any available informative/educational handouts regarding diagnosis, treatment, or medications. All questions were answered to patient/family satisfaction. Should symptoms fail to improve or worsen they agree to call or return to clinic or to go to the Emergency Department.  Discussed the importance of following up with any needed screening tests/labs/specialist appointments and any requested follow-up recommended by me today. Importance of maintaining follow-up discussed and patient accepts that missed appointments can delay diagnosis and potentially lead to worsening of conditions.    Return in about 1 year (around 11/18/2022) for Podiatry Diabetic Foot Exam., or sooner if acute issues arise.    This document has been electronically signed by Angel Fabian DPM on November 18, 2021 16:19 EST

## 2021-11-23 RX ORDER — CARVEDILOL 6.25 MG/1
TABLET ORAL
Qty: 60 TABLET | Refills: 0 | Status: SHIPPED | OUTPATIENT
Start: 2021-11-23 | End: 2021-12-22

## 2021-11-23 NOTE — TELEPHONE ENCOUNTER
Patient last OV 04.14.21     Medication was documented at that visit as 3.125 MG but there is no documentation of the increase.       Next OV  None scheduled     Patient stated that she is also needs samples of Entresto.    Please advise.

## 2021-12-07 RX ORDER — DULAGLUTIDE 1.5 MG/.5ML
INJECTION, SOLUTION SUBCUTANEOUS
Qty: 13 PEN | Refills: 3 | Status: SHIPPED | OUTPATIENT
Start: 2021-12-07 | End: 2022-01-28 | Stop reason: ALTCHOICE

## 2021-12-20 NOTE — TELEPHONE ENCOUNTER
Patient last OV 04.14.21      Medication was documented at that visit as 3.125 MG.       Next OV  None scheduled

## 2021-12-21 ENCOUNTER — HOSPITAL ENCOUNTER (OUTPATIENT)
Dept: GENERAL RADIOLOGY | Facility: HOSPITAL | Age: 61
Discharge: HOME | End: 2021-12-21
Admitting: NURSE PRACTITIONER

## 2021-12-21 ENCOUNTER — OFFICE VISIT (OUTPATIENT)
Dept: INTERNAL MEDICINE | Facility: CLINIC | Age: 61
End: 2021-12-21

## 2021-12-21 VITALS
DIASTOLIC BLOOD PRESSURE: 83 MMHG | SYSTOLIC BLOOD PRESSURE: 137 MMHG | OXYGEN SATURATION: 97 % | TEMPERATURE: 96.9 F | BODY MASS INDEX: 33.93 KG/M2 | WEIGHT: 194.6 LBS | HEART RATE: 83 BPM

## 2021-12-21 DIAGNOSIS — S99.921A INJURY OF TOE ON RIGHT FOOT, INITIAL ENCOUNTER: ICD-10-CM

## 2021-12-21 DIAGNOSIS — S99.921A INJURY OF TOE ON RIGHT FOOT, INITIAL ENCOUNTER: Primary | ICD-10-CM

## 2021-12-21 PROCEDURE — 73630 X-RAY EXAM OF FOOT: CPT

## 2021-12-21 PROCEDURE — 99213 OFFICE O/P EST LOW 20 MIN: CPT | Performed by: NURSE PRACTITIONER

## 2021-12-21 NOTE — PROGRESS NOTES
Chief Complaint  Toe Pain (right pinky toe pain- hit on bed frame Sunday )    Subjective          Silvia Massey presents to [facility]  History of Present Illness    61-year-old female patient presents to the clinic today complaining of right pinky toe pain and swelling.  Patient states that on Sunday she hit the corner of her bed causing hyperextension of the toe.  Patient states that she has had increased pain and swelling over the last 24 hours.  Patient states that pain is worse with range of motion and ambulation.  Patient has not tried any over-the-counter anti-inflammatory or pain medications.  Denies numbness, tingling, fever.  Current Outpatient Medications   Medication Instructions   • albuterol (PROVENTIL) 2.5 mg, Nebulization, 4 Times Daily PRN   • aspirin 81 mg, Oral, Daily   • baclofen (LIORESAL) 10 mg, Oral, 3 Times Daily   • carvedilol (COREG) 6.25 MG tablet Take 1 tablet by mouth twice daily   • Entresto 49-51 MG tablet Take 1 tablet by mouth twice daily   • Farxiga 10 MG tablet TAKE 1 TABLET BY MOUTH ONCE DAILY IN THE MORNING FOR 90 DAYS   • ferrous sulfate 325 mg, Oral, Daily With Breakfast   • furosemide (LASIX) 40 MG tablet furosemide 40 mg oral tablet take 1 tablet (40 mg) by oral route once daily   Suspended   • gabapentin (NEURONTIN) 600 mg, Oral, Nightly PRN   • hydrocortisone 1 % lotion Topical, 2 Times Daily   • mometasone-formoterol (DULERA 100) 100-5 MCG/ACT inhaler 2 puffs, Inhalation, 2 Times Daily - RT   • pantoprazole (PROTONIX) 40 mg, Oral, Daily   • rosuvastatin (CRESTOR) 10 mg, Oral, Every Night at Bedtime   • spironolactone (ALDACTONE) 25 mg, Oral, Daily   • Trulicity 1.5 MG/0.5ML solution pen-injector INJECT 0.5 ML SUB-Q EVERY 7 DAYS IN THE ABDOMEN, THIGH OR UPPER ARM ROTATING INJECTION SITES   • vitamin D (ERGOCALCIFEROL) 50,000 Units, Oral, Weekly       The following portions of the patient's history were reviewed and updated  as appropriate: allergies, current medications, past family history, past medical history, past social history, past surgical history, and problem list.    Objective   Vital Signs:   /83 (BP Location: Left arm, Patient Position: Sitting, Cuff Size: Adult)   Pulse 83   Temp 96.9 °F (36.1 °C) (Temporal)   Wt 88.3 kg (194 lb 9.6 oz)   SpO2 97%   BMI 33.93 kg/m²     Wt Readings from Last 3 Encounters:   12/21/21 88.3 kg (194 lb 9.6 oz)   11/18/21 88 kg (194 lb)   07/27/21 88 kg (194 lb)     BP Readings from Last 3 Encounters:   12/21/21 137/83   11/18/21 129/62   08/02/21 137/82     Physical Exam  Vitals and nursing note reviewed.   Constitutional:       Appearance: Normal appearance.   Pulmonary:      Effort: Pulmonary effort is normal.   Musculoskeletal:         General: Tenderness and signs of injury present. No deformity.      Right lower leg: No edema.      Left lower leg: No edema.   Skin:     General: Skin is warm and dry.      Capillary Refill: Capillary refill takes less than 2 seconds.      Coloration: Skin is not pale.      Findings: Erythema present. No bruising.      Comments: Erythema and edema noted to right 5th digit.   Neurovascular status intact.   ROM decreased related to pain.    Neurological:      Mental Status: She is alert and oriented to person, place, and time. Mental status is at baseline.   Psychiatric:         Mood and Affect: Mood normal.         Behavior: Behavior normal.         Thought Content: Thought content normal.         Judgment: Judgment normal.            Result Review :   The following data was reviewed by: GABRIELLA Cornelius on 12/21/2021:  Common labs    Common Labsle 4/5/21 4/5/21 5/11/21 5/11/21 7/27/21 7/27/21 7/27/21 7/27/21    1623 1623 0725 0725 1719 1719 1719 1719   Glucose  160 (A)  116 (A)    69   BUN  13  12    18   Creatinine  0.84  0.85    0.83   eGFR African Am        85   Sodium  139  140    143   Potassium  4.6  4.0    4.1   Chloride  101  104     107   Calcium  10.1  9.8    10.3   Albumin  4.8      5.20   Total Bilirubin  0.33      0.2   Alkaline Phosphatase  82      65   AST (SGOT)  22      18   ALT (SGPT)  31      20   WBC 5.72  4.79 (A)  5.98      Hemoglobin 14.4  14.4  14.8      Hematocrit 45.4  44.6  46.6      Platelets 235  228  250      Total Cholesterol       129    Total Cholesterol  123         Triglycerides  83     99    HDL Cholesterol  62 (A)     58    LDL Cholesterol   44 (A)     53    Hemoglobin A1C  7.1 (A)    6.20 (A)     (A) Abnormal value       Comments are available for some flowsheets but are not being displayed.                Lab Results   Component Value Date    INR 0.99 (L) 05/11/2021       Procedures        Assessment and Plan    Diagnoses and all orders for this visit:    1. Injury of toe on right foot, initial encounter (Primary)  Comments:  x-ray ordered; post-op shoe provided; elevate while sitting; ice to the area; tylenol/ibuprofen OTC for pain control.   Orders:  -     XR Foot 3+ View Right; Future  -     Post-Op Shoe        There are no discontinued medications.     Follow Up   Return if symptoms worsen or fail to improve.  Patient was given instructions and counseling regarding her condition or for health maintenance advice. Please see specific information pulled into the AVS if appropriate.

## 2021-12-22 ENCOUNTER — TELEPHONE (OUTPATIENT)
Dept: INTERNAL MEDICINE | Facility: CLINIC | Age: 61
End: 2021-12-22

## 2021-12-22 RX ORDER — CARVEDILOL 6.25 MG/1
TABLET ORAL
Qty: 60 TABLET | Refills: 0 | Status: SHIPPED | OUTPATIENT
Start: 2021-12-22 | End: 2022-01-28

## 2021-12-22 NOTE — TELEPHONE ENCOUNTER
----- Message from GABRIELLA Cornelius sent at 12/21/2021  4:27 PM EST -----  Soft tissue injury noted on x-ray.  No fracture or dislocation within the toe or the foot.  Patient may continue to wear the postop shoe for pain control.  May bear weight as tolerated.  Continue elevation of the foot and apply ice several times per day to help with swelling.  As discussed Tylenol or ibuprofen over-the-counter for pain control

## 2022-01-03 RX ORDER — ERGOCALCIFEROL 1.25 MG/1
CAPSULE ORAL
Qty: 13 CAPSULE | Refills: 1 | Status: SHIPPED | OUTPATIENT
Start: 2022-01-03 | End: 2022-07-11

## 2022-01-27 ENCOUNTER — OFFICE VISIT (OUTPATIENT)
Dept: INTERNAL MEDICINE | Facility: CLINIC | Age: 62
End: 2022-01-27

## 2022-01-27 VITALS
TEMPERATURE: 97.3 F | HEART RATE: 73 BPM | BODY MASS INDEX: 33.83 KG/M2 | SYSTOLIC BLOOD PRESSURE: 146 MMHG | WEIGHT: 198.13 LBS | DIASTOLIC BLOOD PRESSURE: 91 MMHG | OXYGEN SATURATION: 98 % | HEIGHT: 64 IN

## 2022-01-27 DIAGNOSIS — Z11.59 NEED FOR HEPATITIS C SCREENING TEST: ICD-10-CM

## 2022-01-27 DIAGNOSIS — E11.65 TYPE 2 DIABETES MELLITUS WITH HYPERGLYCEMIA, WITHOUT LONG-TERM CURRENT USE OF INSULIN: Primary | ICD-10-CM

## 2022-01-27 DIAGNOSIS — I10 BENIGN ESSENTIAL HTN: ICD-10-CM

## 2022-01-27 DIAGNOSIS — M19.90 ARTHRITIS: ICD-10-CM

## 2022-01-27 DIAGNOSIS — I50.22 CHRONIC SYSTOLIC CONGESTIVE HEART FAILURE: ICD-10-CM

## 2022-01-27 DIAGNOSIS — E78.49 OTHER HYPERLIPIDEMIA: ICD-10-CM

## 2022-01-27 DIAGNOSIS — E55.9 VITAMIN D DEFICIENCY: ICD-10-CM

## 2022-01-27 LAB
ALBUMIN SERPL-MCNC: 5.1 G/DL (ref 3.5–5.2)
ALBUMIN/GLOB SERPL: 2 G/DL
ALP SERPL-CCNC: 70 U/L (ref 39–117)
ALT SERPL W P-5'-P-CCNC: 16 U/L (ref 1–33)
ANION GAP SERPL CALCULATED.3IONS-SCNC: 10 MMOL/L (ref 5–15)
AST SERPL-CCNC: 13 U/L (ref 1–32)
BASOPHILS # BLD AUTO: 0.06 10*3/MM3 (ref 0–0.2)
BASOPHILS NFR BLD AUTO: 1 % (ref 0–1.5)
BILIRUB SERPL-MCNC: 0.3 MG/DL (ref 0–1.2)
BUN SERPL-MCNC: 13 MG/DL (ref 8–23)
BUN/CREAT SERPL: 15.5 (ref 7–25)
CALCIUM SPEC-SCNC: 10.6 MG/DL (ref 8.6–10.5)
CHLORIDE SERPL-SCNC: 103 MMOL/L (ref 98–107)
CHOLEST SERPL-MCNC: 129 MG/DL (ref 0–200)
CO2 SERPL-SCNC: 27 MMOL/L (ref 22–29)
CREAT SERPL-MCNC: 0.84 MG/DL (ref 0.57–1)
DEPRECATED RDW RBC AUTO: 39.9 FL (ref 37–54)
EOSINOPHIL # BLD AUTO: 0.14 10*3/MM3 (ref 0–0.4)
EOSINOPHIL NFR BLD AUTO: 2.4 % (ref 0.3–6.2)
ERYTHROCYTE [DISTWIDTH] IN BLOOD BY AUTOMATED COUNT: 13.3 % (ref 12.3–15.4)
GFR SERPL CREATININE-BSD FRML MDRD: 84 ML/MIN/1.73
GLOBULIN UR ELPH-MCNC: 2.5 GM/DL
GLUCOSE SERPL-MCNC: 110 MG/DL (ref 65–99)
HBA1C MFR BLD: 8.29 % (ref 4.8–5.6)
HCT VFR BLD AUTO: 44.2 % (ref 34–46.6)
HCV AB SER DONR QL: NORMAL
HDLC SERPL-MCNC: 63 MG/DL (ref 40–60)
HGB BLD-MCNC: 14.3 G/DL (ref 12–15.9)
IMM GRANULOCYTES # BLD AUTO: 0.01 10*3/MM3 (ref 0–0.05)
IMM GRANULOCYTES NFR BLD AUTO: 0.2 % (ref 0–0.5)
LDLC SERPL CALC-MCNC: 49 MG/DL (ref 0–100)
LDLC/HDLC SERPL: 0.76 {RATIO}
LYMPHOCYTES # BLD AUTO: 2.85 10*3/MM3 (ref 0.7–3.1)
LYMPHOCYTES NFR BLD AUTO: 48.8 % (ref 19.6–45.3)
MCH RBC QN AUTO: 26.9 PG (ref 26.6–33)
MCHC RBC AUTO-ENTMCNC: 32.4 G/DL (ref 31.5–35.7)
MCV RBC AUTO: 83.1 FL (ref 79–97)
MONOCYTES # BLD AUTO: 0.44 10*3/MM3 (ref 0.1–0.9)
MONOCYTES NFR BLD AUTO: 7.5 % (ref 5–12)
NEUTROPHILS NFR BLD AUTO: 2.34 10*3/MM3 (ref 1.7–7)
NEUTROPHILS NFR BLD AUTO: 40.1 % (ref 42.7–76)
NRBC BLD AUTO-RTO: 0 /100 WBC (ref 0–0.2)
PLATELET # BLD AUTO: 231 10*3/MM3 (ref 140–450)
PMV BLD AUTO: 11.3 FL (ref 6–12)
POTASSIUM SERPL-SCNC: 3.9 MMOL/L (ref 3.5–5.2)
PROT SERPL-MCNC: 7.6 G/DL (ref 6–8.5)
RBC # BLD AUTO: 5.32 10*6/MM3 (ref 3.77–5.28)
SODIUM SERPL-SCNC: 140 MMOL/L (ref 136–145)
TRIGL SERPL-MCNC: 90 MG/DL (ref 0–150)
VLDLC SERPL-MCNC: 17 MG/DL (ref 5–40)
WBC NRBC COR # BLD: 5.84 10*3/MM3 (ref 3.4–10.8)

## 2022-01-27 PROCEDURE — 99214 OFFICE O/P EST MOD 30 MIN: CPT | Performed by: INTERNAL MEDICINE

## 2022-01-27 PROCEDURE — 80061 LIPID PANEL: CPT | Performed by: INTERNAL MEDICINE

## 2022-01-27 PROCEDURE — 82652 VIT D 1 25-DIHYDROXY: CPT | Performed by: INTERNAL MEDICINE

## 2022-01-27 PROCEDURE — 85025 COMPLETE CBC W/AUTO DIFF WBC: CPT | Performed by: INTERNAL MEDICINE

## 2022-01-27 PROCEDURE — 80053 COMPREHEN METABOLIC PANEL: CPT | Performed by: INTERNAL MEDICINE

## 2022-01-27 PROCEDURE — 83036 HEMOGLOBIN GLYCOSYLATED A1C: CPT | Performed by: INTERNAL MEDICINE

## 2022-01-27 PROCEDURE — 86803 HEPATITIS C AB TEST: CPT | Performed by: INTERNAL MEDICINE

## 2022-01-27 NOTE — PROGRESS NOTES
Chief Complaint  Diabetes, Back Pain (pt states this has been bothering her for a while), and Under Rib Pain    Subjective          Silvia Massey presents to [facility]  History of Present Illness  osteoarthritis and degenerative disc disease- patient has been taking tylenol, gabapentin, baclofen to help as needed. Patient report sinc elbow pain recently.   CHF- patient without chest pain, shortness of breath, edema  DM2- patient reports poor dietary compliance recently. Patient has gained weight.   hyperlipidemia- doing well on statin and ASA.   hypertension- patient without home readings. Patient thinks higher reading due to recent stressor with taking animals to the vet.   Vitamin D def- due for recheck     Current Outpatient Medications   Medication Instructions   • albuterol (PROVENTIL) 2.5 mg, Nebulization, 4 Times Daily PRN   • aspirin 81 mg, Oral, Daily   • baclofen (LIORESAL) 10 mg, Oral, 3 Times Daily   • carvedilol (COREG) 6.25 MG tablet Take 1 tablet by mouth twice daily   • Diclofenac Sodium (VOLTAREN) 4 g, Topical, 4 Times Daily PRN   • Entresto 49-51 MG tablet Take 1 tablet by mouth twice daily   • Farxiga 10 MG tablet TAKE 1 TABLET BY MOUTH ONCE DAILY IN THE MORNING FOR 90 DAYS   • ferrous sulfate 325 mg, Oral, Daily With Breakfast   • furosemide (LASIX) 40 MG tablet furosemide 40 mg oral tablet take 1 tablet (40 mg) by oral route once daily   Suspended   • gabapentin (NEURONTIN) 600 mg, Oral, Nightly PRN   • hydrocortisone 1 % lotion Topical, 2 Times Daily   • mometasone-formoterol (DULERA 100) 100-5 MCG/ACT inhaler 2 puffs, Inhalation, 2 Times Daily - RT   • pantoprazole (PROTONIX) 40 mg, Oral, Daily   • rosuvastatin (CRESTOR) 10 mg, Oral, Every Night at Bedtime   • spironolactone (ALDACTONE) 25 mg, Oral, Daily   • Trulicity 1.5 MG/0.5ML solution pen-injector INJECT 0.5 ML SUB-Q EVERY 7 DAYS IN THE ABDOMEN, THIGH OR UPPER ARM ROTATING INJECTION  "SITES   • vitamin D (ERGOCALCIFEROL) 1.25 MG (06007 UT) capsule capsule Take 1 capsule by mouth once a week       The following portions of the patient's history were reviewed and updated as appropriate: allergies, current medications, past family history, past medical history, past social history, past surgical history, and problem list.    Objective   Vital Signs:   /91   Pulse 73   Temp 97.3 °F (36.3 °C) (Temporal)   Ht 161.3 cm (63.5\")   Wt 89.9 kg (198 lb 2 oz)   SpO2 98%   BMI 34.55 kg/m²     Wt Readings from Last 3 Encounters:   01/27/22 89.9 kg (198 lb 2 oz)   12/21/21 88.3 kg (194 lb 9.6 oz)   11/18/21 88 kg (194 lb)     BP Readings from Last 3 Encounters:   01/27/22 146/91   12/21/21 137/83   11/18/21 129/62     Physical Exam   Appearance: No acute distress, well-nourished  Head: normocephalic, atraumatic  Eyes: extraocular movements intact, no scleral icterus, no conjunctival injection  Ears, Nose, and Throat: external ears normal, nares patent, moist mucous membranes  Cardiovascular: regular rate and rhythm. no murmurs, rales, or rhonchi. no edema  Respiratory: breathing comfortably, symmetric chest rise, clear to auscultation bilaterally. No wheezes, rales, or rhonchi.  Neuro: alert and oriented to time, place, and person. Normal gait  Psych: normal mood and affect     Result Review :   The following data was reviewed by: Louie Jang Jr, MD on 01/27/2022:  Common labs    Common Labsle 4/5/21 4/5/21 5/11/21 5/11/21 7/27/21 7/27/21 7/27/21 7/27/21    1623 1623 0725 0725 1719 1719 1719 1719   Glucose  160 (A)  116 (A)    69   BUN  13  12    18   Creatinine  0.84  0.85    0.83   eGFR African Am        85   Sodium  139  140    143   Potassium  4.6  4.0    4.1   Chloride  101  104    107   Calcium  10.1  9.8    10.3   Albumin  4.8      5.20   Total Bilirubin  0.33      0.2   Alkaline Phosphatase  82      65   AST (SGOT)  22      18   ALT (SGPT)  31      20   WBC 5.72  4.79 (A)  5.98    "   Hemoglobin 14.4  14.4  14.8      Hematocrit 45.4  44.6  46.6      Platelets 235  228  250      Total Cholesterol       129    Total Cholesterol  123         Triglycerides  83     99    HDL Cholesterol  62 (A)     58    LDL Cholesterol   44 (A)     53    Hemoglobin A1C  7.1 (A)    6.20 (A)     (A) Abnormal value       Comments are available for some flowsheets but are not being displayed.              Assessment and Plan    Diagnoses and all orders for this visit:    1. Type 2 diabetes mellitus with hyperglycemia, without long-term current use of insulin (HCC) (Primary)  Comments:  discussed inc dose of trulicity if needed.   Orders:  -     Hemoglobin A1c    2. Benign essential HTN  Comments:  monitor closely. cont current regimen.   Orders:  -     CBC & Differential  -     Comprehensive Metabolic Panel    3. Chronic systolic congestive heart failure (HCC)  Comments:  euvolemic today. cont regimen. f/u with caridology    4. Vitamin D deficiency  -     Vitamin D 1,25 Dihydroxy    5. Other hyperlipidemia  -     Comprehensive Metabolic Panel  -     Lipid Panel    6. Need for hepatitis C screening test  -     Hepatitis C Antibody    7. Arthritis  Comments:  apply volatren to elbows. cont other med regimen. discuss cymbalta or elavil but pt defers.   Orders:  -     Diclofenac Sodium (Voltaren) 1 % gel gel; Apply 4 g topically to the appropriate area as directed 4 (Four) Times a Day As Needed (pain).  Dispense: 350 g; Refill: 1        There are no discontinued medications.     Follow Up   Return in about 6 months (around 7/27/2022) for Recheck.  Patient was given instructions and counseling regarding her condition or for health maintenance advice. Please see specific information pulled into the AVS if appropriate.

## 2022-01-28 RX ORDER — DULAGLUTIDE 3 MG/.5ML
3 INJECTION, SOLUTION SUBCUTANEOUS WEEKLY
Qty: 13 PEN | Refills: 3 | Status: SHIPPED | OUTPATIENT
Start: 2022-01-28 | End: 2022-04-18 | Stop reason: ALTCHOICE

## 2022-01-28 RX ORDER — CARVEDILOL 6.25 MG/1
TABLET ORAL
Qty: 60 TABLET | Refills: 11 | Status: SHIPPED | OUTPATIENT
Start: 2022-01-28 | End: 2022-01-31 | Stop reason: SDUPTHER

## 2022-01-31 LAB — 1,25(OH)2D SERPL-MCNC: 58.2 PG/ML (ref 19.9–79.3)

## 2022-01-31 RX ORDER — CARVEDILOL 6.25 MG/1
6.25 TABLET ORAL 2 TIMES DAILY
Qty: 180 TABLET | Refills: 3 | Status: SHIPPED | OUTPATIENT
Start: 2022-01-31 | End: 2023-01-26 | Stop reason: SDUPTHER

## 2022-01-31 NOTE — TELEPHONE ENCOUNTER
Caller: Bryson Silvia NANCIE    Relationship: Self    Best call back number: [phone]     Requested Prescriptions:   Requested Prescriptions     Pending Prescriptions Disp Refills   • carvedilol (COREG) 6.25 MG tablet 60 tablet 11     Sig: Take 1 tablet by mouth 2 (Two) Times a Day.     - FLUCONAZOLE (NOT FOUND IN MED LIST)    Pharmacy where request should be sent: [address] [phone] [facility] [phone]      Additional details provided by patient: PATIENT REQUESTED REFILLS ON FLUCONAZOLE    Does the patient have less than a 3 day supply:  [x] Yes  [] No    Joceline Bains Rep   01/31/22 08:33 EST

## 2022-02-07 ENCOUNTER — TELEPHONE (OUTPATIENT)
Dept: INTERNAL MEDICINE | Facility: CLINIC | Age: 62
End: 2022-02-07

## 2022-02-07 DIAGNOSIS — B37.9 YEAST INFECTION: Primary | ICD-10-CM

## 2022-02-07 RX ORDER — SPIRONOLACTONE 25 MG/1
25 TABLET ORAL DAILY
Qty: 90 TABLET | Refills: 3 | Status: CANCELLED | OUTPATIENT
Start: 2022-02-07

## 2022-02-07 NOTE — TELEPHONE ENCOUNTER
Patient is calling and stating that she needs Diflucan 200 Mg refilled. She was RX it back in 9/2020.   She just used the last one.   I didn't see it in her medication list to refill it. So she was needing it to be sent it to the [facility] pharmacy in [facility].

## 2022-02-08 RX ORDER — FLUCONAZOLE 150 MG/1
150 TABLET ORAL
Qty: 10 TABLET | Refills: 0 | Status: SHIPPED | OUTPATIENT
Start: 2022-02-08 | End: 2022-04-04

## 2022-02-21 RX ORDER — ROSUVASTATIN CALCIUM 10 MG/1
TABLET, COATED ORAL
Qty: 90 TABLET | Refills: 0 | Status: SHIPPED | OUTPATIENT
Start: 2022-02-21 | End: 2022-06-13

## 2022-04-04 ENCOUNTER — TELEPHONE (OUTPATIENT)
Dept: INTERNAL MEDICINE | Facility: CLINIC | Age: 62
End: 2022-04-04

## 2022-04-04 ENCOUNTER — OFFICE VISIT (OUTPATIENT)
Dept: INTERNAL MEDICINE | Facility: CLINIC | Age: 62
End: 2022-04-04

## 2022-04-04 VITALS
BODY MASS INDEX: 34.66 KG/M2 | HEIGHT: 64 IN | TEMPERATURE: 97.6 F | WEIGHT: 203 LBS | HEART RATE: 85 BPM | SYSTOLIC BLOOD PRESSURE: 117 MMHG | DIASTOLIC BLOOD PRESSURE: 76 MMHG | OXYGEN SATURATION: 97 %

## 2022-04-04 DIAGNOSIS — M54.42 CHRONIC MIDLINE LOW BACK PAIN WITH LEFT-SIDED SCIATICA: Primary | ICD-10-CM

## 2022-04-04 DIAGNOSIS — M25.522 LEFT ELBOW PAIN: ICD-10-CM

## 2022-04-04 DIAGNOSIS — G89.29 CHRONIC MIDLINE LOW BACK PAIN WITH LEFT-SIDED SCIATICA: Primary | ICD-10-CM

## 2022-04-04 DIAGNOSIS — G56.22 ULNAR NERVE COMPRESSION, LEFT: ICD-10-CM

## 2022-04-04 PROCEDURE — 96372 THER/PROPH/DIAG INJ SC/IM: CPT | Performed by: STUDENT IN AN ORGANIZED HEALTH CARE EDUCATION/TRAINING PROGRAM

## 2022-04-04 PROCEDURE — 99214 OFFICE O/P EST MOD 30 MIN: CPT | Performed by: STUDENT IN AN ORGANIZED HEALTH CARE EDUCATION/TRAINING PROGRAM

## 2022-04-04 RX ORDER — DEXAMETHASONE SODIUM PHOSPHATE 10 MG/ML
10 INJECTION INTRAMUSCULAR; INTRAVENOUS ONCE
Status: DISCONTINUED | OUTPATIENT
Start: 2022-04-04 | End: 2022-04-04

## 2022-04-04 RX ORDER — BACLOFEN 10 MG/1
10 TABLET ORAL 3 TIMES DAILY
Qty: 90 TABLET | Refills: 2 | Status: SHIPPED | OUTPATIENT
Start: 2022-04-04 | End: 2023-03-13

## 2022-04-04 RX ORDER — DEXAMETHASONE SODIUM PHOSPHATE 10 MG/ML
10 INJECTION INTRAMUSCULAR; INTRAVENOUS ONCE
Status: COMPLETED | OUTPATIENT
Start: 2022-04-04 | End: 2022-04-04

## 2022-04-04 RX ADMIN — DEXAMETHASONE SODIUM PHOSPHATE 10 MG: 10 INJECTION INTRAMUSCULAR; INTRAVENOUS at 09:42

## 2022-04-04 NOTE — PROGRESS NOTES
"Chief Complaint  Back Pain (Lower back. Started Wednesday.) and Elbow Pain (Started today, numbness in fingers. )    Subjective          Silvia Massey presents to [facility]  History of Present Illness    Back pain since Wednesday 3/30/2022.  Back pain present in lower back and has been as high as a \"twenty\", with radiation in left leg.  This is the worst it has ever been per her.  No obvious injuries.  No urinary problems and no saddle anesthesia.    [facility].  Got oral steroids (I suspect a medrol pack) but didn't take as concerned about weight gain side effect.    Desires steroid shot today.    In addition, during this same time she reports left elbow pain, with tingling radiating into 4th and 5th finger.  She is not aware if she has been leaning on her elbow per se, but does use her left arm a lot with her cane for ambulation.      Current Outpatient Medications   Medication Instructions   • albuterol (PROVENTIL) 2.5 mg, Nebulization, 4 Times Daily PRN   • aspirin 81 mg, Oral, Daily   • baclofen (LIORESAL) 10 mg, Oral, 3 Times Daily   • carvedilol (COREG) 6.25 mg, Oral, 2 Times Daily   • Diclofenac Sodium (VOLTAREN) 4 g, Topical, 4 Times Daily PRN   • Entresto 49-51 MG tablet Take 1 tablet by mouth twice daily   • Farxiga 10 MG tablet TAKE 1 TABLET BY MOUTH ONCE DAILY IN THE MORNING FOR 90 DAYS   • gabapentin (NEURONTIN) 600 mg, Oral, Nightly PRN   • pantoprazole (PROTONIX) 40 mg, Oral, Daily   • rosuvastatin (CRESTOR) 10 MG tablet TAKE 1 TABLET BY MOUTH EVERY DAY AT BEDTIME   • spironolactone (ALDACTONE) 25 mg, Oral, Daily   • Trulicity 3 mg, Subcutaneous, Weekly   • vitamin D (ERGOCALCIFEROL) 1.25 MG (46226 UT) capsule capsule Take 1 capsule by mouth once a week       The following portions of the patient's history were reviewed and updated as appropriate: allergies, current medications, past family history, past medical history, past " "social history, past surgical history, and problem list.    Objective   Vital Signs:   /76 (BP Location: Left arm, Patient Position: Sitting, Cuff Size: Adult)   Pulse 85   Temp 97.6 °F (36.4 °C) (Temporal)   Ht 161.3 cm (63.5\")   Wt 92.1 kg (203 lb)   SpO2 97%   BMI 35.40 kg/m²     Wt Readings from Last 3 Encounters:   04/04/22 92.1 kg (203 lb)   01/27/22 89.9 kg (198 lb 2 oz)   12/21/21 88.3 kg (194 lb 9.6 oz)     BP Readings from Last 3 Encounters:   04/04/22 117/76   01/27/22 146/91   12/21/21 137/83     Physical Exam   Appearance: No acute distress, well-nourished  Head: normocephalic, atraumatic  Eyes:  no scleral icterus, no conjunctival injection  Ears, Nose, and Throat: external ears normal  Cardiovascular: regular rate and rhythm. no murmurs, rubs, or gallops. no edema  Respiratory: breathing comfortably, symmetric chest rise, clear to auscultation bilaterally. No wheezes, rales, or rhonchi.  GI: soft, NTTP, no masses or HSM  Neuro: alert and oriented  Psych: normal mood and affect     Result Review :   The following data was reviewed by: Sumit Kerns MD on 04/04/2022:  Common labs    Common Labsle 5/11/21 5/11/21 7/27/21 7/27/21 7/27/21 7/27/21 1/27/22 1/27/22 1/27/22 1/27/22    0725 0725 1719 1719 1719 1719 1724 1724 1724 1724   Glucose  116 (A)    69   110 (A)    BUN  12    18   13    Creatinine  0.85    0.83   0.84    eGFR  Am      85   84    Sodium  140    143   140    Potassium  4.0    4.1   3.9    Chloride  104    107   103    Calcium  9.8    10.3   10.6 (A)    Albumin      5.20   5.10    Total Bilirubin      0.2   0.3    Alkaline Phosphatase      65   70    AST (SGOT)      18   13    ALT (SGPT)      20   16    WBC 4.79 (A)  5.98    5.84      Hemoglobin 14.4  14.8    14.3      Hematocrit 44.6  46.6    44.2      Platelets 228  250    231      Total Cholesterol     129     129   Triglycerides     99     90   HDL Cholesterol     58     63 (A)   LDL Cholesterol      53     49 "   Hemoglobin A1C    6.20 (A)    8.29 (A)     (A) Abnormal value                   Lab Results   Component Value Date    INR 0.99 (L) 05/11/2021       Procedures        Assessment and Plan    Diagnoses and all orders for this visit:    1. Chronic midline low back pain with left-sided sciatica (Primary)  -     baclofen (LIORESAL) 10 MG tablet; Take 1 tablet by mouth 3 (Three) Times a Day.  Dispense: 90 tablet; Refill: 2  -     Discontinue: dexamethasone (DECADRON) injection 10 mg  -     dexamethasone (DECADRON) injection 10 mg    2. Left elbow pain    3. Ulnar nerve compression, left      Elbow pain:  -symptoms suggestive of ulnar nerve irritation.  Suspect she has been leaning on her elbow  -conservative treatment (rest, ice)      Medications Discontinued During This Encounter   Medication Reason   • ferrous sulfate 325 (65 FE) MG tablet    • fluconazole (Diflucan) 150 MG tablet    • furosemide (LASIX) 40 MG tablet    • hydrocortisone 1 % lotion    • mometasone-formoterol (DULERA 100) 100-5 MCG/ACT inhaler    • baclofen (LIORESAL) 10 MG tablet Reorder   • dexamethasone (DECADRON) injection 10 mg           Follow Up {Instructions Charge Capture  Follow-up Communications :23}  Return if symptoms worsen or fail to improve.  Patient was given instructions and counseling regarding her condition or for health maintenance advice. Please see specific information pulled into the AVS if appropriate.       Sumit Kerns MD  04/04/22  10:00 EDT

## 2022-04-04 NOTE — TELEPHONE ENCOUNTER
Patient called in this morning stating she missed her appointment due to her being at the [place] office, where she is established. She stated to the [facility] that she would not be rescheduling and would need to be seen today. When I received the call I stated to the patient that we would not be able to see her due to her being established in the [place] office and only those providers would be able to see her,the patient then stated that due to her having a Mychart we would be able to see her since all of her records are in there and she has been seen in our office previously so we would have all her records. I explained to the patient that due to her being established out of our office and [facility] recently switching offices we would not be able to see her but I could give her the number to the [place] office in order for her to call and get an appointment there. The patient then stated that she did not need the number to her doctors office since she knew the number, the patient then stated that she would be putting in a complaint, I then told the patient to have a great day and the patient hung up.

## 2022-04-08 ENCOUNTER — OFFICE VISIT (OUTPATIENT)
Dept: INTERNAL MEDICINE | Facility: CLINIC | Age: 62
End: 2022-04-08

## 2022-04-08 VITALS
OXYGEN SATURATION: 98 % | BODY MASS INDEX: 33.97 KG/M2 | TEMPERATURE: 97.2 F | WEIGHT: 199 LBS | HEART RATE: 92 BPM | DIASTOLIC BLOOD PRESSURE: 77 MMHG | HEIGHT: 64 IN | SYSTOLIC BLOOD PRESSURE: 124 MMHG

## 2022-04-08 DIAGNOSIS — M79.89 SWELLING OF LEFT HAND: ICD-10-CM

## 2022-04-08 DIAGNOSIS — M25.412 SWELLING OF JOINT OF LEFT SHOULDER: ICD-10-CM

## 2022-04-08 DIAGNOSIS — M25.422 SWELLING OF LEFT ELBOW: Primary | ICD-10-CM

## 2022-04-08 DIAGNOSIS — M70.22 OLECRANON BURSITIS OF LEFT ELBOW: ICD-10-CM

## 2022-04-08 PROCEDURE — 99213 OFFICE O/P EST LOW 20 MIN: CPT | Performed by: STUDENT IN AN ORGANIZED HEALTH CARE EDUCATION/TRAINING PROGRAM

## 2022-04-08 RX ORDER — METHYLPREDNISOLONE 4 MG/1
TABLET ORAL
Qty: 21 EACH | Refills: 0 | Status: SHIPPED | OUTPATIENT
Start: 2022-04-08 | End: 2022-04-18

## 2022-04-08 RX ORDER — METHYLPREDNISOLONE 4 MG/1
TABLET ORAL
Qty: 21 EACH | Refills: 0 | Status: SHIPPED | OUTPATIENT
Start: 2022-04-08 | End: 2022-04-08

## 2022-04-08 NOTE — PROGRESS NOTES
Chief Complaint  Elbow Pain (Swelling, pain radiates down the arm into the fingers. Had some swelling in the fingers. )    Subjective          Silvia Massey presents to [facility]  History of Present Illness    Seen Monday and received IM steroid shot.  Since then, having swelling left elbow and to a lesser extent shoulder and hand.  Hand had ulnar side tingling before, but now tingling in all fingers.    Swelling has improved from its worst.  Has been using tylenol.  Reports that elbow hurts.    No trauma to elbow.  Denies leaning on it or applying pressure to area.  Reports having trouble raising left shoulder over head.           Current Outpatient Medications   Medication Instructions   • albuterol (PROVENTIL) 2.5 mg, Nebulization, 4 Times Daily PRN   • aspirin 81 mg, Oral, Daily   • baclofen (LIORESAL) 10 mg, Oral, 3 Times Daily   • carvedilol (COREG) 6.25 mg, Oral, 2 Times Daily   • Diclofenac Sodium (VOLTAREN) 4 g, Topical, 4 Times Daily PRN   • Entresto 49-51 MG tablet Take 1 tablet by mouth twice daily   • Farxiga 10 MG tablet TAKE 1 TABLET BY MOUTH ONCE DAILY IN THE MORNING FOR 90 DAYS   • gabapentin (NEURONTIN) 600 mg, Oral, Nightly PRN   • methylPREDNISolone (MEDROL) 4 MG dose pack Take as directed on package instructions.   • pantoprazole (PROTONIX) 40 mg, Oral, Daily   • rosuvastatin (CRESTOR) 10 MG tablet TAKE 1 TABLET BY MOUTH EVERY DAY AT BEDTIME   • spironolactone (ALDACTONE) 25 mg, Oral, Daily   • Trulicity 3 mg, Subcutaneous, Weekly   • vitamin D (ERGOCALCIFEROL) 1.25 MG (79665 UT) capsule capsule Take 1 capsule by mouth once a week       The following portions of the patient's history were reviewed and updated as appropriate: allergies, current medications, past family history, past medical history, past social history, past surgical history, and problem list.    Objective   Vital Signs:   /77 (BP Location: Right arm, Patient Position:  "Sitting, Cuff Size: Large Adult)   Pulse 92   Temp 97.2 °F (36.2 °C) (Temporal)   Ht 161.4 cm (63.55\")   Wt 90.3 kg (199 lb)   SpO2 98%   BMI 34.64 kg/m²     Wt Readings from Last 3 Encounters:   04/08/22 90.3 kg (199 lb)   04/04/22 92.1 kg (203 lb)   01/27/22 89.9 kg (198 lb 2 oz)     BP Readings from Last 3 Encounters:   04/08/22 124/77   04/04/22 117/76   01/27/22 146/91     Physical Exam   Appearance: No acute distress, well-nourished  Head: normocephalic, atraumatic  Eyes: no scleral icterus, no conjunctival injection  Ears, Nose, and Throat: external ears normal, nares patent, moist mucous membranes  Cardiovascular: regular rate and rhythm. no murmurs, rubs, or gallops. no edema  Respiratory: breathing comfortably, symmetric chest rise, clear to auscultation bilaterally. No wheezes, rales, or rhonchi.  Neuro: alert and oriented  Psych: normal mood and affect   MSK: olecranon area edematous and boggy to touch, left elbow.  No warmth, no erythema.  Bursa is tender to touch.  Small edematous area left acromoclavicular area.  No swelling noted exam of left hand    Result Review :{Labs  Result Review  Imaging  Med Tab  Media  Procedures :23}   The following data was reviewed by: Sumit Kerns MD on 04/08/2022:  Common labs    Common Labsle 5/11/21 5/11/21 7/27/21 7/27/21 7/27/21 7/27/21 1/27/22 1/27/22 1/27/22 1/27/22    0725 0725 1719 1719 1719 1719 1724 1724 1724 1724   Glucose  116 (A)    69   110 (A)    BUN  12    18   13    Creatinine  0.85    0.83   0.84    eGFR  Am      85   84    Sodium  140    143   140    Potassium  4.0    4.1   3.9    Chloride  104    107   103    Calcium  9.8    10.3   10.6 (A)    Albumin      5.20   5.10    Total Bilirubin      0.2   0.3    Alkaline Phosphatase      65   70    AST (SGOT)      18   13    ALT (SGPT)      20   16    WBC 4.79 (A)  5.98    5.84      Hemoglobin 14.4  14.8    14.3      Hematocrit 44.6  46.6    44.2      Platelets 228  250    231    "   Total Cholesterol     129     129   Triglycerides     99     90   HDL Cholesterol     58     63 (A)   LDL Cholesterol      53     49   Hemoglobin A1C    6.20 (A)    8.29 (A)     (A) Abnormal value                   Lab Results   Component Value Date    INR 0.99 (L) 05/11/2021       Procedures        Assessment and Plan    Diagnoses and all orders for this visit:    1. Swelling of left elbow (Primary)  -     Discontinue: methylPREDNISolone (MEDROL) 4 MG dose pack; Take as directed on package instructions.  Dispense: 21 each; Refill: 0  -     methylPREDNISolone (MEDROL) 4 MG dose pack; Take as directed on package instructions.  Dispense: 21 each; Refill: 0    2. Swelling of left hand    3. Swelling of joint of left shoulder  -     Discontinue: methylPREDNISolone (MEDROL) 4 MG dose pack; Take as directed on package instructions.  Dispense: 21 each; Refill: 0  -     methylPREDNISolone (MEDROL) 4 MG dose pack; Take as directed on package instructions.  Dispense: 21 each; Refill: 0    4. Olecranon bursitis of left elbow          Medications Discontinued During This Encounter   Medication Reason   • methylPREDNISolone (MEDROL) 4 MG dose pack           Follow Up   Return in about 1 week (around 4/15/2022) for left elbow swelling.  Patient was given instructions and counseling regarding her condition or for health maintenance advice. Please see specific information pulled into the AVS if appropriate.       Sumit Kerns MD  04/08/22  18:20 EDT

## 2022-04-13 ENCOUNTER — TELEPHONE (OUTPATIENT)
Dept: INTERNAL MEDICINE | Facility: CLINIC | Age: 62
End: 2022-04-13

## 2022-04-13 DIAGNOSIS — M25.522 LEFT ELBOW PAIN: Primary | ICD-10-CM

## 2022-04-13 NOTE — TELEPHONE ENCOUNTER
PATIENT CALLED STATING THAT SHE WAS IN OFFICE LAST Friday AND THAT A REFERRAL FOR ORTHO WAS SUPPOSE TO BE PUT IN BUT I AM NOT SEEING IT - PLEASE ADVICE  THANKS

## 2022-04-18 ENCOUNTER — OFFICE VISIT (OUTPATIENT)
Dept: INTERNAL MEDICINE | Facility: CLINIC | Age: 62
End: 2022-04-18

## 2022-04-18 VITALS
WEIGHT: 200 LBS | OXYGEN SATURATION: 97 % | HEART RATE: 86 BPM | BODY MASS INDEX: 34.15 KG/M2 | SYSTOLIC BLOOD PRESSURE: 120 MMHG | DIASTOLIC BLOOD PRESSURE: 74 MMHG | TEMPERATURE: 96.8 F | HEIGHT: 64 IN

## 2022-04-18 DIAGNOSIS — Z12.31 BREAST CANCER SCREENING BY MAMMOGRAM: ICD-10-CM

## 2022-04-18 DIAGNOSIS — I10 BENIGN ESSENTIAL HTN: Primary | ICD-10-CM

## 2022-04-18 DIAGNOSIS — M51.36 DDD (DEGENERATIVE DISC DISEASE), LUMBAR: ICD-10-CM

## 2022-04-18 DIAGNOSIS — I50.22 CHRONIC SYSTOLIC CONGESTIVE HEART FAILURE: ICD-10-CM

## 2022-04-18 DIAGNOSIS — E78.49 OTHER HYPERLIPIDEMIA: ICD-10-CM

## 2022-04-18 DIAGNOSIS — E11.65 TYPE 2 DIABETES MELLITUS WITH HYPERGLYCEMIA, WITHOUT LONG-TERM CURRENT USE OF INSULIN: ICD-10-CM

## 2022-04-18 PROBLEM — M51.369 DDD (DEGENERATIVE DISC DISEASE), LUMBAR: Status: ACTIVE | Noted: 2022-04-18

## 2022-04-18 PROCEDURE — 96372 THER/PROPH/DIAG INJ SC/IM: CPT | Performed by: INTERNAL MEDICINE

## 2022-04-18 PROCEDURE — 99214 OFFICE O/P EST MOD 30 MIN: CPT | Performed by: INTERNAL MEDICINE

## 2022-04-18 RX ORDER — KETOROLAC TROMETHAMINE 30 MG/ML
60 INJECTION, SOLUTION INTRAMUSCULAR; INTRAVENOUS ONCE
Status: COMPLETED | OUTPATIENT
Start: 2022-04-18 | End: 2022-04-18

## 2022-04-18 RX ORDER — DULAGLUTIDE 4.5 MG/.5ML
4.5 INJECTION, SOLUTION SUBCUTANEOUS WEEKLY
Qty: 13 PEN | Refills: 3 | Status: SHIPPED | OUTPATIENT
Start: 2022-04-18

## 2022-04-18 RX ADMIN — KETOROLAC TROMETHAMINE 60 MG: 30 INJECTION, SOLUTION INTRAMUSCULAR; INTRAVENOUS at 12:26

## 2022-04-18 NOTE — PROGRESS NOTES
Chief Complaint  Diabetes (Type 2 ), Joint Swelling (Follow up- left elbow has a referral but still hasn't got a call on an appt yet ), and Back Pain (Still on going )    Subjective          Silvia Massey presents to [facility]  History of Present Illness  hypertension- patient denies Has, dizziness, chest pain.   DM2- patient reports recent dietary noncompliance. Patient has been doing trulicity 3mg, but does not notice effectiveness. Patient is eating more candy and eating more at nighttime.   hyperlipidemia- doing well on statin and ASA  CHF- doing well on regimen. Patient without shortness of breath, JONES.     Lumbar degenerative disc disease has worsened over time and is going to follow-up with Neurosurgery to discuss treatment options. Patient reports back pain is causing her to be tearful. Patient reports no help with gabapentin or baclofen.     Current Outpatient Medications   Medication Instructions   • albuterol (PROVENTIL) 2.5 mg, Nebulization, 4 Times Daily PRN   • aspirin 81 mg, Oral, Daily   • baclofen (LIORESAL) 10 mg, Oral, 3 Times Daily   • carvedilol (COREG) 6.25 mg, Oral, 2 Times Daily   • Diclofenac Sodium (VOLTAREN) 4 g, Topical, 4 Times Daily PRN   • Entresto 49-51 MG tablet Take 1 tablet by mouth twice daily   • Farxiga 10 MG tablet TAKE 1 TABLET BY MOUTH ONCE DAILY IN THE MORNING FOR 90 DAYS   • gabapentin (NEURONTIN) 600 mg, Oral, Nightly PRN   • pantoprazole (PROTONIX) 40 mg, Oral, Daily   • rosuvastatin (CRESTOR) 10 MG tablet TAKE 1 TABLET BY MOUTH EVERY DAY AT BEDTIME   • spironolactone (ALDACTONE) 25 mg, Oral, Daily   • Trulicity 4.5 mg, Subcutaneous, Weekly   • vitamin D (ERGOCALCIFEROL) 1.25 MG (72328 UT) capsule capsule Take 1 capsule by mouth once a week       The following portions of the patient's history were reviewed and updated as appropriate: allergies, current medications, past family history, past medical history, past social  "history, past surgical history, and problem list.    Objective   Vital Signs:   /74   Pulse 86   Temp 96.8 °F (36 °C) (Temporal)   Ht 162.6 cm (64\")   Wt 90.7 kg (200 lb)   SpO2 97%   BMI 34.33 kg/m²     Wt Readings from Last 3 Encounters:   04/18/22 90.7 kg (200 lb)   04/08/22 90.3 kg (199 lb)   04/04/22 92.1 kg (203 lb)     BP Readings from Last 3 Encounters:   04/18/22 120/74   04/08/22 124/77   04/04/22 117/76     Physical Exam   Appearance: No acute distress, well-nourished  Head: normocephalic, atraumatic  Eyes: extraocular movements intact, no scleral icterus, no conjunctival injection  Ears, Nose, and Throat: external ears normal, nares patent, moist mucous membranes  Cardiovascular: regular rate and rhythm. no murmurs, rubs, or gallops. no edema  Respiratory: breathing comfortably, symmetric chest rise, clear to auscultation bilaterally. No wheezes, rales, or rhonchi.  Neuro: alert and oriented to time, place, and person. Normal gait  Psych: normal mood and affect     Result Review :   The following data was reviewed by: Louie Jang Jr, MD on 04/18/2022:  Common labs    Common Labsle 5/11/21 5/11/21 7/27/21 7/27/21 7/27/21 7/27/21 1/27/22 1/27/22 1/27/22 1/27/22    0725 0725 1719 1719 1719 1719 1724 1724 1724 1724   Glucose  116 (A)    69   110 (A)    BUN  12    18   13    Creatinine  0.85    0.83   0.84    eGFR  Am      85   84    Sodium  140    143   140    Potassium  4.0    4.1   3.9    Chloride  104    107   103    Calcium  9.8    10.3   10.6 (A)    Albumin      5.20   5.10    Total Bilirubin      0.2   0.3    Alkaline Phosphatase      65   70    AST (SGOT)      18   13    ALT (SGPT)      20   16    WBC 4.79 (A)  5.98    5.84      Hemoglobin 14.4  14.8    14.3      Hematocrit 44.6  46.6    44.2      Platelets 228  250    231      Total Cholesterol     129     129   Triglycerides     99     90   HDL Cholesterol     58     63 (A)   LDL Cholesterol      53     49   Hemoglobin " A1C    6.20 (A)    8.29 (A)     (A) Abnormal value              Lab Results   Component Value Date    INR 0.99 (L) 05/11/2021          Assessment and Plan    Diagnoses and all orders for this visit:    1. Benign essential HTN (Primary)  Comments:  well controlled on current regimen.   Orders:  -     CBC & Differential; Future  -     Comprehensive Metabolic Panel; Future    2. Other hyperlipidemia  Comments:  cont statin. check lipids.   Orders:  -     Comprehensive Metabolic Panel; Future  -     Lipid Panel; Future    3. Chronic systolic congestive heart failure (HCC)  Comments:  cont current regimen. euvolemic today  Orders:  -     CBC & Differential; Future  -     Comprehensive Metabolic Panel; Future    4. Type 2 diabetes mellitus with hyperglycemia, without long-term current use of insulin (HCC)  Comments:  inc turlicity to 4.5mg dosage as 3mg does not seem to be working as hoped. check labs in approx 2-3 weeks.   Orders:  -     Dulaglutide (Trulicity) 4.5 MG/0.5ML solution pen-injector; Inject 0.5 mL under the skin into the appropriate area as directed 1 (One) Time Per Week.  Dispense: 13 pen; Refill: 3  -     Hemoglobin A1c; Future  -     MicroAlbumin, Urine, Random - Urine, Clean Catch; Future    5. Breast cancer screening by mammogram  Comments:  will obtain mammogram results from [facility]    6. DDD (degenerative disc disease), lumbar  Comments:  toradol shot in clinic today. pt to f/u with [facility] for further management.   Orders:  -     ketorolac (TORADOL) injection 60 mg          Medications Discontinued During This Encounter   Medication Reason   • methylPREDNISolone (MEDROL) 4 MG dose pack *Therapy completed   • Dulaglutide (Trulicity) 3 MG/0.5ML solution pen-injector Alternate therapy        Follow Up   Return in about 3 months (around 7/18/2022) for Recheck.  Patient was given instructions and counseling regarding her condition or for health maintenance advice. Please see specific information pulled into  the AVS if appropriate.       Louie Jang Jr, MD  04/18/22  11:48 EDT

## 2022-04-20 NOTE — TELEPHONE ENCOUNTER
I am trying to start the PA  covermymeds isnt working   I am trying surescipts and its not working.

## 2022-04-21 ENCOUNTER — OFFICE VISIT (OUTPATIENT)
Dept: ORTHOPEDIC SURGERY | Facility: CLINIC | Age: 62
End: 2022-04-21

## 2022-04-21 VITALS — WEIGHT: 200 LBS | HEIGHT: 64 IN | BODY MASS INDEX: 34.15 KG/M2

## 2022-04-21 DIAGNOSIS — G56.22 CUBITAL TUNNEL SYNDROME ON LEFT: ICD-10-CM

## 2022-04-21 DIAGNOSIS — M25.522 LEFT ELBOW PAIN: Primary | ICD-10-CM

## 2022-04-21 PROCEDURE — 99203 OFFICE O/P NEW LOW 30 MIN: CPT | Performed by: ORTHOPAEDIC SURGERY

## 2022-04-21 NOTE — TELEPHONE ENCOUNTER
WAS ABLE TO RESTART PA    PA STARTED: 4/21/2022    COVER MY MEDS KEY: CHARISSE    WAITING ON INSURANCE REPLY    Outcome  Approved today  Your PA request has been approved. Additional information will be provided in the approval communication. (Message 1145)  Drug  Farxiga 10MG tablets  Form  [employer] Electronic PA Form (2017 [employer])

## 2022-04-21 NOTE — PROGRESS NOTES
"Chief Complaint  Pain and Initial Evaluation of the Left Elbow     Subjective      Silvia Massey presents to [facility] for evaluation of the left elbow. The patient reports swelling to her elbow. She has no injury or trauma. She reports numbness to the 4th and 5th fingers. She reports numbness happens often at night she has no other complaints. She took a steroids that improved her swelling. She takes tylenol for the swelling.     Allergies   Allergen Reactions   • Metformin Unknown - Low Severity        Social History     Socioeconomic History   • Marital status:    Tobacco Use   • Smoking status: Former Smoker     Packs/day: 0.50     Types: Cigarettes   • Smokeless tobacco: Former User   • Tobacco comment: 25 years ago    Vaping Use   • Vaping Use: Never used   Substance and Sexual Activity   • Alcohol use: Yes     Alcohol/week: 1.0 standard drink     Types: 1 Cans of beer per week     Comment: occasionally drinks, 1 drink per day, has been drinking for 6-10 yrs,1 beer per night    • Drug use: No   • Sexual activity: Defer        Review of Systems     Objective   Vital Signs:   Ht 162.6 cm (64\")   Wt 90.7 kg (200 lb)   BMI 34.33 kg/m²       Physical Exam  Constitutional:       Appearance: Normal appearance. The patient is well-developed and normal weight.   HENT:      Head: Normocephalic.      Right Ear: Hearing and external ear normal.      Left Ear: Hearing and external ear normal.      Nose: Nose normal.   Eyes:      Conjunctiva/sclera: Conjunctivae normal.   Cardiovascular:      Rate and Rhythm: Normal rate.   Pulmonary:      Effort: Pulmonary effort is normal.      Breath sounds: No wheezing or rales.   Abdominal:      Palpations: Abdomen is soft.      Tenderness: There is no abdominal tenderness.   Musculoskeletal:      Cervical back: Normal range of motion.   Skin:     Findings: No rash.   Neurological:      Mental Status: The patient is alert and oriented to " person, place, and time.   Psychiatric:         Mood and Affect: Mood and affect normal.         Judgment: Judgment normal.       Ortho Exam      Left elbow- ROM 0-150 degrees. Mild swelling. Non-tender. Can move fingers and thumb. Stable to varus/valgus stress. Neurovascularly intact. Sensation to light touch median, radial, ulnar nerve. Positive AIN, PIN, ulnar nerve. Positive pulses.     Procedures    X-Ray Report:  Left elbow(s) X-Ray  Indication: Evaluation of the left elbow  AP and Lateral view(s)  Findings: no effusion no fracture. Mild degenerative changes.   Prior studies available for comparison: no         Imaging Results (Most Recent)     Procedure Component Value Units Date/Time    XR Elbow 2 View Left [[id number]] Resulted: 04/21/22 0837     Updated: 04/21/22 0840           Result Review :       No results found.           Assessment and Plan     DX: Cubital tunnel syndrome    Discussed the treatment plan with the patient.  We discussed the option for an EMG. We discussed getting an elbow brace to wear at night. Plan to use tylenol as needed.     Call or return if worsening symptoms.    Follow Up     6 weeks      Patient was given instructions and counseling regarding her condition or for health maintenance advice. Please see specific information pulled into the AVS if appropriate.     Scribed for Arnulfo Armijo MD by Cindi Davey.  04/21/22   09:01 EDT    I have personally performed the services described in this document as scribed by the above individual and it is both accurate and complete. Arnulfo Armijo MD 04/21/22

## 2022-05-03 ENCOUNTER — TELEPHONE (OUTPATIENT)
Dept: INTERNAL MEDICINE | Facility: CLINIC | Age: 62
End: 2022-05-03

## 2022-05-03 NOTE — TELEPHONE ENCOUNTER
Caller: Silvia Massey    Relationship: Self    Best call back number: [phone]    What is the best time to reach you: ANYTIME    Who are you requesting to speak with (clinical staff, provider,  specific staff member): CLINICAL*    What was the call regarding: THE PATIENT STATED SHE WOULD LIKED A CALL BACK TO DISCUSS WHEN HER SHINGLES SHOT WAS AND WHEN THE NEXT IS DUE    Do you require a callback: YES

## 2022-05-08 RX ORDER — DAPAGLIFLOZIN 10 MG/1
TABLET, FILM COATED ORAL
Qty: 90 TABLET | Refills: 3 | Status: SHIPPED | OUTPATIENT
Start: 2022-05-08

## 2022-05-09 RX ORDER — SACUBITRIL AND VALSARTAN 49; 51 MG/1; MG/1
TABLET, FILM COATED ORAL
Qty: 180 TABLET | Refills: 0 | OUTPATIENT
Start: 2022-05-09

## 2022-05-09 NOTE — TELEPHONE ENCOUNTER
LOV 04/16/2021    FOV No follow up visit scheduled    Medication was noted at LOV    Pt needs a appointment.

## 2022-05-11 RX ORDER — SACUBITRIL AND VALSARTAN 49; 51 MG/1; MG/1
1 TABLET, FILM COATED ORAL 2 TIMES DAILY
Qty: 180 TABLET | Refills: 3 | Status: SHIPPED | OUTPATIENT
Start: 2022-05-11 | End: 2022-05-24

## 2022-05-13 ENCOUNTER — TELEPHONE (OUTPATIENT)
Dept: INTERNAL MEDICINE | Facility: CLINIC | Age: 62
End: 2022-05-13

## 2022-05-13 DIAGNOSIS — E61.1 IRON DEFICIENCY: Primary | ICD-10-CM

## 2022-05-13 NOTE — TELEPHONE ENCOUNTER
Caller: Silvia Massey    Relationship: Self    Best call back number: [phone]    What orders are you requesting (i.e. lab or imaging): IRON    In what timeframe would the patient need to come in: ASAP    Where will you receive your lab/imaging services: [facility]    Additional notes: THE PATIENT WOULD LIKE IRON ADDED TO HER LAB ORDERS AND A MESSAGE THROUGH [employer] TO CONFIRM

## 2022-05-16 ENCOUNTER — TELEPHONE (OUTPATIENT)
Dept: INTERNAL MEDICINE | Facility: CLINIC | Age: 62
End: 2022-05-16

## 2022-05-16 ENCOUNTER — OFFICE VISIT (OUTPATIENT)
Dept: CARDIOLOGY | Facility: CLINIC | Age: 62
End: 2022-05-16

## 2022-05-16 VITALS
BODY MASS INDEX: 32.61 KG/M2 | HEIGHT: 64 IN | SYSTOLIC BLOOD PRESSURE: 118 MMHG | HEART RATE: 80 BPM | WEIGHT: 191 LBS | DIASTOLIC BLOOD PRESSURE: 66 MMHG

## 2022-05-16 DIAGNOSIS — E78.5 HYPERLIPIDEMIA LDL GOAL <70: ICD-10-CM

## 2022-05-16 DIAGNOSIS — I25.10 NONOCCLUSIVE CORONARY ATHEROSCLEROSIS OF NATIVE CORONARY ARTERY: Primary | ICD-10-CM

## 2022-05-16 DIAGNOSIS — I42.8 NICM (NONISCHEMIC CARDIOMYOPATHY): ICD-10-CM

## 2022-05-16 DIAGNOSIS — I10 BENIGN ESSENTIAL HTN: ICD-10-CM

## 2022-05-16 PROBLEM — I50.9 CHF (CONGESTIVE HEART FAILURE) (HCC): Status: RESOLVED | Noted: 2021-07-27 | Resolved: 2022-05-16

## 2022-05-16 PROCEDURE — 99214 OFFICE O/P EST MOD 30 MIN: CPT | Performed by: NURSE PRACTITIONER

## 2022-05-16 NOTE — PROGRESS NOTES
Chief Complaint  Hypertension, Congestive Heart Failure, and Hyperlipidemia    Subjective            History of Present Illness  Silvia Massey is a 62-year-old -American female patient who presents to the office today for follow-up.  She has nonocclusive CAD, nonischemic cardiomyopathy, hypertension, and hyperlipidemia.  She denies any chest pain, shortness of breath, lightheadedness/dizziness, palpitations, or edema.  She reports compliance with all of her medications.  She underwent cardiac catheterization on 5/11/2021 which showed minimal coronary atherosclerotic disease, moderately reduced ejection fraction of 30% with global hypokinesis and dyssynchronous contraction pattern, and normal left ventricular filling pressures.    PMH  Past Medical History:   Diagnosis Date   • Anemia    • Arthritis    • CHF (congestive heart failure) (HCC)    • Depression    • Diabetes mellitus (HCC)     type 2   • Dyspnea    • Forgetfulness    • GERD (gastroesophageal reflux disease)    • History of transfusion    • Hypertension    • Night sweats    • Nonischemic cardiomyopathy 5/16/2022   • Nonocclusive CAD 5/16/2022   • Pneumonia    • Sinus trouble    • Sleep apnea    • SOB (shortness of breath)    • Voice hoarseness          ALLERGY  Allergies   Allergen Reactions   • Metformin Unknown - Low Severity          SURGICALHX  Past Surgical History:   Procedure Laterality Date   • BRONCHOSCOPY N/A 10/18/2018    Procedure: BRONCHOSCOPY WITH BIOPSIES, BAL;  Surgeon: Rayray Black MD;  Location[facility] ENDOSCOPY;  Service: Pulmonary   • COLONOSCOPY  2018    wnl    • ENDOSCOPY  2017   • EYE SURGERY  07/09/2019    blepharoplasty   • LYMPHANGIOMA EXCISION     • OOPHORECTOMY     • SINUS SURGERY     • TUBAL ABDOMINAL LIGATION            SOC  Social History     Socioeconomic History   • Marital status:    Tobacco Use   • Smoking status: Former Smoker     Packs/day: 0.50     Types: Cigarettes   • Smokeless tobacco:  Former User   • Tobacco comment: 25 years ago    Vaping Use   • Vaping Use: Never used   Substance and Sexual Activity   • Alcohol use: Yes     Alcohol/week: 1.0 standard drink     Types: 1 Cans of beer per week     Comment: occasionally drinks, 1 drink per day, has been drinking for 6-10 yrs,1 beer per night    • Drug use: No   • Sexual activity: Defer         FAMHX  Family History   Problem Relation Age of Onset   • Cancer Mother    • Heart disease Father    • Diabetes Brother    • Heart disease Brother    • Diabetes Other    • Diabetes Other    • Uterine cancer Other           MEDSIGONLY  Current Outpatient Medications on File Prior to Visit   Medication Sig   • aspirin (aspirin) 81 MG EC tablet Take 1 tablet by mouth Daily.   • baclofen (LIORESAL) 10 MG tablet Take 1 tablet by mouth 3 (Three) Times a Day. (Patient taking differently: Take 10 mg by mouth 3 (Three) Times a Day As Needed.)   • carvedilol (COREG) 6.25 MG tablet Take 1 tablet by mouth 2 (Two) Times a Day.   • Diclofenac Sodium (Voltaren) 1 % gel gel Apply 4 g topically to the appropriate area as directed 4 (Four) Times a Day As Needed (pain).   • Dulaglutide (Trulicity) 4.5 MG/0.5ML solution pen-injector Inject 0.5 mL under the skin into the appropriate area as directed 1 (One) Time Per Week.   • Farxiga 10 MG tablet TAKE 1 TABLET BY MOUTH ONCE DAILY IN THE MORNING FOR 90 DAYS   • gabapentin (NEURONTIN) 600 MG tablet Take 1 tablet by mouth At Night As Needed (pain).   • rosuvastatin (CRESTOR) 10 MG tablet TAKE 1 TABLET BY MOUTH EVERY DAY AT BEDTIME   • sacubitril-valsartan (Entresto) 49-51 MG tablet Take 1 tablet by mouth 2 (Two) Times a Day.   • spironolactone (Aldactone) 25 MG tablet Take 1 tablet by mouth Daily.   • vitamin D (ERGOCALCIFEROL) 1.25 MG (27128 UT) capsule capsule Take 1 capsule by mouth once a week   • [DISCONTINUED] albuterol (PROVENTIL) (2.5 MG/3ML) 0.083% nebulizer solution Take 2.5 mg by nebulization 4 (Four) Times a Day As  "Needed for Wheezing.   • [DISCONTINUED] pantoprazole (PROTONIX) 40 MG EC tablet Take 1 tablet by mouth Daily.     No current facility-administered medications on file prior to visit.         Objective   /66   Pulse 80   Ht 162.6 cm (64\")   Wt 86.6 kg (191 lb)   BMI 32.79 kg/m²       Physical Exam  Constitutional:       Appearance: She is obese.   HENT:      Head: Normocephalic.   Neck:      Vascular: No carotid bruit.   Cardiovascular:      Rate and Rhythm: Normal rate and regular rhythm.      Pulses: Normal pulses.      Heart sounds: Normal heart sounds. No murmur heard.  Pulmonary:      Effort: Pulmonary effort is normal.      Breath sounds: Normal breath sounds.   Musculoskeletal:      Cervical back: Neck supple.      Right lower leg: No edema.      Left lower leg: No edema.   Skin:     General: Skin is dry.      Capillary Refill: Capillary refill takes less than 2 seconds.   Neurological:      Mental Status: She is alert and oriented to person, place, and time.   Psychiatric:         Behavior: Behavior normal.       ECG 12 Lead    Date/Time: 5/18/2022 11:32 AM  Performed by: Cherelle Zavala APRN  Authorized by: Arnulfo Santana MD   Comparison: compared with previous ECG   Comparison to previous ECG: New LBBB  Rhythm: sinus rhythm  Rate: normal  BPM: 86  Conduction: left bundle branch block  ST Segments: ST segments normal  T Waves: T waves normal  QRS axis: normal  Other: no other findings    Clinical impression: abnormal EKG          Result Review :   The following data was reviewed by: GABRIELLA Cain on 05/16/2022:  No results found for: PROBNP  CMP    CMP 1/27/22   Glucose 110 (A)   BUN 13   Creatinine 0.84   eGFR African Am 84   Sodium 140   Potassium 3.9   Chloride 103   Calcium 10.6 (A)   Albumin 5.10   Total Bilirubin 0.3   Alkaline Phosphatase 70   AST (SGOT) 13   ALT (SGPT) 16   (A) Abnormal value            CBC w/diff    CBC w/Diff 1/27/22   WBC 5.84   RBC 5.32 (A)   Hemoglobin " 14.3   Hematocrit 44.2   MCV 83.1   MCH 26.9   MCHC 32.4   RDW 13.3   Platelets 231   Neutrophil Rel % 40.1 (A)   Immature Granulocyte Rel % 0.2   Lymphocyte Rel % 48.8 (A)   Monocyte Rel % 7.5   Eosinophil Rel % 2.4   Basophil Rel % 1.0   (A) Abnormal value             Lab Results   Component Value Date    TSH 1.170 10/23/2019      No results found for: FREET4   No results found for: DDIMERQUANT  No results found for: MG   No results found for: DIGOXIN   No results found for: TROPONINT        Lipid Panel    Lipid Panel 1/27/22   Total Cholesterol 129   Triglycerides 90   HDL Cholesterol 63 (A)   VLDL Cholesterol 17   LDL Cholesterol  49   LDL/HDL Ratio 0.76   (A) Abnormal value               Assessment and Plan    Diagnoses and all orders for this visit:    1. Nonocclusive CAD (Primary)  Currently denies any anginal symptoms, continue aspirin 81 mg daily.    2. Nonischemic cardiomyopathy  Symptomatically stable at this time and euvolemic on exam, continue carvedilol 6.25 mg twice daily, Entresto 49-51 mg twice daily, and spironolactone 25 mg daily.  Repeat echocardiogram to reassess left ventricular systolic function.  -     Adult Transthoracic Echo Complete W/ Cont if Necessary Per Protocol; Future    3. Benign essential HTN  Currently controlled and without adverse effect from medicationcontinue carvedilol 6.25 mg twice daily.    4. Hyperlipidemia LDL goal <70  Last lipid panel was 1/27/2022 with LDL 49 which is within her goal range, continue rosuvastatin 10 mg daily.          Follow Up   Return in about 6 months (around 11/16/2022) for Follow up with Dr Santana.    Patient was given instructions and counseling regarding her condition or for health maintenance advice. Please see specific information pulled into the AVS if appropriate.     Silvia Massey  reports that she has quit smoking. Her smoking use included cigarettes. She smoked 0.50 packs per day. She has quit using smokeless tobacco.         Cherelle RUEDA  GABRIELLA Zavala  05/16/22  14:37 EDT    Dictated Utilizing Dragon Dictation

## 2022-05-18 PROCEDURE — 93000 ELECTROCARDIOGRAM COMPLETE: CPT | Performed by: NURSE PRACTITIONER

## 2022-05-19 ENCOUNTER — LAB (OUTPATIENT)
Dept: LAB | Facility: HOSPITAL | Age: 62
End: 2022-05-19

## 2022-05-19 DIAGNOSIS — E11.65 TYPE 2 DIABETES MELLITUS WITH HYPERGLYCEMIA, WITHOUT LONG-TERM CURRENT USE OF INSULIN: ICD-10-CM

## 2022-05-19 DIAGNOSIS — I10 BENIGN ESSENTIAL HTN: ICD-10-CM

## 2022-05-19 DIAGNOSIS — E78.49 OTHER HYPERLIPIDEMIA: ICD-10-CM

## 2022-05-19 DIAGNOSIS — E61.1 IRON DEFICIENCY: ICD-10-CM

## 2022-05-19 DIAGNOSIS — I50.22 CHRONIC SYSTOLIC CONGESTIVE HEART FAILURE: ICD-10-CM

## 2022-05-19 LAB
ALBUMIN SERPL-MCNC: 4.9 G/DL (ref 3.5–5.2)
ALBUMIN UR-MCNC: 2.1 MG/DL
ALBUMIN/GLOB SERPL: 2.2 G/DL
ALP SERPL-CCNC: 52 U/L (ref 39–117)
ALT SERPL W P-5'-P-CCNC: 22 U/L (ref 1–33)
ANION GAP SERPL CALCULATED.3IONS-SCNC: 14 MMOL/L (ref 5–15)
AST SERPL-CCNC: 20 U/L (ref 1–32)
BASOPHILS # BLD AUTO: 0.07 10*3/MM3 (ref 0–0.2)
BASOPHILS NFR BLD AUTO: 1.6 % (ref 0–1.5)
BILIRUB SERPL-MCNC: 0.3 MG/DL (ref 0–1.2)
BUN SERPL-MCNC: 15 MG/DL (ref 8–23)
BUN/CREAT SERPL: 19.5 (ref 7–25)
CALCIUM SPEC-SCNC: 9.9 MG/DL (ref 8.6–10.5)
CHLORIDE SERPL-SCNC: 107 MMOL/L (ref 98–107)
CHOLEST SERPL-MCNC: 85 MG/DL (ref 0–200)
CO2 SERPL-SCNC: 23 MMOL/L (ref 22–29)
CREAT SERPL-MCNC: 0.77 MG/DL (ref 0.57–1)
DEPRECATED RDW RBC AUTO: 38.5 FL (ref 37–54)
EGFRCR SERPLBLD CKD-EPI 2021: 87.3 ML/MIN/1.73
EOSINOPHIL # BLD AUTO: 0.09 10*3/MM3 (ref 0–0.4)
EOSINOPHIL NFR BLD AUTO: 2 % (ref 0.3–6.2)
ERYTHROCYTE [DISTWIDTH] IN BLOOD BY AUTOMATED COUNT: 13.2 % (ref 12.3–15.4)
FERRITIN SERPL-MCNC: 257 NG/ML (ref 13–150)
GLOBULIN UR ELPH-MCNC: 2.2 GM/DL
GLUCOSE SERPL-MCNC: 89 MG/DL (ref 65–99)
HBA1C MFR BLD: 7.5 % (ref 4.8–5.6)
HCT VFR BLD AUTO: 41.6 % (ref 34–46.6)
HDLC SERPL-MCNC: 40 MG/DL (ref 40–60)
HGB BLD-MCNC: 13.8 G/DL (ref 12–15.9)
IMM GRANULOCYTES # BLD AUTO: 0.01 10*3/MM3 (ref 0–0.05)
IMM GRANULOCYTES NFR BLD AUTO: 0.2 % (ref 0–0.5)
IRON 24H UR-MRATE: 71 MCG/DL (ref 37–145)
IRON SATN MFR SERPL: 18 % (ref 20–50)
LDLC SERPL CALC-MCNC: 29 MG/DL (ref 0–100)
LDLC/HDLC SERPL: 0.77 {RATIO}
LYMPHOCYTES # BLD AUTO: 1.98 10*3/MM3 (ref 0.7–3.1)
LYMPHOCYTES NFR BLD AUTO: 44 % (ref 19.6–45.3)
MCH RBC QN AUTO: 27.2 PG (ref 26.6–33)
MCHC RBC AUTO-ENTMCNC: 33.2 G/DL (ref 31.5–35.7)
MCV RBC AUTO: 81.9 FL (ref 79–97)
MONOCYTES # BLD AUTO: 0.33 10*3/MM3 (ref 0.1–0.9)
MONOCYTES NFR BLD AUTO: 7.3 % (ref 5–12)
NEUTROPHILS NFR BLD AUTO: 2.02 10*3/MM3 (ref 1.7–7)
NEUTROPHILS NFR BLD AUTO: 44.9 % (ref 42.7–76)
NRBC BLD AUTO-RTO: 0 /100 WBC (ref 0–0.2)
PLATELET # BLD AUTO: 248 10*3/MM3 (ref 140–450)
PMV BLD AUTO: 11.5 FL (ref 6–12)
POTASSIUM SERPL-SCNC: 4 MMOL/L (ref 3.5–5.2)
PROT SERPL-MCNC: 7.1 G/DL (ref 6–8.5)
RBC # BLD AUTO: 5.08 10*6/MM3 (ref 3.77–5.28)
SODIUM SERPL-SCNC: 144 MMOL/L (ref 136–145)
TIBC SERPL-MCNC: 395 MCG/DL (ref 298–536)
TRANSFERRIN SERPL-MCNC: 265 MG/DL (ref 200–360)
TRIGL SERPL-MCNC: 72 MG/DL (ref 0–150)
VLDLC SERPL-MCNC: 16 MG/DL (ref 5–40)
WBC NRBC COR # BLD: 4.5 10*3/MM3 (ref 3.4–10.8)

## 2022-05-19 PROCEDURE — 85025 COMPLETE CBC W/AUTO DIFF WBC: CPT

## 2022-05-19 PROCEDURE — 83540 ASSAY OF IRON: CPT

## 2022-05-19 PROCEDURE — 80053 COMPREHEN METABOLIC PANEL: CPT

## 2022-05-19 PROCEDURE — 36415 COLL VENOUS BLD VENIPUNCTURE: CPT

## 2022-05-19 PROCEDURE — 84466 ASSAY OF TRANSFERRIN: CPT

## 2022-05-19 PROCEDURE — 80061 LIPID PANEL: CPT

## 2022-05-19 PROCEDURE — 82728 ASSAY OF FERRITIN: CPT

## 2022-05-19 PROCEDURE — 82043 UR ALBUMIN QUANTITATIVE: CPT

## 2022-05-19 PROCEDURE — 83036 HEMOGLOBIN GLYCOSYLATED A1C: CPT

## 2022-05-20 ENCOUNTER — TELEPHONE (OUTPATIENT)
Dept: INTERNAL MEDICINE | Facility: CLINIC | Age: 62
End: 2022-05-20

## 2022-05-20 NOTE — TELEPHONE ENCOUNTER
Caller: Silvia Massey    Relationship: Self    Best call back number: [phone]    Caller requesting test results: YES     What test was performed: LABS     When was the test performed: YESTERDAY, Thursday, MAY 19, 2022    Where was the test performed: [facility]

## 2022-05-24 ENCOUNTER — TELEPHONE (OUTPATIENT)
Dept: CARDIOLOGY | Facility: CLINIC | Age: 62
End: 2022-05-24

## 2022-05-24 DIAGNOSIS — I42.8 NICM (NONISCHEMIC CARDIOMYOPATHY): Primary | ICD-10-CM

## 2022-05-24 RX ORDER — SACUBITRIL AND VALSARTAN 97; 103 MG/1; MG/1
1 TABLET, FILM COATED ORAL 2 TIMES DAILY
Qty: 180 TABLET | Refills: 3 | Status: SHIPPED | OUTPATIENT
Start: 2022-05-24

## 2022-05-24 NOTE — TELEPHONE ENCOUNTER
----- Message from GABRIELLA Kim sent at 5/23/2022  3:55 PM EDT -----  Notify pt echo result: EF 36-40%, no change from previous  Increase entresto to  mg BID  Check BMP in 1 month  Keep December follow up

## 2022-05-24 NOTE — TELEPHONE ENCOUNTER
JENNIFFER patient regarding results and recommendations. Patient voiced understanding. Patient states she just got a  Month supply of Entresto 49-51. Patient states she cannot afford to get new prescription at this time. Patient with question if she can just take 2 in AM and 2 PM to use her supply then get the  dose.       SW Cherelle per Cherelle patient can take 2 in AM 2 Pm to use up supply then get new dose.    Patient notified.

## 2022-06-13 RX ORDER — ROSUVASTATIN CALCIUM 10 MG/1
TABLET, COATED ORAL
Qty: 90 TABLET | Refills: 0 | Status: SHIPPED | OUTPATIENT
Start: 2022-06-13 | End: 2022-12-19

## 2022-06-15 ENCOUNTER — OFFICE VISIT (OUTPATIENT)
Dept: NEUROSURGERY | Facility: CLINIC | Age: 62
End: 2022-06-15

## 2022-06-15 VITALS
DIASTOLIC BLOOD PRESSURE: 67 MMHG | HEART RATE: 88 BPM | HEIGHT: 64 IN | BODY MASS INDEX: 31.21 KG/M2 | WEIGHT: 182.8 LBS | SYSTOLIC BLOOD PRESSURE: 115 MMHG

## 2022-06-15 DIAGNOSIS — M47.816 FACET ARTHRITIS OF LUMBAR REGION: ICD-10-CM

## 2022-06-15 DIAGNOSIS — M54.41 CHRONIC MIDLINE LOW BACK PAIN WITH BILATERAL SCIATICA: ICD-10-CM

## 2022-06-15 DIAGNOSIS — M51.36 DDD (DEGENERATIVE DISC DISEASE), LUMBAR: Primary | ICD-10-CM

## 2022-06-15 DIAGNOSIS — G89.29 CHRONIC MIDLINE LOW BACK PAIN WITH BILATERAL SCIATICA: ICD-10-CM

## 2022-06-15 DIAGNOSIS — M54.42 CHRONIC MIDLINE LOW BACK PAIN WITH BILATERAL SCIATICA: ICD-10-CM

## 2022-06-15 PROCEDURE — 99204 OFFICE O/P NEW MOD 45 MIN: CPT | Performed by: PHYSICIAN ASSISTANT

## 2022-06-15 NOTE — PROGRESS NOTES
"Chief Complaint  Back Pain, Numbness (Bilateral legs), and Tingling (Bilateral legs)    Subjective          Silvia Massey who is a 62 y.o. year old female who presents to [facility] for Evaluation of the Spine.     The patient complains of pain located in the Lumbar Spine.  Patients states the pain has been present for 6 months.  The pain came on aggressively.  The pain scaled level is 8.  The pain does radiate. Dermatomes are located bilaterally into the leg and the big toes on both feet.  The pain is constant and waxing/waning and described as spasms.  The pain is worse at no particular time of day. Patient states Heat and TENS unit makes the pain better.  Patient states changing from a seated or standing position, going to sleep makes the pain worse.    Associated Symptoms Include: Numbness and Tingling in the legs and toes of the feet, especially the big toes.  She denies any weakness. She denies loss of bowel or bladder control.  Conservative Interventions Include: Physical Therapy that was ineffective., NSAIDs that were ineffective., Gabapentin that was ineffective. and Muscle Relaxants that were ineffective.    Was this the result of an injury or accident?: No    History of Previous Spinal Surgery?: No     reports that she has quit smoking. Her smoking use included cigarettes. She smoked 0.50 packs per day. She has quit using smokeless tobacco.    Review of Systems   Musculoskeletal: Positive for back pain.   Neurological: Positive for numbness.        Objective   Vital Signs:   /67   Pulse 88   Ht 162.6 cm (64\")   Wt 82.9 kg (182 lb 12.8 oz)   BMI 31.38 kg/m²       Physical Exam  Constitutional:       Appearance: Normal appearance. She is obese.   Pulmonary:      Effort: Pulmonary effort is normal.   Musculoskeletal:         General: Tenderness (midline lumbar spine, right SI joint area) present.      Comments: SLR negative bilaterally   Neurological: "      General: No focal deficit present.      Mental Status: She is alert and oriented to person, place, and time.      Sensory: No sensory deficit.      Motor: No weakness.      Deep Tendon Reflexes: Reflexes normal.   Psychiatric:         Mood and Affect: Mood normal.         Behavior: Behavior normal.        Neurologic Exam     Mental Status   Oriented to person, place, and time.        Result Review     I personally viewed the MRI of the lumbar spine without contrast from 4/17/2022 which shows multilevel degenerative disc disease and facet arthritis, L1-L2 there is mild left foraminal stenosis, L2-L3 there is mild disc bulging without canal or foraminal stenosis, L3-L4 there is mild bilateral foraminal stenosis worse on the left, at L4-L5 there is mild disc bulging without canal or foraminal stenosis.     Assessment and Plan    Diagnoses and all orders for this visit:    1. DDD (degenerative disc disease), lumbar (Primary)  -     Ambulatory Referral to Pain Management    2. Facet arthritis of lumbar region  -     Ambulatory Referral to Pain Management    3. Chronic midline low back pain with bilateral sciatica  -     Ambulatory Referral to Pain Management    Her pain seems to be in an L4 dermatome bilaterally, but her back pain is worse.    She does have some multilevel degenerative changes, but no significant stenosis in the lumbar spine. I don't expect surgery to help.    She could consider a trial of physical therapy, but has done this in the past without benefit and is going to defer today.    She could consider LESB or MBBs/RFA in the lumbar spine. I will refer her to [facility] in [place] to consult for this.    The patient was counseled on basic recommendations for the reduction and prevention of back, neck, or spine pain in association with spinal disorders, including: cessation/avoidance of nicotine use, maintenance of a healthy BMI and weight, focusing on building/maintaining core strength through core  exercise, and avoidance of activities which worsen the pain. The patient will monitor for changes in symptoms and notify our clinic of these changes as needed.    She will follow-up here PRN for failure to improve or worsening symptoms.    Follow Up   Return if symptoms worsen or fail to improve.  Patient was given instructions and counseling regarding her condition or for health maintenance advice. Please see specific information pulled into the AVS if appropriate.

## 2022-06-27 ENCOUNTER — OFFICE VISIT (OUTPATIENT)
Dept: INTERNAL MEDICINE | Facility: CLINIC | Age: 62
End: 2022-06-27

## 2022-06-27 VITALS
DIASTOLIC BLOOD PRESSURE: 75 MMHG | BODY MASS INDEX: 31.31 KG/M2 | TEMPERATURE: 96.4 F | SYSTOLIC BLOOD PRESSURE: 118 MMHG | HEART RATE: 82 BPM | HEIGHT: 64 IN | OXYGEN SATURATION: 97 % | WEIGHT: 183.4 LBS

## 2022-06-27 DIAGNOSIS — L98.9 SKIN LESION: ICD-10-CM

## 2022-06-27 DIAGNOSIS — E78.5 HYPERLIPIDEMIA LDL GOAL <70: ICD-10-CM

## 2022-06-27 DIAGNOSIS — L29.9 PRURITUS: Primary | ICD-10-CM

## 2022-06-27 DIAGNOSIS — E11.65 TYPE 2 DIABETES MELLITUS WITH HYPERGLYCEMIA, WITHOUT LONG-TERM CURRENT USE OF INSULIN: ICD-10-CM

## 2022-06-27 DIAGNOSIS — I10 BENIGN ESSENTIAL HTN: ICD-10-CM

## 2022-06-27 PROCEDURE — 99214 OFFICE O/P EST MOD 30 MIN: CPT | Performed by: INTERNAL MEDICINE

## 2022-06-27 RX ORDER — SPIRONOLACTONE 25 MG/1
TABLET ORAL
Qty: 90 TABLET | Refills: 3 | Status: SHIPPED | OUTPATIENT
Start: 2022-06-27 | End: 2022-06-27 | Stop reason: SDUPTHER

## 2022-06-27 RX ORDER — SPIRONOLACTONE 25 MG/1
25 TABLET ORAL DAILY
Qty: 90 TABLET | Refills: 3 | Status: SHIPPED | OUTPATIENT
Start: 2022-06-27

## 2022-06-27 NOTE — TELEPHONE ENCOUNTER
Drug-Drug: Entresto and spironolactone  The risk of hyperkalemia may be increased when potassium-sparing diuretics are co-administered with angiotensin II receptor antagonists.

## 2022-06-27 NOTE — TELEPHONE ENCOUNTER
Diuretics Protocol Passed 06/27/2022 05:47 AM   Protocol Details  No active pregnancy on record    Normal serum potassium in past 12 months    Normal serum sodium in past 12 months    No positive pregnancy test in past 12 months    Recent or future visit with authorizing provider    Blood pressure on record    GFR> 30 ml/min in past year

## 2022-06-27 NOTE — PROGRESS NOTES
"Chief Complaint  Rash (On neck and arms for about 2 weeks)    Subjective          Silvia Massey presents to [facility]  History of Present Illness  Patient has skin lesions thought related to bug bites. They do not seemingly heal well or quickly. Patient has been using calamine lotion and peroxide without much relief. Patient reports persistent itching.   DM2- patient has not started trulicity 4.5mg weekly just yet. Patient is losing weight. Patient reports eating much healthier than previously.   hyperlipidemia- doing well on statin and aspirin.     Current Outpatient Medications   Medication Instructions   • aspirin 81 mg, Oral, Daily   • baclofen (LIORESAL) 10 mg, Oral, 3 Times Daily   • carvedilol (COREG) 6.25 mg, Oral, 2 Times Daily   • Diclofenac Sodium (VOLTAREN) 4 g, Topical, 4 Times Daily PRN   • Farxiga 10 MG tablet TAKE 1 TABLET BY MOUTH ONCE DAILY IN THE MORNING FOR 90 DAYS   • gabapentin (NEURONTIN) 600 mg, Oral, Nightly PRN   • rosuvastatin (CRESTOR) 10 MG tablet TAKE 1 TABLET BY MOUTH EVERY DAY AT BEDTIME   • sacubitril-valsartan (Entresto)  MG tablet 1 tablet, Oral, 2 Times Daily   • spironolactone (ALDACTONE) 25 mg, Oral, Daily   • triamcinolone (KENALOG) 0.1 % ointment 1 application, Topical, 2 Times Daily   • Trulicity 4.5 mg, Subcutaneous, Weekly   • vitamin D (ERGOCALCIFEROL) 1.25 MG (72653 UT) capsule capsule Take 1 capsule by mouth once a week       The following portions of the patient's history were reviewed and updated as appropriate: allergies, current medications, past family history, past medical history, past social history, past surgical history, and problem list.    Objective   Vital Signs:   /75   Pulse 82   Temp 96.4 °F (35.8 °C) (Temporal)   Ht 162.6 cm (64\")   Wt 83.2 kg (183 lb 6.4 oz)   SpO2 97%   BMI 31.48 kg/m²     Wt Readings from Last 3 Encounters:   06/27/22 83.2 kg (183 lb 6.4 oz)   06/15/22 82.9 kg (182 " lb 12.8 oz)   05/16/22 86.6 kg (191 lb)     BP Readings from Last 3 Encounters:   06/27/22 118/75   06/15/22 115/67   05/16/22 118/66     Physical Exam   Appearance: No acute distress, well-nourished  Head: normocephalic, atraumatic  Eyes: extraocular movements intact, no scleral icterus, no conjunctival injection  Ears, Nose, and Throat: external ears normal, nares patent, moist mucous membranes  Cardiovascular: regular rate and rhythm. no murmurs, rubs, or gallops. no edema  Respiratory: breathing comfortably, symmetric chest rise, clear to auscultation bilaterally. No wheezes, rales, or rhonchi.  Neuro: alert and oriented to time, place, and person. Normal gait  Psych: normal mood and affect     Result Review :   The following data was reviewed by: Louie Jang Jr, MD on 06/27/2022:  Common labs    Common Labsle 7/27/21 7/27/21 7/27/21 7/27/21 1/27/22 1/27/22 1/27/22 1/27/22 5/19/22 5/19/22 5/19/22 5/19/22 5/19/22    1719 1719 1719 1719 1724 1724 1724 1724 1131 1131 1131 1131 1131   Glucose    69   110 (A)   89      BUN    18   13   15      Creatinine    0.83   0.84   0.77      eGFR  Am    85   84         Sodium    143   140   144      Potassium    4.1   3.9   4.0      Chloride    107   103   107      Calcium    10.3   10.6 (A)   9.9      Albumin    5.20   5.10   4.90      Total Bilirubin    0.2   0.3   0.3      Alkaline Phosphatase    65   70   52      AST (SGOT)    18   13   20      ALT (SGPT)    20   16   22      WBC 5.98    5.84    4.50       Hemoglobin 14.8    14.3    13.8       Hematocrit 46.6    44.2    41.6       Platelets 250    231    248       Total Cholesterol   129     129   85     Triglycerides   99     90   72     HDL Cholesterol   58     63 (A)   40     LDL Cholesterol    53     49   29     Hemoglobin A1C  6.20 (A)    8.29 (A)      7.50 (A)    Microalbumin, Urine             2.1   (A) Abnormal value              Lab Results   Component Value Date    INR 0.99 (L) 05/11/2021           Assessment and Plan    Diagnoses and all orders for this visit:    1. Pruritus (Primary)  -     triamcinolone (KENALOG) 0.1 % ointment; Apply 1 application topically to the appropriate area as directed 2 (Two) Times a Day.  Dispense: 80 g; Refill: 1  -     Ambulatory Referral to Dermatology    2. Skin lesion  -     triamcinolone (KENALOG) 0.1 % ointment; Apply 1 application topically to the appropriate area as directed 2 (Two) Times a Day.  Dispense: 80 g; Refill: 1  -     Ambulatory Referral to Dermatology    3. Benign essential HTN  -     spironolactone (ALDACTONE) 25 MG tablet; Take 1 tablet by mouth Daily.  Dispense: 90 tablet; Refill: 3    4. Type 2 diabetes mellitus with hyperglycemia, without long-term current use of insulin (HCC)  Comments:  encouraged by weight loss. pt does not need to inc trulicity to 4.5mg dosage. cont reigmen.     5. Hyperlipidemia LDL goal <70  Comments:  cont statin and ASA        Medications Discontinued During This Encounter   Medication Reason   • spironolactone (ALDACTONE) 25 MG tablet Reorder        Follow Up   Return in about 2 months (around 8/27/2022) for Recheck.  Patient was given instructions and counseling regarding her condition or for health maintenance advice. Please see specific information pulled into the AVS if appropriate.       Louie Jang Jr, MD  06/27/22  10:51 EDT

## 2022-06-28 ENCOUNTER — PATIENT ROUNDING (BHMG ONLY) (OUTPATIENT)
Dept: INTERNAL MEDICINE | Facility: CLINIC | Age: 62
End: 2022-06-28

## 2022-07-11 RX ORDER — ERGOCALCIFEROL 1.25 MG/1
CAPSULE ORAL
Qty: 13 CAPSULE | Refills: 1 | Status: SHIPPED | OUTPATIENT
Start: 2022-07-11 | End: 2022-12-30 | Stop reason: SDUPTHER

## 2022-07-11 RX ORDER — HYDROGEN PEROXIDE 2.65 ML/100ML
LIQUID ORAL; TOPICAL
Qty: 90 TABLET | Refills: 3 | Status: SHIPPED | OUTPATIENT
Start: 2022-07-11 | End: 2023-01-26 | Stop reason: SDUPTHER

## 2022-07-13 LAB
ARTICHOKE IGE QN: 52 MG/DL (ref 0–100)
CREATININE: 0.81
HBA1C MFR BLD: 6.3 % (ref 4.8–5.6)
Lab: 0.99

## 2022-08-05 ENCOUNTER — PATIENT MESSAGE (OUTPATIENT)
Dept: INTERNAL MEDICINE | Facility: CLINIC | Age: 62
End: 2022-08-05

## 2022-08-05 NOTE — TELEPHONE ENCOUNTER
From: Silvia Massey  To: Louie Jang Jr, MD  Sent: 8/5/2022 9:30 AM EDT  Subject: Probiotic multi enzyme    Good morning Dr. Jang, Will it be ok for me to take these probiotics? I understand it helps with bloating and regular movements without any cramping.  
I have personally performed a face to face diagnostic evaluation on this patient. I have reviewed the ACP note and agree with the history, exam and plan of care, except as noted.

## 2022-08-08 ENCOUNTER — TELEPHONE (OUTPATIENT)
Dept: CARDIOLOGY | Facility: CLINIC | Age: 62
End: 2022-08-08

## 2022-08-08 NOTE — TELEPHONE ENCOUNTER
I sent patient a message regarding BMP that is overdue. I received this message in return. I see a previous message from May regarding her Entresto increase. Does she need to wait 1 month from now before having the BMP drawn?

## 2022-08-08 NOTE — TELEPHONE ENCOUNTER
----- Message from Silvia Massey sent at 8/8/2022  2:11 PM EDT -----  Regarding: Overdue Labs  I did not change the dosage of the Entresto.  I just picked up the new prescription today.

## 2022-08-11 ENCOUNTER — TRANSCRIBE ORDERS (OUTPATIENT)
Dept: GENERAL RADIOLOGY | Facility: HOSPITAL | Age: 62
End: 2022-08-11

## 2022-08-11 ENCOUNTER — HOSPITAL ENCOUNTER (OUTPATIENT)
Dept: GENERAL RADIOLOGY | Facility: HOSPITAL | Age: 62
Discharge: HOME | End: 2022-08-11
Admitting: NURSE PRACTITIONER

## 2022-08-11 DIAGNOSIS — R52 PAIN: Primary | ICD-10-CM

## 2022-08-11 DIAGNOSIS — R52 PAIN: ICD-10-CM

## 2022-08-11 PROCEDURE — 73522 X-RAY EXAM HIPS BI 3-4 VIEWS: CPT

## 2022-08-26 ENCOUNTER — OFFICE VISIT (OUTPATIENT)
Dept: INTERNAL MEDICINE | Facility: CLINIC | Age: 62
End: 2022-08-26

## 2022-08-26 VITALS
HEIGHT: 64 IN | BODY MASS INDEX: 30.42 KG/M2 | HEART RATE: 79 BPM | DIASTOLIC BLOOD PRESSURE: 74 MMHG | WEIGHT: 178.2 LBS | OXYGEN SATURATION: 97 % | SYSTOLIC BLOOD PRESSURE: 121 MMHG | TEMPERATURE: 98.4 F

## 2022-08-26 DIAGNOSIS — E11.65 TYPE 2 DIABETES MELLITUS WITH HYPERGLYCEMIA, WITHOUT LONG-TERM CURRENT USE OF INSULIN: ICD-10-CM

## 2022-08-26 DIAGNOSIS — I42.8 NICM (NONISCHEMIC CARDIOMYOPATHY): ICD-10-CM

## 2022-08-26 DIAGNOSIS — I10 BENIGN ESSENTIAL HTN: Primary | ICD-10-CM

## 2022-08-26 DIAGNOSIS — E78.5 HYPERLIPIDEMIA LDL GOAL <70: ICD-10-CM

## 2022-08-26 LAB
ALBUMIN SERPL-MCNC: 4.7 G/DL (ref 3.5–5.2)
ALBUMIN/GLOB SERPL: 2.2 G/DL
ALP SERPL-CCNC: 47 U/L (ref 39–117)
ALT SERPL W P-5'-P-CCNC: 16 U/L (ref 1–33)
ANION GAP SERPL CALCULATED.3IONS-SCNC: 12 MMOL/L (ref 5–15)
AST SERPL-CCNC: 19 U/L (ref 1–32)
BASOPHILS # BLD AUTO: 0.05 10*3/MM3 (ref 0–0.2)
BASOPHILS NFR BLD AUTO: 1.1 % (ref 0–1.5)
BILIRUB SERPL-MCNC: 0.3 MG/DL (ref 0–1.2)
BUN SERPL-MCNC: 24 MG/DL (ref 8–23)
BUN/CREAT SERPL: 30.8 (ref 7–25)
CALCIUM SPEC-SCNC: 10.2 MG/DL (ref 8.6–10.5)
CHLORIDE SERPL-SCNC: 108 MMOL/L (ref 98–107)
CHOLEST SERPL-MCNC: 128 MG/DL (ref 0–200)
CO2 SERPL-SCNC: 22 MMOL/L (ref 22–29)
CREAT SERPL-MCNC: 0.78 MG/DL (ref 0.57–1)
DEPRECATED RDW RBC AUTO: 40.4 FL (ref 37–54)
EGFRCR SERPLBLD CKD-EPI 2021: 86 ML/MIN/1.73
EOSINOPHIL # BLD AUTO: 0.09 10*3/MM3 (ref 0–0.4)
EOSINOPHIL NFR BLD AUTO: 1.9 % (ref 0.3–6.2)
ERYTHROCYTE [DISTWIDTH] IN BLOOD BY AUTOMATED COUNT: 13.7 % (ref 12.3–15.4)
GLOBULIN UR ELPH-MCNC: 2.1 GM/DL
GLUCOSE SERPL-MCNC: 99 MG/DL (ref 65–99)
HBA1C MFR BLD: 6.1 % (ref 4.8–5.6)
HCT VFR BLD AUTO: 42 % (ref 34–46.6)
HDLC SERPL-MCNC: 68 MG/DL (ref 40–60)
HGB BLD-MCNC: 13.8 G/DL (ref 12–15.9)
IMM GRANULOCYTES # BLD AUTO: 0.01 10*3/MM3 (ref 0–0.05)
IMM GRANULOCYTES NFR BLD AUTO: 0.2 % (ref 0–0.5)
LDLC SERPL CALC-MCNC: 47 MG/DL (ref 0–100)
LDLC/HDLC SERPL: 0.71 {RATIO}
LYMPHOCYTES # BLD AUTO: 1.66 10*3/MM3 (ref 0.7–3.1)
LYMPHOCYTES NFR BLD AUTO: 35.8 % (ref 19.6–45.3)
MCH RBC QN AUTO: 27 PG (ref 26.6–33)
MCHC RBC AUTO-ENTMCNC: 32.9 G/DL (ref 31.5–35.7)
MCV RBC AUTO: 82.2 FL (ref 79–97)
MONOCYTES # BLD AUTO: 0.28 10*3/MM3 (ref 0.1–0.9)
MONOCYTES NFR BLD AUTO: 6 % (ref 5–12)
NEUTROPHILS NFR BLD AUTO: 2.55 10*3/MM3 (ref 1.7–7)
NEUTROPHILS NFR BLD AUTO: 55 % (ref 42.7–76)
NRBC BLD AUTO-RTO: 0 /100 WBC (ref 0–0.2)
PLATELET # BLD AUTO: 214 10*3/MM3 (ref 140–450)
PMV BLD AUTO: 11 FL (ref 6–12)
POTASSIUM SERPL-SCNC: 4.2 MMOL/L (ref 3.5–5.2)
PROT SERPL-MCNC: 6.8 G/DL (ref 6–8.5)
RBC # BLD AUTO: 5.11 10*6/MM3 (ref 3.77–5.28)
SODIUM SERPL-SCNC: 142 MMOL/L (ref 136–145)
TRIGL SERPL-MCNC: 60 MG/DL (ref 0–150)
VLDLC SERPL-MCNC: 13 MG/DL (ref 5–40)
WBC NRBC COR # BLD: 4.64 10*3/MM3 (ref 3.4–10.8)

## 2022-08-26 PROCEDURE — 80053 COMPREHEN METABOLIC PANEL: CPT | Performed by: INTERNAL MEDICINE

## 2022-08-26 PROCEDURE — 99214 OFFICE O/P EST MOD 30 MIN: CPT | Performed by: INTERNAL MEDICINE

## 2022-08-26 PROCEDURE — 83036 HEMOGLOBIN GLYCOSYLATED A1C: CPT | Performed by: INTERNAL MEDICINE

## 2022-08-26 PROCEDURE — 80061 LIPID PANEL: CPT | Performed by: INTERNAL MEDICINE

## 2022-08-26 PROCEDURE — 85025 COMPLETE CBC W/AUTO DIFF WBC: CPT | Performed by: INTERNAL MEDICINE

## 2022-08-26 RX ORDER — OXYCODONE HYDROCHLORIDE AND ACETAMINOPHEN 5; 325 MG/1; MG/1
TABLET ORAL
COMMUNITY
Start: 2022-08-01

## 2022-08-26 RX ORDER — BETAMETHASONE DIPROPIONATE 0.5 MG/G
LOTION TOPICAL
COMMUNITY
Start: 2022-08-15

## 2022-08-26 NOTE — PROGRESS NOTES
Chief Complaint  Follow-up (2 follow up for rash. Went to dermatology for the rash. )    Subjective          Silvia Massey presents to [facility]  History of Present Illness  hypertension- patient denies Has, dizziness, chest pain.   hyperlipidemia- doing well on statin and aspirin.   DM2- patient is exercising more often. Patient has cut back on carb intake as well. Patient is losing weight  CHF- follow-up with cardiology since last visit. Patient reports dose adjustment on entresto.     Current Outpatient Medications   Medication Instructions   • baclofen (LIORESAL) 10 mg, Oral, 3 Times Daily   • betamethasone dipropionate (DIPROLENE) 0.05 % lotion APPLY TO THE AFFECTED AREA ON THE BODY TWICE A DAY AS NEEDED FOR ITCHING UNTIL CLEAR   • carvedilol (COREG) 6.25 mg, Oral, 2 Times Daily   • Diclofenac Sodium (VOLTAREN) 4 g, Topical, 4 Times Daily PRN   • EQ Aspirin Adult Low Dose 81 MG EC tablet Take 1 tablet by mouth once daily   • Farxiga 10 MG tablet TAKE 1 TABLET BY MOUTH ONCE DAILY IN THE MORNING FOR 90 DAYS   • gabapentin (NEURONTIN) 600 mg, Oral, Nightly PRN   • oxyCODONE-acetaminophen (PERCOCET) 5-325 MG per tablet No dose, route, or frequency recorded.   • rosuvastatin (CRESTOR) 10 MG tablet TAKE 1 TABLET BY MOUTH EVERY DAY AT BEDTIME   • sacubitril-valsartan (Entresto)  MG tablet 1 tablet, Oral, 2 Times Daily   • spironolactone (ALDACTONE) 25 mg, Oral, Daily   • triamcinolone (KENALOG) 0.1 % ointment 1 application, Topical, 2 Times Daily   • Trulicity 4.5 mg, Subcutaneous, Weekly   • vitamin D (ERGOCALCIFEROL) 1.25 MG (56109 UT) capsule capsule Take 1 capsule by mouth once a week       The following portions of the patient's history were reviewed and updated as appropriate: allergies, current medications, past family history, past medical history, past social history, past surgical history, and problem list.    Objective   Vital Signs:   /74 (BP  "Location: Right arm, Patient Position: Sitting, Cuff Size: Adult)   Pulse 79   Temp 98.4 °F (36.9 °C)   Ht 162.6 cm (64\")   Wt 80.8 kg (178 lb 3.2 oz)   SpO2 97%   BMI 30.59 kg/m²     Wt Readings from Last 3 Encounters:   08/26/22 80.8 kg (178 lb 3.2 oz)   06/27/22 83.2 kg (183 lb 6.4 oz)   06/15/22 82.9 kg (182 lb 12.8 oz)     BP Readings from Last 3 Encounters:   08/26/22 121/74   06/27/22 118/75   06/15/22 115/67     Physical Exam   Appearance: No acute distress, well-nourished  Head: normocephalic, atraumatic  Eyes: extraocular movements intact, no scleral icterus, no conjunctival injection  Ears, Nose, and Throat: external ears normal, nares patent, moist mucous membranes  Cardiovascular: regular rate and rhythm. no murmurs, rubs, or gallops. no edema  Respiratory: breathing comfortably, symmetric chest rise, clear to auscultation bilaterally. No wheezes, rales, or rhonchi.  Neuro: alert and oriented to time, place, and person. Normal gait  Psych: normal mood and affect     Result Review :   The following data was reviewed by: Louie Jang Jr, MD on 08/26/2022:  Common labs    Common Labsle 1/27/22 1/27/22 1/27/22 1/27/22 5/19/22 5/19/22 5/19/22 5/19/22 5/19/22 6/27/22 6/27/22    1724 1724 1724 1724 1131 1131 1131 1131 1131 0000 0000   Glucose   110 (A)   89        BUN   13   15        Creatinine   0.84   0.77        eGFR  Am   84           Sodium   140   144        Potassium   3.9   4.0        Chloride   103   107        Calcium   10.6 (A)   9.9        Albumin   5.10   4.90        Total Bilirubin   0.3   0.3        Alkaline Phosphatase   70   52        AST (SGOT)   13   20        ALT (SGPT)   16   22        WBC 5.84    4.50         Hemoglobin 14.3    13.8         Hematocrit 44.2    41.6         Platelets 231    248         Total Cholesterol    129   85       Triglycerides    90   72       HDL Cholesterol    63 (A)   40       LDL Cholesterol     49   29    52   Hemoglobin A1C  8.29 (A)   "    7.50 (A)  6.30 (A)    Microalbumin, Urine         2.1     (A) Abnormal value            Lab Results   Component Value Date    INR 0.99 (L) 05/11/2021          Assessment and Plan    Diagnoses and all orders for this visit:    1. Benign essential HTN (Primary)  Comments:  well controlled on regimen including ARB with concurrent DM  Orders:  -     CBC & Differential  -     Comprehensive Metabolic Panel    2. Hyperlipidemia LDL goal <70  Comments:  cont statin. check lipids  Orders:  -     Comprehensive Metabolic Panel  -     Lipid Panel    3. Type 2 diabetes mellitus with hyperglycemia, without long-term current use of insulin (HCC)  Comments:  cont current regimen. encouraged by weight loss  Orders:  -     Hemoglobin A1c    4. Nonischemic cardiomyopathy  Comments:  cardiology note reviewed. on good regimen with BB, aldactone, entresto, SGLT-2.          There are no discontinued medications.     Follow Up   Return in about 4 months (around 12/26/2022) for HTN, DM.  Patient was given instructions and counseling regarding her condition or for health maintenance advice. Please see specific information pulled into the AVS if appropriate.       Louie Jang Jr, MD  08/26/22  13:07 EDT

## 2022-09-12 ENCOUNTER — PATIENT MESSAGE (OUTPATIENT)
Dept: INTERNAL MEDICINE | Facility: CLINIC | Age: 62
End: 2022-09-12

## 2022-09-13 NOTE — TELEPHONE ENCOUNTER
From: Silvia Massey  To: Louie Jang Jr, MD  Sent: 9/12/2022 1:27 PM EDT  Subject: COVID Booster    Hi Dr. Jang~ I received my J&J Booster on 16 Nov 21. Do I continue taking J&J booster or do I switch over to Pfizer or moderna booster?

## 2022-10-09 ENCOUNTER — HOSPITAL ENCOUNTER (EMERGENCY)
Facility: HOSPITAL | Age: 62
Discharge: HOME | End: 2022-10-09
Attending: STUDENT IN AN ORGANIZED HEALTH CARE EDUCATION/TRAINING PROGRAM | Admitting: EMERGENCY MEDICINE

## 2022-10-09 VITALS
OXYGEN SATURATION: 98 % | WEIGHT: 179.9 LBS | BODY MASS INDEX: 30.71 KG/M2 | HEIGHT: 64 IN | TEMPERATURE: 98.6 F | RESPIRATION RATE: 20 BRPM | SYSTOLIC BLOOD PRESSURE: 130 MMHG | DIASTOLIC BLOOD PRESSURE: 64 MMHG | HEART RATE: 72 BPM

## 2022-10-09 DIAGNOSIS — H20.9 IRITIS: Primary | ICD-10-CM

## 2022-10-09 PROCEDURE — 99283 EMERGENCY DEPT VISIT LOW MDM: CPT

## 2022-10-09 RX ORDER — PROPARACAINE HYDROCHLORIDE 5 MG/ML
2 SOLUTION/ DROPS OPHTHALMIC ONCE
Status: COMPLETED | OUTPATIENT
Start: 2022-10-09 | End: 2022-10-09

## 2022-10-09 RX ORDER — CYCLOPENTOLATE HYDROCHLORIDE 10 MG/ML
1 SOLUTION/ DROPS OPHTHALMIC 3 TIMES DAILY
Qty: 15 ML | Refills: 0 | Status: SHIPPED | OUTPATIENT
Start: 2022-10-09

## 2022-10-09 RX ORDER — PREDNISOLONE ACETATE 10 MG/ML
1 SUSPENSION/ DROPS OPHTHALMIC EVERY 4 HOURS
Qty: 15 ML | Refills: 0 | Status: SHIPPED | OUTPATIENT
Start: 2022-10-09

## 2022-10-09 RX ORDER — CYCLOPENTOLATE HYDROCHLORIDE 10 MG/ML
2 SOLUTION/ DROPS OPHTHALMIC ONCE
Status: COMPLETED | OUTPATIENT
Start: 2022-10-09 | End: 2022-10-09

## 2022-10-09 RX ADMIN — CYCLOPENTOLATE HYDROCHLORIDE 2 DROP: 10 SOLUTION OPHTHALMIC at 18:56

## 2022-10-09 RX ADMIN — PROPARACAINE HYDROCHLORIDE 2 DROP: 5 SOLUTION/ DROPS OPHTHALMIC at 17:30

## 2022-10-11 ENCOUNTER — PATIENT MESSAGE (OUTPATIENT)
Dept: INTERNAL MEDICINE | Facility: CLINIC | Age: 62
End: 2022-10-11

## 2022-10-12 RX ORDER — TRAZODONE HYDROCHLORIDE 50 MG/1
50 TABLET ORAL NIGHTLY
Qty: 30 TABLET | Refills: 0 | Status: SHIPPED | OUTPATIENT
Start: 2022-10-12

## 2022-10-12 NOTE — TELEPHONE ENCOUNTER
From: Silvia Massey  To: Louie Jang Jr, MD  Sent: 10/11/2022 7:34 AM EDT  Subject: Sleep    Good morning Dr. Jang,  I’m not sleeping well. I average 4 hrs of sleep a night. You prescribed trazadone for me two yrs ago. I would like to try another go at that if possible.

## 2022-11-07 NOTE — PROGRESS NOTES
Enter Query Response Below      Query Response: done             If applicable, please update the problem list.   Patient: Silvia Massey        : 1960  Account: [id number]           Admit Date: 10/9/2022    Please update your ED Provider Note with the answer to the following query:        ROXANN Bella  Date: 2022     Will you please addend your provider note to include any and all known final diagnosis or symptoms?    If you have questions about this query, please contact me at [phone]    Sincerely,  uLciana Elizondo  [facility] Coding Department

## 2022-11-09 NOTE — PROGRESS NOTES
Enter Query Response Below      Query Response: The final diagnosis is in the ED provider note on my end.  I feel that the problem may be on Dr Rodríguez's end?  He saw this patient with me.             If applicable, please update the problem list.   Patient: Silvia Massey        : 1960  Account: [id number]           Admit Date: 10/9/2022    Please update your ED Provider Note with the answer to the following query:        K Sharp  Date: 2022     Will you please addend your provider note to include any and all known final diagnosis or symptoms?    If you have questions about this query, please contact me at [phone]    Sincerely,  Luciana Elizondo  [facility] Department

## 2022-11-15 ENCOUNTER — PATIENT MESSAGE (OUTPATIENT)
Dept: INTERNAL MEDICINE | Facility: CLINIC | Age: 62
End: 2022-11-15

## 2022-11-15 DIAGNOSIS — L98.9 SKIN LESION: ICD-10-CM

## 2022-11-15 DIAGNOSIS — L29.9 PRURITUS: ICD-10-CM

## 2022-11-18 NOTE — TELEPHONE ENCOUNTER
From: Silvia Massey  To: Louie Jang Jr, MD  Sent: 11/15/2022 9:53 AM EST  Subject: Allergic reaction    Good morning Dr. Jang,  I’m breaking out in hive like bumps almost everyday. I need a referral to see an allergy specialist or tell me what you think about the attached pics. I get these all over my body at different times. They itch.    Thanks I. Advance.

## 2022-11-18 NOTE — PROGRESS NOTES
Enter Query Response Below      Query Response: Corrected             If applicable, please update the problem list.   Patient: Silvia Massey        : 1960  Account: [id number]           Admit Date: 10/9/2022    Please update your ED Provider Note with the answer to the following query:        ROXANN Bella  Date: 2022     Will you please addend your provider note to include any and all known final diagnosis or symptoms?    If you have questions about this query, please contact me at [phone]    Sincerely,  Luciana Elizondo  [facility] Coding Department

## 2022-11-23 ENCOUNTER — TELEPHONE (OUTPATIENT)
Dept: INTERNAL MEDICINE | Facility: CLINIC | Age: 62
End: 2022-11-23

## 2022-11-23 NOTE — TELEPHONE ENCOUNTER
HUB TO READ MESSAGED RELAYED TO PATIENT. PATIENT EXPRESSED UNDERSTANDING AND HAD NO FOLLOW UP QUESTIONS AT THIS TIME.

## 2022-11-23 NOTE — TELEPHONE ENCOUNTER
Caller: Silvia Massey    Relationship: Self    Best call back number: [phone]    What was the call regarding: PATIENT IS WANTING TO KNOW IF IT IS OKAY TO TAKE BENADRYL.     Do you require a callback: YES

## 2022-11-23 NOTE — TELEPHONE ENCOUNTER
CALLED WENT STRAIGHT TO    LEFT  TO CALL BACK     OKAY FOR HUB TO READ/ADVISE     Yes its okay to take benadryl. Typically wouldn't recommend more than 25mg every 8 hours

## 2022-12-02 PROBLEM — I44.7 LBBB (LEFT BUNDLE BRANCH BLOCK): Status: ACTIVE | Noted: 2022-12-02

## 2022-12-05 ENCOUNTER — TELEPHONE (OUTPATIENT)
Dept: CARDIOLOGY | Facility: CLINIC | Age: 62
End: 2022-12-05

## 2022-12-05 RX ORDER — SACUBITRIL AND VALSARTAN 49; 51 MG/1; MG/1
1 TABLET, FILM COATED ORAL 2 TIMES DAILY
Qty: 28 TABLET | Refills: 0 | COMMUNITY
Start: 2022-12-05 | End: 2022-12-07 | Stop reason: SDUPTHER

## 2022-12-05 NOTE — TELEPHONE ENCOUNTER
----- Message from Ester Mccarthy sent at 12/5/2022 12:07 PM EST -----  Regarding: FW: Peyton/  patient  Contact: [phone]    ----- Message -----  From: Susan Dorantes  Sent: 12/5/2022   9:22 AM EST  To: Ester Mccarthy  Subject: FW: Peyton                                       ----- Message -----  From: Silvia Massey  Sent: 12/5/2022   9:19 AM EST  To: [facility] Clinical Pool  Subject: Entresto                                         Nurse Lucas~ Lara will not get the 49-51md until maybe tomorrow.  Can I please have samples?  I took one of the higher doses yesterday and I whelped up.

## 2022-12-05 NOTE — TELEPHONE ENCOUNTER
Spoke with pt. Giving one bottle sample to her to hold over till [facility] prescription of Entreso comes in.        One bottle- 28 tablets  Lot #: [id number]  EXP: NOV 2024

## 2022-12-07 ENCOUNTER — OFFICE VISIT (OUTPATIENT)
Dept: CARDIOLOGY | Facility: CLINIC | Age: 62
End: 2022-12-07

## 2022-12-07 VITALS
HEIGHT: 64 IN | DIASTOLIC BLOOD PRESSURE: 67 MMHG | SYSTOLIC BLOOD PRESSURE: 121 MMHG | OXYGEN SATURATION: 97 % | WEIGHT: 179 LBS | BODY MASS INDEX: 30.56 KG/M2 | HEART RATE: 86 BPM

## 2022-12-07 DIAGNOSIS — I25.10 NONOCCLUSIVE CORONARY ATHEROSCLEROSIS OF NATIVE CORONARY ARTERY: Primary | ICD-10-CM

## 2022-12-07 DIAGNOSIS — I42.8 NICM (NONISCHEMIC CARDIOMYOPATHY): ICD-10-CM

## 2022-12-07 DIAGNOSIS — E78.5 HYPERLIPIDEMIA LDL GOAL <70: ICD-10-CM

## 2022-12-07 DIAGNOSIS — I10 BENIGN ESSENTIAL HTN: ICD-10-CM

## 2022-12-07 PROCEDURE — 99214 OFFICE O/P EST MOD 30 MIN: CPT | Performed by: INTERNAL MEDICINE

## 2022-12-07 RX ORDER — LISINOPRIL 20 MG/1
20 TABLET ORAL DAILY
Qty: 90 TABLET | Refills: 3 | Status: SHIPPED | OUTPATIENT
Start: 2022-12-07

## 2022-12-07 RX ORDER — SACUBITRIL AND VALSARTAN 49; 51 MG/1; MG/1
1 TABLET, FILM COATED ORAL 2 TIMES DAILY
Qty: 28 TABLET | Refills: 3 | COMMUNITY
Start: 2022-12-07

## 2022-12-07 NOTE — PROGRESS NOTES
Chief Complaint  Follow-up and Hypertension    Subjective    Patient has been doing well symptomatically denies any change in breathing with exertion.  Her weight is up about 12 pounds since her last visit denies no worsening edema issues.  She has been suffering from hives which occur a few times per week allergist thought this may be related to Entresto symptoms decreased when she decreased her Entresto dose.  Her blood pressures been controlled    Past Medical History:   Diagnosis Date   • Anemia    • Arthritis    • CHF (congestive heart failure) ([facility])    • Depression    • Diabetes mellitus ([facility])     type 2   • Dyspnea    • Forgetfulness    • GERD (gastroesophageal reflux disease)    • History of transfusion    • Hypertension    • Night sweats    • Nonischemic cardiomyopathy 5/16/2022   • Nonocclusive CAD 5/16/2022   • Pneumonia    • Sinus trouble    • Sleep apnea    • SOB (shortness of breath)    • Voice hoarseness          Current Outpatient Medications:   •  baclofen (LIORESAL) 10 MG tablet, Take 1 tablet by mouth 3 (Three) Times a Day. (Patient taking differently: Take 10 mg by mouth 3 (Three) Times a Day As Needed.), Disp: 90 tablet, Rfl: 2  •  betamethasone dipropionate (DIPROLENE) 0.05 % lotion, APPLY TO THE AFFECTED AREA ON THE BODY TWICE A DAY AS NEEDED FOR ITCHING UNTIL CLEAR, Disp: , Rfl:   •  carvedilol (COREG) 6.25 MG tablet, Take 1 tablet by mouth 2 (Two) Times a Day., Disp: 180 tablet, Rfl: 3  •  cyclopentolate (Cyclogyl) 1 % ophthalmic solution, Administer 1 drop to both eyes 3 (Three) Times a Day., Disp: 15 mL, Rfl: 0  •  Diclofenac Sodium (Voltaren) 1 % gel gel, Apply 4 g topically to the appropriate area as directed 4 (Four) Times a Day As Needed (pain)., Disp: 350 g, Rfl: 1  •  Dulaglutide (Trulicity) 4.5 MG/0.5ML solution pen-injector, Inject 0.5 mL under the skin into the appropriate area as directed 1 (One) Time Per Week., Disp: 13 pen, Rfl: 3  •  EQ Aspirin Adult Low Dose 81 MG EC tablet,  Take 1 tablet by mouth once daily, Disp: 90 tablet, Rfl: 3  •  Farxiga 10 MG tablet, TAKE 1 TABLET BY MOUTH ONCE DAILY IN THE MORNING FOR 90 DAYS, Disp: 90 tablet, Rfl: 3  •  gabapentin (NEURONTIN) 600 MG tablet, Take 1 tablet by mouth At Night As Needed (pain)., Disp: 90 tablet, Rfl: 0  •  oxyCODONE-acetaminophen (PERCOCET) 5-325 MG per tablet, , Disp: , Rfl:   •  prednisoLONE acetate (prednisoLONE Acetate P-F) 1 % ophthalmic suspension, Administer 1 drop to both eyes Every 4 (Four) Hours., Disp: 15 mL, Rfl: 0  •  rosuvastatin (CRESTOR) 10 MG tablet, TAKE 1 TABLET BY MOUTH EVERY DAY AT BEDTIME, Disp: 90 tablet, Rfl: 0  •  sacubitril-valsartan (Entresto) 49-51 MG tablet, Take 1 tablet by mouth 2 (Two) Times a Day., Disp: 28 tablet, Rfl: 3  •  spironolactone (ALDACTONE) 25 MG tablet, Take 1 tablet by mouth Daily., Disp: 90 tablet, Rfl: 3  •  traZODone (DESYREL) 50 MG tablet, Take 1 tablet by mouth Every Night., Disp: 30 tablet, Rfl: 0  •  triamcinolone (KENALOG) 0.1 % ointment, Apply 1 application topically to the appropriate area as directed 2 (Two) Times a Day., Disp: 80 g, Rfl: 1  •  vitamin D (ERGOCALCIFEROL) 1.25 MG (20229 UT) capsule capsule, Take 1 capsule by mouth once a week, Disp: 13 capsule, Rfl: 1  •  lisinopril (PRINIVIL,ZESTRIL) 20 MG tablet, Take 1 tablet by mouth Daily., Disp: 90 tablet, Rfl: 3  •  sacubitril-valsartan (Entresto)  MG tablet, Take 1 tablet by mouth 2 (Two) Times a Day., Disp: 180 tablet, Rfl: 3    Medications Discontinued During This Encounter   Medication Reason   • sacubitril-valsartan (Entresto) 49-51 MG tablet Reorder     Allergies   Allergen Reactions   • Metformin Unknown - Low Severity        Social History     Tobacco Use   • Smoking status: Former     Packs/day: 0.50     Types: Cigarettes   • Smokeless tobacco: Former   • Tobacco comments:     25 years ago    Vaping Use   • Vaping Use: Never used   Substance Use Topics   • Alcohol use: Yes     Alcohol/week: 1.0 standard  "drink     Types: 1 Cans of beer per week     Comment: occasionally drinks, 1 drink per day, has been drinking for 6-10 yrs,1 beer per night    • Drug use: No       Family History   Problem Relation Age of Onset   • Cancer Mother    • Heart disease Father    • Diabetes Brother    • Heart disease Brother    • Diabetes Other    • Diabetes Other    • Uterine cancer Other         Objective     /67   Pulse 86   Ht 162.6 cm (64\")   Wt 81.2 kg (179 lb)   SpO2 97%   BMI 30.73 kg/m²       Physical Exam    General Appearance:   · no acute distress  · Alert and oriented x3  HENT:   · lips not cyanotic  · Atraumatic  Neck:  · No jvd   · supple  Respiratory:  · no respiratory distress  · normal breath sounds  · no rales  Cardiovascular:  · Regular rate and rhythm  · no S3, no S4   · no murmur  · no rub  Extremities  · No cyanosis  lower extremity edema: Trace skin:   · warm, dry  · No rashes      Result Review :     No results found for: PROBNP  CMP    CMP 1/27/22 5/19/22 8/26/22   Glucose 110 (A) 89 99   BUN 13 15 24 (A)   Creatinine 0.84 0.77 0.78   eGFR African Am 84     Sodium 140 144 142   Potassium 3.9 4.0 4.2   Chloride 103 107 108 (A)   Calcium 10.6 (A) 9.9 10.2   Albumin 5.10 4.90 4.70   Total Bilirubin 0.3 0.3 0.3   Alkaline Phosphatase 70 52 47   AST (SGOT) 13 20 19   ALT (SGPT) 16 22 16   (A) Abnormal value            CBC w/diff    CBC w/Diff 1/27/22 5/19/22 8/26/22   WBC 5.84 4.50 4.64   RBC 5.32 (A) 5.08 5.11   Hemoglobin 14.3 13.8 13.8   Hematocrit 44.2 41.6 42.0   MCV 83.1 81.9 82.2   MCH 26.9 27.2 27.0   MCHC 32.4 33.2 32.9   RDW 13.3 13.2 13.7   Platelets 231 248 214   Neutrophil Rel % 40.1 (A) 44.9 55.0   Immature Granulocyte Rel % 0.2 0.2 0.2   Lymphocyte Rel % 48.8 (A) 44.0 35.8   Monocyte Rel % 7.5 7.3 6.0   Eosinophil Rel % 2.4 2.0 1.9   Basophil Rel % 1.0 1.6 (A) 1.1   (A) Abnormal value             Lab Results   Component Value Date    TSH 1.170 10/23/2019      No results found for: FREET4   No " results found for: DDIMERQUANT  No results found for: MG   No results found for: DIGOXIN   No results found for: TROPONINT        Lipid Panel    Lipid Panel 5/19/22 6/27/22 8/26/22   Total Cholesterol 85  128   Triglycerides 72  60   HDL Cholesterol 40  68 (A)   VLDL Cholesterol 16  13   LDL Cholesterol  29 52 47   LDL/HDL Ratio 0.77  0.71   (A) Abnormal value            No results found for: POCTROP    Results for orders placed in visit on 05/20/22    Adult Transthoracic Echo Complete W/ Cont if Necessary Per Protocol    Interpretation Summary  · Left ventricular ejection fraction appears to be 36 - 40%. Left ventricular systolic function is moderately decreased.  · The left ventricular cavity is mildly dilated.  · Left ventricular wall thickness is consistent with borderline concentric hypertrophy.                 Diagnoses and all orders for this visit:    1. Nonocclusive CAD (Primary)    2. Nonischemic cardiomyopathy  Assessment & Plan:  Patient with stable class II symptoms now though with issues of hives possibly allergic in nature still trying to find the exact causative factor concern over Entresto frequency of hives have decreased his decrease in the dose of this recommended holding her current Entresto dose for day and then changing over to lisinopril 20 mg a day to see if her hives resolve or not.  If there is no change in episodes then we will go back on her Entresto.  She is also to continue on Coreg 6.25 twice daily, Aldactone 25 mg once a day and 4 weeks 10 mg daily.  We will repeat her echocardiogram on follow-up visit    Orders:  -     Adult Transthoracic Echo Complete W/ Cont if Necessary Per Protocol; Future  -     proBNP; Future  -     Basic Metabolic Panel; Future    3. Benign essential HTN  Assessment & Plan:  Blood pressure controlled on current regimen recommended that she keep a close blood pressure log when she changes over to lisinopril to see that it still remains at goal level of less  than 130/80      4. Hyperlipidemia LDL goal <70    Other orders  -     sacubitril-valsartan (Entresto) 49-51 MG tablet; Take 1 tablet by mouth 2 (Two) Times a Day.  Dispense: 28 tablet; Refill: 3  -     lisinopril (PRINIVIL,ZESTRIL) 20 MG tablet; Take 1 tablet by mouth Daily.  Dispense: 90 tablet; Refill: 3          Follow Up     Return in about 6 months (around 6/7/2023) for EKG with F/U.          Patient was given instructions and counseling regarding her condition or for health maintenance advice. Please see specific information pulled into the AVS if appropriate.

## 2022-12-07 NOTE — ASSESSMENT & PLAN NOTE
Blood pressure controlled on current regimen recommended that she keep a close blood pressure log when she changes over to lisinopril to see that it still remains at goal level of less than 130/80

## 2022-12-07 NOTE — ASSESSMENT & PLAN NOTE
Patient with stable class II symptoms now though with issues of hives possibly allergic in nature still trying to find the exact causative factor concern over Entresto frequency of hives have decreased his decrease in the dose of this recommended holding her current Entresto dose for day and then changing over to lisinopril 20 mg a day to see if her hives resolve or not.  If there is no change in episodes then we will go back on her Entresto.  She is also to continue on Coreg 6.25 twice daily, Aldactone 25 mg once a day and 4 weeks 10 mg daily.  We will repeat her echocardiogram on follow-up visit

## 2022-12-19 RX ORDER — ROSUVASTATIN CALCIUM 10 MG/1
TABLET, COATED ORAL
Qty: 90 TABLET | Refills: 0 | Status: SHIPPED | OUTPATIENT
Start: 2022-12-19 | End: 2023-04-03

## 2022-12-23 ENCOUNTER — TELEMEDICINE (OUTPATIENT)
Dept: INTERNAL MEDICINE | Facility: CLINIC | Age: 62
End: 2022-12-23

## 2022-12-23 DIAGNOSIS — K04.7 DENTAL INFECTION: ICD-10-CM

## 2022-12-23 DIAGNOSIS — E78.5 HYPERLIPIDEMIA LDL GOAL <70: ICD-10-CM

## 2022-12-23 DIAGNOSIS — I10 BENIGN ESSENTIAL HTN: Primary | ICD-10-CM

## 2022-12-23 DIAGNOSIS — Z12.31 BREAST CANCER SCREENING BY MAMMOGRAM: ICD-10-CM

## 2022-12-23 DIAGNOSIS — E55.9 VITAMIN D DEFICIENCY: ICD-10-CM

## 2022-12-23 DIAGNOSIS — Z12.11 COLON CANCER SCREENING: ICD-10-CM

## 2022-12-23 DIAGNOSIS — E11.65 TYPE 2 DIABETES MELLITUS WITH HYPERGLYCEMIA, WITHOUT LONG-TERM CURRENT USE OF INSULIN: ICD-10-CM

## 2022-12-23 DIAGNOSIS — Z12.4 CERVICAL CANCER SCREENING: ICD-10-CM

## 2022-12-23 PROCEDURE — 99214 OFFICE O/P EST MOD 30 MIN: CPT | Performed by: INTERNAL MEDICINE

## 2022-12-23 RX ORDER — AMOXICILLIN 875 MG/1
875 TABLET, COATED ORAL 2 TIMES DAILY
Qty: 14 TABLET | Refills: 0 | Status: SHIPPED | OUTPATIENT
Start: 2022-12-23 | End: 2022-12-30

## 2022-12-23 NOTE — PROGRESS NOTES
Mode of Visit: Video via [employer]  Location of patient: home  Physician's location is at clinic ([facility] clinic at [address] [place] [address], [place] [address] [address]).  You have chosen to receive care through a telehealth visit.  The patient has signed the video visit consent form.  The visit included audio and video interaction.      Chief Complaint  DM2 follow-up    Subjective          Silvia Massey presents to [facility]    History of Present Illness  hypertension- patient denies Has,dizziness, chest pain. Home readings   hyperlipidemia- doing well on statin.   CHF- patient continue to follow-up with cardiology, Dr. Santana.   DM2- patient without hypoglycemic events. Tolerating med regimen well. Patient reports dietary noncompliance and less exercise. Patient reports she has gained 3 lbs in last 3 months.   hyperlipidemia- doing well on statin.   Patient reports ongoing hives. Patient thought related to entresto, but has been off medication for 1 week and still having hives. Patient reports having dental pain and concerned for infection. Patient has been unable to get into dentist.     Current Outpatient Medications   Medication Instructions   • amoxicillin (AMOXIL) 875 mg, Oral, 2 Times Daily   • baclofen (LIORESAL) 10 mg, Oral, 3 Times Daily   • betamethasone dipropionate (DIPROLENE) 0.05 % lotion APPLY TO THE AFFECTED AREA ON THE BODY TWICE A DAY AS NEEDED FOR ITCHING UNTIL CLEAR   • carvedilol (COREG) 6.25 mg, Oral, 2 Times Daily   • cyclopentolate (Cyclogyl) 1 % ophthalmic solution 1 drop, Both Eyes, 3 Times Daily   • Diclofenac Sodium (VOLTAREN) 4 g, Topical, 4 Times Daily PRN   • EQ Aspirin Adult Low Dose 81 MG EC tablet Take 1 tablet by mouth once daily   • Farxiga 10 MG tablet TAKE 1 TABLET BY MOUTH ONCE DAILY IN THE MORNING FOR 90 DAYS   • gabapentin (NEURONTIN) 600 mg, Oral, Nightly PRN   • lisinopril (PRINIVIL,ZESTRIL) 20 mg, Oral,  Daily   • oxyCODONE-acetaminophen (PERCOCET) 5-325 MG per tablet No dose, route, or frequency recorded.   • prednisoLONE acetate (prednisoLONE Acetate P-F) 1 % ophthalmic suspension 1 drop, Both Eyes, Every 4 Hours   • rosuvastatin (CRESTOR) 10 MG tablet TAKE 1 TABLET BY MOUTH ONCE DAILY AT BEDTIME   • sacubitril-valsartan (Entresto) 49-51 MG tablet 1 tablet, Oral, 2 Times Daily   • sacubitril-valsartan (Entresto)  MG tablet 1 tablet, Oral, 2 Times Daily   • spironolactone (ALDACTONE) 25 mg, Oral, Daily   • traZODone (DESYREL) 50 mg, Oral, Nightly   • triamcinolone (KENALOG) 0.1 % ointment 1 application, Topical, 2 Times Daily   • Trulicity 4.5 mg, Subcutaneous, Weekly   • vitamin D (ERGOCALCIFEROL) 1.25 MG (31786 UT) capsule capsule Take 1 capsule by mouth once a week       The following portions of the patient's history were reviewed and updated as appropriate: allergies, current medications, past family history, past medical history, past social history, past surgical history, and problem list.    Objective   Vital Signs:   There were no vitals taken for this visit.    Wt Readings from Last 3 Encounters:   12/07/22 81.2 kg (179 lb)   10/09/22 81.6 kg (179 lb 14.3 oz)   08/26/22 80.8 kg (178 lb 3.2 oz)     BP Readings from Last 3 Encounters:   12/07/22 121/67   10/09/22 130/64   08/26/22 121/74       Virtual Visit Physical Exam  Gen: well-nourished, no acute distress  HENT: atraumatic, normocephalic  Eyes: extraocular movements intact, no scleral icterus  Lung: breathing comfortably, no cough  Neuro: grossly oriented to person, place, and time. no facial droop   Psych: normal mood and affect    Result Review :     Common labs    Common Labs 5/19/22 5/19/22 5/19/22 5/19/22 5/19/22 6/27/22 6/27/22 8/26/22 8/26/22 8/26/22 8/26/22    1131 1131 1131 1131 1131 0000 0000 0925 0925 0925 0925   Glucose  89        99    BUN  15        24 (A)    Creatinine  0.77        0.78    Sodium  144        142    Potassium  4.0         4.2    Chloride  107        108 (A)    Calcium  9.9        10.2    Albumin  4.90        4.70    Total Bilirubin  0.3        0.3    Alkaline Phosphatase  52        47    AST (SGOT)  20        19    ALT (SGPT)  22        16    WBC 4.50       4.64      Hemoglobin 13.8       13.8      Hematocrit 41.6       42.0      Platelets 248       214      Total Cholesterol   85        128   Triglycerides   72        60   HDL Cholesterol   40        68 (A)   LDL Cholesterol    29    52    47   Hemoglobin A1C    7.50 (A)  6.30 (A)   6.10 (A)     Microalbumin, Urine     2.1         (A) Abnormal value               Assessment and Plan    Diagnoses and all orders for this visit:    Diagnoses and all orders for this visit:    1. Benign essential HTN (Primary)  Comments:  well controlled on regimen  Orders:  -     CBC & Differential; Future  -     Comprehensive Metabolic Panel; Future    2. Hyperlipidemia LDL goal <70  Comments:  cont statin. check labs.  Orders:  -     Lipid Panel; Future    3. Type 2 diabetes mellitus with hyperglycemia, without long-term current use of insulin (HCC)  Comments:  pt reports good control on regimen. due for labs.   Orders:  -     Hemoglobin A1c; Future    4. Vitamin D deficiency  -     Vitamin D,25-Hydroxy; Future    5. Breast cancer screening by mammogram  Comments:  [facility].     6. Colon cancer screening  -     Ambulatory Referral For Screening Colonoscopy    7. Cervical cancer screening  Comments:  [facility] completed with [facility]. requested [facility].     8. Dental infection  -     amoxicillin (AMOXIL) 875 MG tablet; Take 1 tablet by mouth 2 (Two) Times a Day for 7 days.  Dispense: 14 tablet; Refill: 0        There are no discontinued medications.     Follow Up   Return in about 4 months (around 4/23/2023) for DM, pap.  Patient was given instructions and counseling regarding his condition or for health maintenance advice. Please see specific information pulled into the AVS if  appropriate.

## 2022-12-30 RX ORDER — ERGOCALCIFEROL 1.25 MG/1
50000 CAPSULE ORAL WEEKLY
Qty: 13 CAPSULE | Refills: 1 | Status: SHIPPED | OUTPATIENT
Start: 2022-12-30

## 2022-12-30 NOTE — TELEPHONE ENCOUNTER
Caller: Silvia Massey NANCIE    Relationship: Self    Best call back number: [phone]    Requested Prescriptions:   Requested Prescriptions     Pending Prescriptions Disp Refills   • vitamin D (ERGOCALCIFEROL) 1.25 MG (90151 UT) capsule capsule 13 capsule 1     Sig: Take 1 capsule by mouth 1 (One) Time Per Week.        Pharmacy where request should be sent: [facility] [address] [place] [address] [phone] [facility] [phone] FX     Additional details provided by patient: PATIENT IS CURRENTLY OUT OF THIS MEDICATION.    Does the patient have less than a 3 day supply:  [x] Yes  [] No    Would you like a call back once the refill request has been completed: [x] Yes [] No    If the office needs to give you a call back, can they leave a voicemail: [x] Yes [] No    Joceline Carvajal Rep   12/30/22 16:41 EST

## 2023-01-25 RX ORDER — CARVEDILOL 6.25 MG/1
TABLET ORAL
Qty: 60 TABLET | Refills: 0 | OUTPATIENT
Start: 2023-01-25

## 2023-01-26 RX ORDER — ASPIRIN 81 MG/1
81 TABLET ORAL DAILY
Qty: 90 TABLET | Refills: 3 | Status: SHIPPED | OUTPATIENT
Start: 2023-01-26

## 2023-01-26 RX ORDER — CARVEDILOL 6.25 MG/1
6.25 TABLET ORAL 2 TIMES DAILY
Qty: 180 TABLET | Refills: 3 | Status: SHIPPED | OUTPATIENT
Start: 2023-01-26

## 2023-03-11 DIAGNOSIS — M54.42 CHRONIC MIDLINE LOW BACK PAIN WITH LEFT-SIDED SCIATICA: ICD-10-CM

## 2023-03-11 DIAGNOSIS — G89.29 CHRONIC MIDLINE LOW BACK PAIN WITH LEFT-SIDED SCIATICA: ICD-10-CM

## 2023-03-13 RX ORDER — BACLOFEN 10 MG/1
TABLET ORAL
Qty: 90 TABLET | Refills: 2 | Status: SHIPPED | OUTPATIENT
Start: 2023-03-13

## 2023-03-17 ENCOUNTER — TELEPHONE (OUTPATIENT)
Dept: INTERNAL MEDICINE | Facility: CLINIC | Age: 63
End: 2023-03-17
Payer: COMMERCIAL

## 2023-03-17 DIAGNOSIS — M19.90 ARTHRITIS: ICD-10-CM

## 2023-03-17 RX ORDER — FLUCONAZOLE 150 MG/1
150 TABLET ORAL ONCE
Qty: 2 TABLET | Refills: 0 | Status: SHIPPED | OUTPATIENT
Start: 2023-03-17 | End: 2023-03-17

## 2023-03-17 RX ORDER — GABAPENTIN 600 MG/1
600 TABLET ORAL NIGHTLY PRN
Qty: 90 TABLET | Refills: 0 | Status: SHIPPED | OUTPATIENT
Start: 2023-03-17

## 2023-03-17 NOTE — TELEPHONE ENCOUNTER
Sent but needs to have Q3 month visits since she is on controlled substances. Needs UDS and controlled consent. Please schedule appt for when osiris is back

## 2023-03-17 NOTE — TELEPHONE ENCOUNTER
Control Refill Request:  Medication: GABAPENTIN  Last Office Visit:  8/26/2022  Next Office Visit: 5/26/2023  Last UDS: NOT ON FILE   Last Contract: NOT ON FILE

## 2023-03-17 NOTE — TELEPHONE ENCOUNTER
Caller: Silvia Massey    Relationship: Self    Best call back number: [phone]    Requested Prescriptions:   DIFFLUCAN (PATIENT UNSURE OF MG), SHE IS ASKING FOR ADDITIONAL REFILLS..    Pharmacy where request should be sent: [facility] [address] [place] [address] [phone] [facility] [phone]      Additional details provided by patient: PATIENT IS REQUESTING REFILLS ON THIS MEDICATION WITH ADDITIONAL REFILLS    Does the patient have less than a 3 day supply:  [x] Yes  [] No    Would you like a call back once the refill request has been completed: [x] Yes [] No    If the office needs to give you a call back, can they leave a voicemail: [x] Yes [] No    Joceline Carvajal Rep   03/17/23 14:24 EDT

## 2023-03-21 ENCOUNTER — OFFICE VISIT (OUTPATIENT)
Dept: ORTHOPEDIC SURGERY | Facility: CLINIC | Age: 63
End: 2023-03-21
Payer: COMMERCIAL

## 2023-03-21 VITALS — WEIGHT: 186.4 LBS | BODY MASS INDEX: 31.82 KG/M2 | HEIGHT: 64 IN | HEART RATE: 84 BPM | OXYGEN SATURATION: 97 %

## 2023-03-21 DIAGNOSIS — M25.512 LEFT SHOULDER PAIN, UNSPECIFIED CHRONICITY: Primary | ICD-10-CM

## 2023-03-21 DIAGNOSIS — M75.52 BILATERAL SHOULDER BURSITIS: ICD-10-CM

## 2023-03-21 DIAGNOSIS — M75.51 BILATERAL SHOULDER BURSITIS: ICD-10-CM

## 2023-03-21 DIAGNOSIS — M75.81 ROTATOR CUFF TENDONITIS, RIGHT: ICD-10-CM

## 2023-03-21 DIAGNOSIS — M77.8 LEFT SHOULDER TENDONITIS: ICD-10-CM

## 2023-03-21 DIAGNOSIS — M25.511 RIGHT SHOULDER PAIN, UNSPECIFIED CHRONICITY: ICD-10-CM

## 2023-03-21 PROCEDURE — 20610 DRAIN/INJ JOINT/BURSA W/O US: CPT | Performed by: ORTHOPAEDIC SURGERY

## 2023-03-21 PROCEDURE — 99213 OFFICE O/P EST LOW 20 MIN: CPT | Performed by: ORTHOPAEDIC SURGERY

## 2023-03-21 RX ORDER — LIDOCAINE HYDROCHLORIDE 10 MG/ML
5 INJECTION, SOLUTION EPIDURAL; INFILTRATION; INTRACAUDAL; PERINEURAL
Status: COMPLETED | OUTPATIENT
Start: 2023-03-21 | End: 2023-03-21

## 2023-03-21 RX ORDER — METHYLPREDNISOLONE ACETATE 80 MG/ML
80 INJECTION, SUSPENSION INTRA-ARTICULAR; INTRALESIONAL; INTRAMUSCULAR; SOFT TISSUE
Status: COMPLETED | OUTPATIENT
Start: 2023-03-21 | End: 2023-03-21

## 2023-03-21 RX ADMIN — METHYLPREDNISOLONE ACETATE 80 MG: 80 INJECTION, SUSPENSION INTRA-ARTICULAR; INTRALESIONAL; INTRAMUSCULAR; SOFT TISSUE at 09:03

## 2023-03-21 RX ADMIN — LIDOCAINE HYDROCHLORIDE 5 ML: 10 INJECTION, SOLUTION EPIDURAL; INFILTRATION; INTRACAUDAL; PERINEURAL at 09:03

## 2023-03-21 NOTE — PROGRESS NOTES
"Chief Complaint  Initial Evaluation of the Right Shoulder and Initial Evaluation of the Left Shoulder     Subjective      Silvia Massey presents to [facility] for evaluation of the bilateral shoulders. She reports bilateral shoulder pain with the right side being the worse. She reports pain to the anterior superior shoulder for about 2 months. She denies injury. She reports it radiates down her arm. She reports numbness to her hand. She takes gabapentin. She has no other complaints.     Allergies   Allergen Reactions   • Metformin Unknown - Low Severity        Social History     Socioeconomic History   • Marital status:    Tobacco Use   • Smoking status: Former     Packs/day: 0.50     Types: Cigarettes   • Smokeless tobacco: Former   • Tobacco comments:     25 years ago    Vaping Use   • Vaping Use: Never used   Substance and Sexual Activity   • Alcohol use: Yes     Alcohol/week: 1.0 standard drink     Types: 1 Cans of beer per week     Comment: occasionally drinks, 1 drink per day, has been drinking for 6-10 yrs,1 beer per night    • Drug use: No   • Sexual activity: Defer        Review of Systems     Objective   Vital Signs:   Pulse 84   Ht 162.6 cm (64\")   Wt 84.6 kg (186 lb 6.4 oz)   SpO2 97%   BMI 32.00 kg/m²       Physical Exam  Constitutional:       Appearance: Normal appearance. The patient is well-developed and normal weight.   HENT:      Head: Normocephalic.      Right Ear: Hearing and external ear normal.      Left Ear: Hearing and external ear normal.      Nose: Nose normal.   Eyes:      Conjunctiva/sclera: Conjunctivae normal.   Cardiovascular:      Rate and Rhythm: Normal rate.   Pulmonary:      Effort: Pulmonary effort is normal.      Breath sounds: No wheezing or rales.   Abdominal:      Palpations: Abdomen is soft.      Tenderness: There is no abdominal tenderness.   Musculoskeletal:      Cervical back: Normal range of motion.   Skin:     Findings: No " rash.   Neurological:      Mental Status: The patient is alert and oriented to person, place, and time.   Psychiatric:         Mood and Affect: Mood and affect normal.         Judgment: Judgment normal.       Ortho Exam      Right shoulder- tender to the anterior superior shoulder. Forward elevation 160. Abduction 90. External Rotation 45. Internal rotation 25. External Rotation at the side 80. Internal rotation to the buttocks. 4+ supraspinatus strength, 5/5 infraspinatus and subscapularis. Positive impingement signs. Sensation grossly intact. Positive pulses    Left shoulder- Forward elevation 170. Abduction 145. External Rotation 80. Internal rotation 70. 4+ supraspinatus strength, 5/5 infraspinatus and subscapularis. Internal rotation to L-5. Positive impingement signs. Sensation grossly intact. Positive pulses. palpable mass over the anterior superior shoulder, 2x2cm likely lipoma.     Right shoulder  Date/Time: 3/21/2023 9:03 AM  Consent given by: patient  Site marked: site marked  Timeout: Immediately prior to procedure a time out was called to verify the correct patient, procedure, equipment, support staff and site/side marked as required   Supporting Documentation  Indications: pain   Procedure Details  Location: shoulder (Right shoulder) -   Needle gauge: 21G.  Medications administered: 5 mL lidocaine PF 1% 1 %; 80 mg methylPREDNISolone acetate 80 MG/ML  Patient tolerance: patient tolerated the procedure well with no immediate complications          X-Ray Report:  Right scapula X-Ray  Indication: Evaluation of right shoulder pain  AP/Lateral view(s)  Findings: mild degenerative changes to the glenohumeral joint and acromioclavicular joint. Downward sloping acromion.   Prior studies available for comparison: no     X-Ray Report:  Left scapula X-Ray  Indication: Evaluation of left shoulder pain  AP/Lateral view(s)  Findings: mild degenerative changes to the glenohumeral joint and acromioclavicular joint.  Downward sloping acromion.   Prior studies available for comparison: no         Imaging Results (Most Recent)     Procedure Component Value Units Date/Time    XR Scapula Right [[id number]] Resulted: 03/21/23 0815     Updated: 03/21/23 0824    XR Scapula Left [[id number]] Resulted: 03/21/23 0815     Updated: 03/21/23 0824           Result Review :       No results found.           Assessment and Plan     Diagnoses and all orders for this visit:    1. Left shoulder pain, unspecified chronicity (Primary)  -     XR Scapula Left    2. Right shoulder pain, unspecified chronicity  -     XR Scapula Right    3. Rotator cuff tendonitis, right    4. Left shoulder tendonitis    5. Bilateral shoulder bursitis        Discussed the treatment plan with the patient.  I reviewed the x-rays that were obtained today with the patient. Discussed the risks and benefits of a right shoulder steroid injection. The patient expressed understanding and wished to proceed. She tolerated the injection well. Home exercises given today. May consider MRI if symptoms persist.     Call or return if worsening symptoms.    Follow Up     6 weeks      Patient was given instructions and counseling regarding her condition or for health maintenance advice. Please see specific information pulled into the AVS if appropriate.     Scribed for Arnulfo Armijo MD by Cindi Davey.  03/21/23   08:21 EDT

## 2023-03-24 ENCOUNTER — OFFICE VISIT (OUTPATIENT)
Dept: SURGERY | Facility: CLINIC | Age: 63
End: 2023-03-24
Payer: COMMERCIAL

## 2023-03-24 ENCOUNTER — PREP FOR SURGERY (OUTPATIENT)
Dept: OTHER | Facility: HOSPITAL | Age: 63
End: 2023-03-24
Payer: COMMERCIAL

## 2023-03-24 VITALS — BODY MASS INDEX: 30.9 KG/M2 | HEART RATE: 72 BPM | HEIGHT: 64 IN | WEIGHT: 181 LBS

## 2023-03-24 DIAGNOSIS — Z12.11 SCREENING FOR MALIGNANT NEOPLASM OF COLON: ICD-10-CM

## 2023-03-24 DIAGNOSIS — K21.9 GERD WITHOUT ESOPHAGITIS: Primary | ICD-10-CM

## 2023-03-24 DIAGNOSIS — K21.9 GASTROESOPHAGEAL REFLUX DISEASE WITHOUT ESOPHAGITIS: Primary | ICD-10-CM

## 2023-03-24 PROCEDURE — 99203 OFFICE O/P NEW LOW 30 MIN: CPT | Performed by: NURSE PRACTITIONER

## 2023-03-24 RX ORDER — PEG-3350, SODIUM SULFATE, SODIUM CHLORIDE, POTASSIUM CHLORIDE, SODIUM ASCORBATE AND ASCORBIC ACID 7.5-2.691G
1000 KIT ORAL ONCE
Qty: 1000 ML | Refills: 0 | Status: SHIPPED | OUTPATIENT
Start: 2023-03-24 | End: 2023-03-24

## 2023-03-24 NOTE — PROGRESS NOTES
Chief Complaint: Colonoscopy (consult)    Subjective      EGD and colonoscopy consultation       History of Present Illness  Silvia Massey is a 62 y.o. female presents to [facility] for EGD and colonoscopy consultation.    Patient presents today on referral from Dr. Louie Jang for an EGD and colonoscopy consultation.  Patient reports that she has with heartburn and indigestion 4 out of 7 days.  She reports that she has been taken some OCT meds to control her symptoms.  She denies any dysphagia, epigastric pain or melena.    Denies any lower abdominal pain, change in bowel habit, or rectal bleeding.    Patient has a history of a left bundle branch block and CHF.  I will get cardiac clearance from Dr. Santana prior prior to the procedure.    10/17: EGD & Colonoscopy ([facility]): Normal EGD; internal hemorrhoids, otherwise normal.   Objective     Past Medical History:   Diagnosis Date   • Anemia    • Arthritis    • CHF (congestive heart failure) (HCC)    • Depression    • Diabetes mellitus (HCC)     type 2   • Dyspnea    • Forgetfulness    • GERD (gastroesophageal reflux disease)    • History of transfusion    • Hypertension    • Night sweats    • Nonischemic cardiomyopathy 5/16/2022   • Nonocclusive CAD 5/16/2022   • Pneumonia    • Sinus trouble    • Sleep apnea    • SOB (shortness of breath)    • Voice hoarseness        Past Surgical History:   Procedure Laterality Date   • BRONCHOSCOPY N/A 10/18/2018    Procedure: BRONCHOSCOPY WITH BIOPSIES, BAL;  Surgeon: Rayray Balck MD;  Location: [facility];  Service: Pulmonary   • COLONOSCOPY  2018    [facility]l    • ENDOSCOPY  2017   • EYE SURGERY  07/09/2019    blepharoplasty   • LYMPHANGIOMA EXCISION     • OOPHORECTOMY     • SINUS SURGERY     • TUBAL ABDOMINAL LIGATION         Outpatient Medications Marked as Taking for the 3/24/23 encounter (Office Visit) with Zachariah April, APRN   Medication Sig Dispense Refill   • aspirin (EQ  Aspirin Adult Low Dose) 81 MG EC tablet Take 1 tablet by mouth Daily. 90 tablet 3   • baclofen (LIORESAL) 10 MG tablet TAKE 1 TABLET BY MOUTH THREE TIMES DAILY 90 tablet 2   • betamethasone dipropionate (DIPROLENE) 0.05 % lotion APPLY TO THE AFFECTED AREA ON THE BODY TWICE A DAY AS NEEDED FOR ITCHING UNTIL CLEAR     • carvedilol (COREG) 6.25 MG tablet Take 1 tablet by mouth 2 (Two) Times a Day. 180 tablet 3   • cyclopentolate (Cyclogyl) 1 % ophthalmic solution Administer 1 drop to both eyes 3 (Three) Times a Day. 15 mL 0   • Diclofenac Sodium (Voltaren) 1 % gel gel Apply 4 g topically to the appropriate area as directed 4 (Four) Times a Day As Needed (pain). 350 g 1   • Dulaglutide (Trulicity) 4.5 MG/0.5ML solution pen-injector Inject 0.5 mL under the skin into the appropriate area as directed 1 (One) Time Per Week. 13 pen 3   • Farxiga 10 MG tablet TAKE 1 TABLET BY MOUTH ONCE DAILY IN THE MORNING FOR 90 DAYS 90 tablet 3   • gabapentin (NEURONTIN) 600 MG tablet Take 1 tablet by mouth At Night As Needed (pain). 90 tablet 0   • lisinopril (PRINIVIL,ZESTRIL) 20 MG tablet Take 1 tablet by mouth Daily. 90 tablet 3   • oxyCODONE-acetaminophen (PERCOCET) 5-325 MG per tablet      • prednisoLONE acetate (prednisoLONE Acetate P-F) 1 % ophthalmic suspension Administer 1 drop to both eyes Every 4 (Four) Hours. 15 mL 0   • rosuvastatin (CRESTOR) 10 MG tablet TAKE 1 TABLET BY MOUTH ONCE DAILY AT BEDTIME 90 tablet 0   • sacubitril-valsartan (Entresto) 49-51 MG tablet Take 1 tablet by mouth 2 (Two) Times a Day. 28 tablet 3   • sacubitril-valsartan (Entresto)  MG tablet Take 1 tablet by mouth 2 (Two) Times a Day. 180 tablet 3   • spironolactone (ALDACTONE) 25 MG tablet Take 1 tablet by mouth Daily. 90 tablet 3   • traZODone (DESYREL) 50 MG tablet Take 1 tablet by mouth Every Night. 30 tablet 0   • triamcinolone (KENALOG) 0.1 % ointment Apply 1 application topically to the appropriate area as directed 2 (Two) Times a Day. 80 g  "1   • vitamin D (ERGOCALCIFEROL) 1.25 MG (09191 UT) capsule capsule Take 1 capsule by mouth 1 (One) Time Per Week. 13 capsule 1       Allergies   Allergen Reactions   • Metformin Unknown - Low Severity        Family History   Problem Relation Age of Onset   • Cancer Mother    • Heart disease Father    • Diabetes Brother    • Heart disease Brother    • Diabetes Other    • Diabetes Other    • Uterine cancer Other        Social History     Socioeconomic History   • Marital status:    Tobacco Use   • Smoking status: Former     Packs/day: 0.50     Types: Cigarettes   • Smokeless tobacco: Former   • Tobacco comments:     25 years ago    Vaping Use   • Vaping Use: Never used   Substance and Sexual Activity   • Alcohol use: Yes     Alcohol/week: 1.0 standard drink     Types: 1 Cans of beer per week     Comment: occasionally drinks, 1 drink per day, has been drinking for 6-10 yrs,1 beer per night    • Drug use: No   • Sexual activity: Defer       Review of Systems   Constitutional: Negative for chills and fever.   Gastrointestinal: Positive for GERD and indigestion. Negative for abdominal distention, abdominal pain, anal bleeding, blood in stool, constipation, diarrhea, nausea, rectal pain and vomiting.        Vital Signs:   Pulse 72   Ht 162.6 cm (64\")   Wt 82.1 kg (181 lb)   BMI 31.07 kg/m²      Physical Exam  Vitals and nursing note reviewed.   Constitutional:       General: She is not in acute distress.     Appearance: Normal appearance.   HENT:      Head: Normocephalic.   Cardiovascular:      Rate and Rhythm: Normal rate.   Pulmonary:      Effort: Pulmonary effort is normal.      Breath sounds: No stridor.   Abdominal:      Palpations: Abdomen is soft.      Tenderness: There is no guarding.   Musculoskeletal:         General: No deformity. Normal range of motion.   Skin:     General: Skin is warm and dry.      Coloration: Skin is not jaundiced.   Neurological:      General: No focal deficit present.      " Mental Status: She is alert and oriented to person, place, and time.   Psychiatric:         Mood and Affect: Mood normal.         Thought Content: Thought content normal.          Result Review :          []  Laboratory  []  Radiology  []  Pathology  []  Microbiology  []  EKG/Telemetry   []  Cardiology/Vascular   []  Old records  I spent 20 minutes caring for Silvia on this date of service. This time includes time spent by me in the following activities: reviewing tests, obtaining and/or reviewing a separately obtained history, performing a medically appropriate examination and/or evaluation, ordering medications, tests, or procedures, and documenting information in the medical record.     Assessment and Plan    Diagnoses and all orders for this visit:    1. Gastroesophageal reflux disease without esophagitis (Primary)    2. Screening for malignant neoplasm of colon    Other orders  -     PEG-KCl-NaCl-NaSulf-Na Asc-C (MOVIPREP) 100 g reconstituted solution powder; Take 1,000 mL by mouth 1 (One) Time for 1 dose.  Dispense: 1000 mL; Refill: 0        Follow Up   Return for Scheduled EGD and colonoscopy with Dr. Maldonado on 7/6/2023 at [facility].     Phone PAT.  Hospital call with arrival time    Possible risks/complications, benefits, and alternatives to surgical or invasive procedure have been explained to patient and/or legal guardian.     Patient has been evaluated and can tolerate anesthesia and/or sedation. Risks, benefits, and alternatives to anesthesia and sedation have been explained to patient and/or legal guardian.  Patient verbalizes understanding and is willing to proceed with above plan.    Patient was given instructions and counseling regarding her condition or for health maintenance advice. Please see specific information pulled into the AVS if appropriate.

## 2023-04-03 RX ORDER — ROSUVASTATIN CALCIUM 10 MG/1
TABLET, COATED ORAL
Qty: 90 TABLET | Refills: 3 | Status: SHIPPED | OUTPATIENT
Start: 2023-04-03

## 2023-04-13 ENCOUNTER — PRIOR AUTHORIZATION (OUTPATIENT)
Dept: INTERNAL MEDICINE | Facility: CLINIC | Age: 63
End: 2023-04-13
Payer: COMMERCIAL

## 2023-04-18 NOTE — TELEPHONE ENCOUNTER
SUBMITTED CLINICAL QUESTIONS VIA [employer] 04/18/2023    AWAITING RESPONSE FROM INSURANCE     Key: [id number]

## 2023-05-01 ENCOUNTER — TELEPHONE (OUTPATIENT)
Dept: INTERNAL MEDICINE | Facility: CLINIC | Age: 63
End: 2023-05-01
Payer: COMMERCIAL

## 2023-05-01 DIAGNOSIS — M19.90 ARTHRITIS: ICD-10-CM

## 2023-05-01 DIAGNOSIS — E11.65 TYPE 2 DIABETES MELLITUS WITH HYPERGLYCEMIA, WITHOUT LONG-TERM CURRENT USE OF INSULIN: ICD-10-CM

## 2023-05-01 RX ORDER — FLUCONAZOLE 150 MG/1
150 TABLET ORAL
Qty: 5 TABLET | Refills: 0 | Status: SHIPPED | OUTPATIENT
Start: 2023-05-01

## 2023-05-01 RX ORDER — DULAGLUTIDE 4.5 MG/.5ML
4.5 INJECTION, SOLUTION SUBCUTANEOUS WEEKLY
Qty: 6 ML | Refills: 3 | Status: SHIPPED | OUTPATIENT
Start: 2023-05-01

## 2023-05-01 RX ORDER — DULAGLUTIDE 1.5 MG/.5ML
INJECTION, SOLUTION SUBCUTANEOUS
Qty: 12 ML | Refills: 3 | OUTPATIENT
Start: 2023-05-01

## 2023-05-01 NOTE — TELEPHONE ENCOUNTER
Caller: Sivlia Massey    Relationship: Self    Best call back number: [phone]     What medication are you requesting: DIFLUCIN    What are your current symptoms: YEAST INFECTION    How long have you been experiencing symptoms:     Have you had these symptoms before:    [x] Yes  [] No    Have you been treated for these symptoms before:   [x] Yes  [] No    If a prescription is needed, what is your preferred pharmacy and phone number: [facility] [address] [place] [address] [phone] [facility] [phone]      Additional notes: REQUESTING MORE THAN 2 PILLS

## 2023-05-01 NOTE — TELEPHONE ENCOUNTER
Caller: Silvia Massey NANCIE    Relationship: Self    Best call back number: [phone]    Requested Prescriptions:   Requested Prescriptions     Pending Prescriptions Disp Refills   • Diclofenac Sodium (Voltaren) 1 % gel gel 350 g 1     Sig: Apply 4 g topically to the appropriate area as directed 4 (Four) Times a Day As Needed (pain).    DIFLUCAN PILL FORM FOR YEAST     Pharmacy where request should be sent: [address] [phone] [facility] [phone]      Last office visit with prescribing clinician: 8/26/2022   Last telemedicine visit with prescribing clinician: 5/26/2023   Next office visit with prescribing clinician: 5/26/2023     Additional details provided by patient: PATIENT IS ASKING FOR MORE THAN 2 YEAST INFECTION MEDICATIONS SHE WOULD LIKE REFILLS     Does the patient have less than a 3 day supply:  [x] Yes  [] No    Would you like a call back once the refill request has been completed: [] Yes [] No    If the office needs to give you a call back, can they leave a voicemail: [] Yes [] No    Kennedi Dyson, PCT   05/01/23 09:05 EDT

## 2023-05-02 ENCOUNTER — OFFICE VISIT (OUTPATIENT)
Dept: ORTHOPEDIC SURGERY | Facility: CLINIC | Age: 63
End: 2023-05-02
Payer: COMMERCIAL

## 2023-05-02 VITALS — WEIGHT: 180 LBS | OXYGEN SATURATION: 96 % | BODY MASS INDEX: 30.73 KG/M2 | HEIGHT: 64 IN | HEART RATE: 84 BPM

## 2023-05-02 DIAGNOSIS — M25.511 RIGHT SHOULDER PAIN, UNSPECIFIED CHRONICITY: ICD-10-CM

## 2023-05-02 DIAGNOSIS — M75.81 ROTATOR CUFF TENDONITIS, RIGHT: ICD-10-CM

## 2023-05-02 DIAGNOSIS — M75.51 BILATERAL SHOULDER BURSITIS: ICD-10-CM

## 2023-05-02 DIAGNOSIS — M75.52 BILATERAL SHOULDER BURSITIS: ICD-10-CM

## 2023-05-02 DIAGNOSIS — M77.8 LEFT SHOULDER TENDONITIS: ICD-10-CM

## 2023-05-02 DIAGNOSIS — M25.512 LEFT SHOULDER PAIN, UNSPECIFIED CHRONICITY: Primary | ICD-10-CM

## 2023-05-02 RX ORDER — MELOXICAM 15 MG/1
15 TABLET ORAL DAILY
Qty: 30 TABLET | Refills: 2 | Status: SHIPPED | OUTPATIENT
Start: 2023-05-02

## 2023-05-02 RX ORDER — POLYETHYLENE GLYCOL 3350, SODIUM SULFATE, SODIUM CHLORIDE, POTASSIUM CHLORIDE, SODIUM ASCORBATE, AND ASCORBIC ACID 7.5-2.691G
KIT ORAL
COMMUNITY
Start: 2023-03-27

## 2023-05-02 NOTE — PROGRESS NOTES
Chief Complaint  Follow-up and Pain of the Right Shoulder and Follow-up and Pain of the Left Shoulder    Subjective          History of Present Illness      Silvia Massey is a 63 y.o. female  presents to [facility] for     Patient presents for follow-up evaluation of bilateral shoulder pain, bilateral shoulder tendinitis bursitis osteoarthritis.  She was seen by Dr. Armijo on 3/21/2023 gave her a right shoulder steroid injection since her right shoulder is worse and she states the injection was excruciating to receive and she states it did not help and made her shoulder pain worse.  She points anterior lateral shoulder as her area of worst pain she states the left shoulder is not as bad as the right and she would like to focus on the right shoulder.  She denies injury or trauma but states the shoulder has been hurting for about 3 to 4 months.  She has tried resting, Voltaren gel, steroid injection and home exercise with no relief.      Allergies   Allergen Reactions   • Metformin Unknown - Low Severity        Social History     Socioeconomic History   • Marital status:    Tobacco Use   • Smoking status: Former     Packs/day: 0.50     Years: 10.00     Pack years: 5.00     Types: Cigarettes     Start date: 1977     Quit date: 10/11/1987     Years since quittin.5   • Smokeless tobacco: Former   • Tobacco comments:     25 years ago    Vaping Use   • Vaping Use: Never used   Substance and Sexual Activity   • Alcohol use: Yes     Alcohol/week: 2.0 standard drinks     Types: 2 Glasses of wine per week     Comment: occasionally drinks, 1 drink per day, has been drinking for 6-10 yrs,1 beer per night    • Drug use: No   • Sexual activity: Yes     Partners: Male     Birth control/protection: Hysterectomy     Comment: Partcial hysterectomy  one ivary and one tube        REVIEW OF SYSTEMS    Constitutional: Denies fevers, chills, weight loss  Cardiovascular: Denies chest  "pain, shortness of breath  Skin: Denies rashes, acute skin changes  Neurologic: Denies headache, loss of consciousness  MSK: Right shoulder pain      Objective   Vital Signs:   Pulse 84   Ht 162.6 cm (64\")   Wt 81.6 kg (180 lb)   SpO2 96%   BMI 30.90 kg/m²     Body mass index is 30.9 kg/m².    Physical Exam    Right shoulder: Tender to palpation anterior superior shoulder active forward elevation 160 with pain, active abduction 90 with pain, external rotation 45, internal rotation 25 external rotation at side 80 internal rotation to buttocks, 4+ supraspinatus 5 out of 5 infraspinatus and subscapularis, positive impingement testing    Procedures    Imaging Results (Most Recent)     None           Result Review :   The following data was reviewed by: TAMARA Lisa on 05/02/2023:               Assessment and Plan    Diagnoses and all orders for this visit:    1. Left shoulder pain, unspecified chronicity (Primary)    2. Right shoulder pain, unspecified chronicity  -     MRI Shoulder Right Without Contrast; Future    3. Rotator cuff tendonitis, right  -     MRI Shoulder Right Without Contrast; Future    4. Left shoulder tendonitis    5. Bilateral shoulder bursitis  -     MRI Shoulder Right Without Contrast; Future    Other orders  -     meloxicam (MOBIC) 15 MG tablet; Take 1 tablet by mouth Daily.  Dispense: 30 tablet; Refill: 2        Discussed diagnosis and treatment options with the patient she was advised we will give her meloxicam, take as directed, we will order MRI of the right shoulder to rule out internal derangement due to failing conservative treatments, she agreed follow-up after MRI    Call or return if worsening symptoms.    Follow Up   Return for After MRI.  Patient was given instructions and counseling regarding her condition or for health maintenance advice. Please see specific information pulled into the AVS if appropriate.       "

## 2023-05-08 ENCOUNTER — TELEPHONE (OUTPATIENT)
Dept: CARDIOLOGY | Facility: CLINIC | Age: 63
End: 2023-05-08
Payer: COMMERCIAL

## 2023-05-08 RX ORDER — DAPAGLIFLOZIN 10 MG/1
TABLET, FILM COATED ORAL
Qty: 90 TABLET | Refills: 0 | Status: SHIPPED | OUTPATIENT
Start: 2023-05-08

## 2023-05-08 NOTE — TELEPHONE ENCOUNTER
JENNIFFER patient regarding results and recommendations. Voiced understanding.     Patient taking:  Lisinopril 20 mg daily  Spironolactone 25 mg daily  Farxiga 10 mg daily

## 2023-05-08 NOTE — TELEPHONE ENCOUNTER
----- Message from GABRIELLA Kim sent at 5/8/2023 12:41 PM EDT -----  Notify pt echo result: Left ventricular systolic function is mildly decreased. Left ventricular ejection fraction appears to be 36 - 40% which is unchanged compared to prior echocardiogram. Find out if she is taking lisinopril or Entresto and what the current dose is. Find out if still taking spironolactone and/or farxiga as well. Follow up as scheduled

## 2023-05-19 ENCOUNTER — TELEPHONE (OUTPATIENT)
Dept: SURGERY | Facility: CLINIC | Age: 63
End: 2023-05-19
Payer: COMMERCIAL

## 2023-05-19 NOTE — TELEPHONE ENCOUNTER
[facility] [place]  [facility]  [address]  [place] [address]  Fax [phone]  Phone [phone]     To whom it may concern:    I am writing on behalf of our mutual patient Silvia Massey 1960     Silvia Massey  is scheduled to have a colonoscopy and EGD by  which will be performed at [facility] on 7/6/23. Please respond to this request noting your recommendations regarding clearance from the cardiology standpoint. You may contact our office at [phone] with any questions. I appreciate your prompt response to this matter. Please return this form to our office as soon as possible to [phone]. Silvia Massey is scheduled for this procedure pending your approval. Thank you for your time and assistance.     [] I approve my patient, Silvia Massey , to proceed with the surgical procedure listed above.   [] I do NOT approve my patient, Silvia Massey , to proceed with the surgical procedure listed above.   [] I approve my patient, Silvia Massey , from a cardiology standpoint.   [] I do NOT approve my patient, Silvia Massey , from a cardiology standpoint at this time.       Approving physician name(please print): ________________________________________    Approving physician signature: _____________________________________________     Date: ____________________________    Sincerely,   Rylie QUIROZ

## 2023-05-22 NOTE — TELEPHONE ENCOUNTER
Procedure: Colonoscopy and/or EGD     Med Directive: NA     PMH: CAD, NICM, HTN, HLD     Last Seen: 12/7/22

## 2023-05-25 ENCOUNTER — LAB (OUTPATIENT)
Dept: LAB | Facility: HOSPITAL | Age: 63
End: 2023-05-25
Payer: COMMERCIAL

## 2023-05-25 ENCOUNTER — TELEPHONE (OUTPATIENT)
Dept: INTERNAL MEDICINE | Facility: CLINIC | Age: 63
End: 2023-05-25
Payer: COMMERCIAL

## 2023-05-25 DIAGNOSIS — E11.65 TYPE 2 DIABETES MELLITUS WITH HYPERGLYCEMIA, WITHOUT LONG-TERM CURRENT USE OF INSULIN: Primary | ICD-10-CM

## 2023-05-25 DIAGNOSIS — E11.65 TYPE 2 DIABETES MELLITUS WITH HYPERGLYCEMIA, WITHOUT LONG-TERM CURRENT USE OF INSULIN: ICD-10-CM

## 2023-05-25 DIAGNOSIS — I10 BENIGN ESSENTIAL HTN: ICD-10-CM

## 2023-05-25 DIAGNOSIS — E78.5 HYPERLIPIDEMIA LDL GOAL <70: ICD-10-CM

## 2023-05-25 DIAGNOSIS — E55.9 VITAMIN D DEFICIENCY: ICD-10-CM

## 2023-05-25 DIAGNOSIS — I42.8 NICM (NONISCHEMIC CARDIOMYOPATHY): ICD-10-CM

## 2023-05-25 LAB
25(OH)D3 SERPL-MCNC: 58.2 NG/ML (ref 30–100)
ALBUMIN SERPL-MCNC: 5 G/DL (ref 3.5–5.2)
ALBUMIN UR-MCNC: 1.4 MG/DL
ALBUMIN/GLOB SERPL: 2 G/DL
ALP SERPL-CCNC: 56 U/L (ref 39–117)
ALT SERPL W P-5'-P-CCNC: 15 U/L (ref 1–33)
ANION GAP SERPL CALCULATED.3IONS-SCNC: 11.3 MMOL/L (ref 5–15)
AST SERPL-CCNC: 17 U/L (ref 1–32)
BASOPHILS # BLD AUTO: 0.09 10*3/MM3 (ref 0–0.2)
BASOPHILS NFR BLD AUTO: 1.5 % (ref 0–1.5)
BILIRUB SERPL-MCNC: 0.2 MG/DL (ref 0–1.2)
BUN SERPL-MCNC: 21 MG/DL (ref 8–23)
BUN/CREAT SERPL: 25.3 (ref 7–25)
CALCIUM SPEC-SCNC: 10.7 MG/DL (ref 8.6–10.5)
CHLORIDE SERPL-SCNC: 104 MMOL/L (ref 98–107)
CHOLEST SERPL-MCNC: 109 MG/DL (ref 0–200)
CO2 SERPL-SCNC: 26.7 MMOL/L (ref 22–29)
CREAT SERPL-MCNC: 0.83 MG/DL (ref 0.57–1)
CREAT UR-MCNC: 103.9 MG/DL
DEPRECATED RDW RBC AUTO: 41.5 FL (ref 37–54)
EGFRCR SERPLBLD CKD-EPI 2021: 79.3 ML/MIN/1.73
EOSINOPHIL # BLD AUTO: 0.19 10*3/MM3 (ref 0–0.4)
EOSINOPHIL NFR BLD AUTO: 3.3 % (ref 0.3–6.2)
ERYTHROCYTE [DISTWIDTH] IN BLOOD BY AUTOMATED COUNT: 13.5 % (ref 12.3–15.4)
GLOBULIN UR ELPH-MCNC: 2.5 GM/DL
GLUCOSE SERPL-MCNC: 110 MG/DL (ref 65–99)
HBA1C MFR BLD: 6.5 % (ref 4.8–5.6)
HCT VFR BLD AUTO: 41.5 % (ref 34–46.6)
HDLC SERPL-MCNC: 62 MG/DL (ref 40–60)
HGB BLD-MCNC: 13.5 G/DL (ref 12–15.9)
IMM GRANULOCYTES # BLD AUTO: 0.01 10*3/MM3 (ref 0–0.05)
IMM GRANULOCYTES NFR BLD AUTO: 0.2 % (ref 0–0.5)
LDLC SERPL CALC-MCNC: 33 MG/DL (ref 0–100)
LDLC/HDLC SERPL: 0.54 {RATIO}
LYMPHOCYTES # BLD AUTO: 2.22 10*3/MM3 (ref 0.7–3.1)
LYMPHOCYTES NFR BLD AUTO: 38.1 % (ref 19.6–45.3)
MCH RBC QN AUTO: 27.5 PG (ref 26.6–33)
MCHC RBC AUTO-ENTMCNC: 32.5 G/DL (ref 31.5–35.7)
MCV RBC AUTO: 84.5 FL (ref 79–97)
MICROALBUMIN/CREAT UR: 13.5 MG/G
MONOCYTES # BLD AUTO: 0.39 10*3/MM3 (ref 0.1–0.9)
MONOCYTES NFR BLD AUTO: 6.7 % (ref 5–12)
NEUTROPHILS NFR BLD AUTO: 2.93 10*3/MM3 (ref 1.7–7)
NEUTROPHILS NFR BLD AUTO: 50.2 % (ref 42.7–76)
NRBC BLD AUTO-RTO: 0 /100 WBC (ref 0–0.2)
NT-PROBNP SERPL-MCNC: 42.5 PG/ML (ref 0–900)
PLATELET # BLD AUTO: 236 10*3/MM3 (ref 140–450)
PMV BLD AUTO: 11 FL (ref 6–12)
POTASSIUM SERPL-SCNC: 4.6 MMOL/L (ref 3.5–5.2)
PROT SERPL-MCNC: 7.5 G/DL (ref 6–8.5)
RBC # BLD AUTO: 4.91 10*6/MM3 (ref 3.77–5.28)
SODIUM SERPL-SCNC: 142 MMOL/L (ref 136–145)
TRIGL SERPL-MCNC: 68 MG/DL (ref 0–150)
VLDLC SERPL-MCNC: 14 MG/DL (ref 5–40)
WBC NRBC COR # BLD: 5.83 10*3/MM3 (ref 3.4–10.8)

## 2023-05-25 PROCEDURE — 82570 ASSAY OF URINE CREATININE: CPT

## 2023-05-25 PROCEDURE — 85025 COMPLETE CBC W/AUTO DIFF WBC: CPT

## 2023-05-25 PROCEDURE — 83880 ASSAY OF NATRIURETIC PEPTIDE: CPT

## 2023-05-25 PROCEDURE — 82043 UR ALBUMIN QUANTITATIVE: CPT

## 2023-05-25 PROCEDURE — 36415 COLL VENOUS BLD VENIPUNCTURE: CPT

## 2023-05-25 PROCEDURE — 80053 COMPREHEN METABOLIC PANEL: CPT

## 2023-05-25 PROCEDURE — 83036 HEMOGLOBIN GLYCOSYLATED A1C: CPT

## 2023-05-25 PROCEDURE — 80061 LIPID PANEL: CPT

## 2023-05-25 PROCEDURE — 82306 VITAMIN D 25 HYDROXY: CPT

## 2023-05-25 NOTE — TELEPHONE ENCOUNTER
Caller: Silvia Massey    Relationship: Self    Best call back number: [phone]    What orders are you requesting (i.e. lab or imaging): STANDARD LAB ORDERS    In what timeframe would the patient need to come in: PATIENT IS CURRENTLY AT LAB AT HOSPITAL. FAX: [phone]    Where will you receive your lab/imaging services: [facility]

## 2023-05-25 NOTE — TELEPHONE ENCOUNTER
Called patient back- she stated that she is needing/ wanting all these labs A1C, CBC, CMP, vitamin D, Lipid, Pro BNP, Urine

## 2023-05-26 ENCOUNTER — OFFICE VISIT (OUTPATIENT)
Dept: INTERNAL MEDICINE | Facility: CLINIC | Age: 63
End: 2023-05-26
Payer: COMMERCIAL

## 2023-05-26 ENCOUNTER — TELEPHONE (OUTPATIENT)
Dept: CARDIOLOGY | Facility: CLINIC | Age: 63
End: 2023-05-26
Payer: COMMERCIAL

## 2023-05-26 ENCOUNTER — TELEPHONE (OUTPATIENT)
Dept: INTERNAL MEDICINE | Facility: CLINIC | Age: 63
End: 2023-05-26

## 2023-05-26 VITALS
WEIGHT: 176.4 LBS | BODY MASS INDEX: 30.11 KG/M2 | HEIGHT: 64 IN | OXYGEN SATURATION: 97 % | DIASTOLIC BLOOD PRESSURE: 66 MMHG | HEART RATE: 77 BPM | SYSTOLIC BLOOD PRESSURE: 104 MMHG

## 2023-05-26 DIAGNOSIS — Z79.899 ENCOUNTER FOR LONG-TERM (CURRENT) USE OF OTHER MEDICATIONS: ICD-10-CM

## 2023-05-26 DIAGNOSIS — Z12.31 BREAST CANCER SCREENING BY MAMMOGRAM: ICD-10-CM

## 2023-05-26 DIAGNOSIS — I10 BENIGN ESSENTIAL HTN: Primary | ICD-10-CM

## 2023-05-26 DIAGNOSIS — E78.5 HYPERLIPIDEMIA LDL GOAL <70: ICD-10-CM

## 2023-05-26 DIAGNOSIS — Z23 NEED FOR COVID-19 VACCINE: ICD-10-CM

## 2023-05-26 DIAGNOSIS — Z12.11 COLON CANCER SCREENING: ICD-10-CM

## 2023-05-26 DIAGNOSIS — E11.65 TYPE 2 DIABETES MELLITUS WITH HYPERGLYCEMIA, WITHOUT LONG-TERM CURRENT USE OF INSULIN: ICD-10-CM

## 2023-05-26 PROCEDURE — 99214 OFFICE O/P EST MOD 30 MIN: CPT | Performed by: INTERNAL MEDICINE

## 2023-05-26 PROCEDURE — 80305 DRUG TEST PRSMV DIR OPT OBS: CPT | Performed by: INTERNAL MEDICINE

## 2023-05-26 RX ORDER — DULAGLUTIDE 4.5 MG/.5ML
4.5 INJECTION, SOLUTION SUBCUTANEOUS WEEKLY
Qty: 6 ML | Refills: 3 | Status: SHIPPED | OUTPATIENT
Start: 2023-05-26

## 2023-05-26 RX ORDER — FLUCONAZOLE 150 MG/1
150 TABLET ORAL
Qty: 5 TABLET | Refills: 0 | Status: SHIPPED | OUTPATIENT
Start: 2023-05-26

## 2023-05-26 NOTE — TELEPHONE ENCOUNTER
----- Message from Louie Jang Jr., MD sent at 5/26/2023  8:31 AM EDT -----  Can we get mammogram and record of PCV20 from [facility]?

## 2023-05-26 NOTE — TELEPHONE ENCOUNTER
----- Message from GABRIELLA Kim sent at 5/26/2023  8:13 AM EDT -----  Notify pt labs are good, continue current meds

## 2023-05-26 NOTE — PROGRESS NOTES
Chief Complaint  Hypertension (Follow up )    Subjective          Silvia Massey presents to [facility]  History of Present Illness  Patient reports having cataract surgery recently. She is having some issues with eyelid spasms. Patient reports vision has not returned to normal.   hypertension- patient denies Has, dizziness, chest pain.   hyperlipidemia- doing well on statin and aspirin.   DM2- patient is exercising more often. Patient has cut back on carb intake as well. Patient is losing weight. Patient reports eating more pasta recently. Patient reports taking trulicity 1.5mg recently instead of 4.5mg.   CHF- follow-up with cardiology since last visit. Patient reports stopping entresto and switched to lisinopril.   colonoscopy scheduled. Patient reports completing mammogram and pap and PCV[facility].     Current Outpatient Medications   Medication Instructions   • aspirin (EQ ASPIRIN ADULT LOW DOSE) 81 mg, Oral, Daily   • baclofen (LIORESAL) 10 MG tablet TAKE 1 TABLET BY MOUTH THREE TIMES DAILY   • carvedilol (COREG) 6.25 mg, Oral, 2 Times Daily   • Farxiga 10 MG tablet TAKE 1 TABLET BY MOUTH ONCE DAILY IN THE MORNING   • fluconazole (DIFLUCAN) 150 mg, Oral, Every 3 Days   • gabapentin (NEURONTIN) 600 mg, Oral, Nightly PRN   • lisinopril (PRINIVIL,ZESTRIL) 20 mg, Oral, Daily   • meloxicam (MOBIC) 15 mg, Oral, Daily   • prednisoLONE acetate (prednisoLONE Acetate P-F) 1 % ophthalmic suspension 1 drop, Both Eyes, Every 4 Hours   • rosuvastatin (CRESTOR) 10 MG tablet TAKE 1 TABLET BY MOUTH ONCE DAILY AT BEDTIME   • spironolactone (ALDACTONE) 25 mg, Oral, Daily   • traZODone (DESYREL) 50 mg, Oral, Nightly   • triamcinolone (KENALOG) 0.1 % ointment 1 application, Topical, 2 Times Daily   • Trulicity 4.5 mg, Subcutaneous, Weekly   • vitamin D (ERGOCALCIFEROL) 50,000 Units, Oral, Weekly       The following portions of the patient's history were reviewed and updated as  "appropriate: allergies, current medications, past family history, past medical history, past social history, past surgical history, and problem list.    Objective   Vital Signs:   /66 (BP Location: Left arm)   Pulse 77   Ht 162.6 cm (64\")   Wt 80 kg (176 lb 6.4 oz)   SpO2 97%   BMI 30.28 kg/m²     Wt Readings from Last 3 Encounters:   05/26/23 80 kg (176 lb 6.4 oz)   05/08/23 81.6 kg (180 lb)   05/02/23 81.6 kg (180 lb)     BP Readings from Last 3 Encounters:   05/26/23 104/66   05/08/23 121/67   12/07/22 121/67     Physical Exam   Appearance: No acute distress, well-nourished  Head: normocephalic, atraumatic  Eyes: extraocular movements intact, no scleral icterus, no conjunctival injection  Ears, Nose, and Throat: external ears normal, nares patent, moist mucous membranes  Cardiovascular: regular rate and rhythm. no murmurs, rubs, or gallops. no edema  Respiratory: breathing comfortably, symmetric chest rise, clear to auscultation bilaterally. No wheezes, rales, or rhonchi.  Neuro: alert and oriented to time, place, and person. Normal gait  Psych: normal mood and affect     Result Review :   The following data was reviewed by: Louie Jang Jr, MD on 08/26/2022:  Common labs        6/27/2022    00:00 8/26/2022    09:25 5/25/2023    09:16   Common Labs   Glucose  99   110     BUN  24   21     Creatinine  0.78   0.83     Sodium  142   142     Potassium  4.2   4.6     Chloride  108   104     Calcium  10.2   10.7     Albumin  4.70   5.0     Total Bilirubin  0.3   0.2     Alkaline Phosphatase  47   56     AST (SGOT)  19   17     ALT (SGPT)  16   15     WBC  4.64   5.83     Hemoglobin  13.8   13.5     Hematocrit  42.0   41.5     Platelets  214   236     Total Cholesterol  128   109     Triglycerides  60   68     HDL Cholesterol  68   62     LDL Cholesterol  52      47   33     Hemoglobin A1C 6.30      6.10   6.50     Microalbumin, Urine   1.4         This result is from an external source.     Lab " Results   Component Value Date    INR 0.99 (L) 05/11/2021       Last Urine Toxicity         Latest Ref Rng & Units 5/26/2023 5/25/2023   LAST URINE TOXICITY RESULTS   Creatinine, Urine mg/dL  103.9     Amphetamine, Urine Qual Negative Negative      Barbiturates Screen, Urine Negative Negative      Benzodiazepine Screen, Urine Negative Negative      Buprenorphine, Screen, Urine Negative Negative      Cocaine Screen, Urine Negative Negative      Methadone Screen , Urine Negative Negative      Methamphetamine, Ur Negative Negative                   Assessment and Plan    Diagnoses and all orders for this visit:    1. Benign essential HTN (Primary)  Comments:  well controlled on regimen    2. Hyperlipidemia LDL goal <70  Comments:  cont statin. LDL at goal.     3. Type 2 diabetes mellitus with hyperglycemia, without long-term current use of insulin  Comments:  good control on recent HgbA1c. cont regimen.   Orders:  -     Dulaglutide (Trulicity) 4.5 MG/0.5ML solution pen-injector; Inject 0.5 mL under the skin into the appropriate area as directed 1 (One) Time Per Week.  Dispense: 6 mL; Refill: 3    4. Breast cancer screening by mammogram  Comments:  pt reports obtained at OSH and will obtain records.     5. Colon cancer screening  -     Ambulatory Referral For Screening Colonoscopy    6. Need for COVID-19 vaccine  Comments:  pt defers today    7. Encounter for long-term (current) use of other medications  -     POC Urine Drug Screen [facility]    Other orders  -     fluconazole (Diflucan) 150 MG tablet; Take 1 tablet by mouth Every 3 (Three) Days.  Dispense: 5 tablet; Refill: 0          Medications Discontinued During This Encounter   Medication Reason   • cyclopentolate (Cyclogyl) 1 % ophthalmic solution *Therapy completed   • Diclofenac Sodium (Voltaren) 1 % gel gel *Therapy completed   • fluconazole (Diflucan) 150 MG tablet *Therapy completed   • oxyCODONE-acetaminophen (PERCOCET) 5-325 MG per tablet *Therapy  completed   • PEG-3350/Electrolytes/Ascorbat 100 g reconstituted solution powder *Therapy completed   • betamethasone dipropionate (DIPROLENE) 0.05 % lotion *Therapy completed   • sacubitril-valsartan (Entresto) 49-51 MG tablet *Therapy completed   • sacubitril-valsartan (Entresto)  MG tablet *Therapy completed   • Dulaglutide (Trulicity) 4.5 MG/0.5ML solution pen-injector Reorder        Follow Up   Return in about 6 months (around 11/26/2023) for HTN, DM, Recheck.  Patient was given instructions and counseling regarding her condition or for health maintenance advice. Please see specific information pulled into the AVS if appropriate.       Louie Jang Jr, MD  05/26/23  10:43 EDT

## 2023-06-01 ENCOUNTER — HOSPITAL ENCOUNTER (OUTPATIENT)
Dept: MRI IMAGING | Facility: HOSPITAL | Age: 63
Discharge: HOME | End: 2023-06-01

## 2023-06-01 DIAGNOSIS — M75.52 BILATERAL SHOULDER BURSITIS: ICD-10-CM

## 2023-06-01 DIAGNOSIS — M75.51 BILATERAL SHOULDER BURSITIS: ICD-10-CM

## 2023-06-01 DIAGNOSIS — M75.81 ROTATOR CUFF TENDONITIS, RIGHT: ICD-10-CM

## 2023-06-01 DIAGNOSIS — M25.511 RIGHT SHOULDER PAIN, UNSPECIFIED CHRONICITY: ICD-10-CM

## 2023-06-01 PROCEDURE — 73221 MRI JOINT UPR EXTREM W/O DYE: CPT

## 2023-06-13 RX ORDER — ERGOCALCIFEROL 1.25 MG/1
50000 CAPSULE ORAL WEEKLY
Qty: 13 CAPSULE | Refills: 0 | Status: SHIPPED | OUTPATIENT
Start: 2023-06-13

## 2023-07-25 ENCOUNTER — TREATMENT (OUTPATIENT)
Dept: PHYSICAL THERAPY | Facility: CLINIC | Age: 63
End: 2023-07-25
Payer: COMMERCIAL

## 2023-07-25 DIAGNOSIS — M75.21 BICIPITAL TENDINITIS OF RIGHT SHOULDER: Primary | ICD-10-CM

## 2023-07-25 DIAGNOSIS — S43.431A SUPERIOR GLENOID LABRUM LESION OF RIGHT SHOULDER, INITIAL ENCOUNTER: ICD-10-CM

## 2023-07-25 DIAGNOSIS — M75.52 BURSITIS OF BOTH SHOULDERS: ICD-10-CM

## 2023-07-25 DIAGNOSIS — M75.51 BURSITIS OF BOTH SHOULDERS: ICD-10-CM

## 2023-07-25 PROCEDURE — 97530 THERAPEUTIC ACTIVITIES: CPT | Performed by: OCCUPATIONAL THERAPIST

## 2023-07-25 PROCEDURE — 97110 THERAPEUTIC EXERCISES: CPT | Performed by: OCCUPATIONAL THERAPIST

## 2023-07-25 NOTE — PROGRESS NOTES
"Occupational Therapy Daily Treatment Note  Aly[place] OT: [address] [place] [address]      Patient: Silvia Massey   : 1960  Diagnosis/ICD-10 Code:  Bicipital tendinitis of right shoulder [M75.21]  Referring practitioner: Arnulfo Armijo MD  Date of Initial Visit: Type: THERAPY  Noted: 2023  Today's Date: 2023  Patient seen for 6 sessions             Subjective   Silvia Massey reports: 7/10 pain R shoulder 4/10 pain L shoulder. \"I think it's the way I'm sleeping\"    Objective     See Exercise, Manual, and Modality Logs for complete treatment.       Assessment/Plan  Pt reports poor tolerance to Tband resisted abd. OT encouraged pt to complete isometrically. Pt tolerated remainder of treatment well. OT graded up rows/shoulder 6 way.  Progress per Plan of Care           Timed:  Therapeutic Exercise:    20     mins  04902;     Therapeutic Activity:     10     mins  10434;       Timed Treatment:   30   mins   Total Treatment:     30   mins        Jean-Paul Flores OT  Occupational Therapist  [place] License: [id number]  NPI: [id number]  "

## 2023-07-27 ENCOUNTER — TREATMENT (OUTPATIENT)
Dept: PHYSICAL THERAPY | Facility: CLINIC | Age: 63
End: 2023-07-27
Payer: COMMERCIAL

## 2023-07-27 DIAGNOSIS — M75.51 BURSITIS OF BOTH SHOULDERS: ICD-10-CM

## 2023-07-27 DIAGNOSIS — M75.52 BURSITIS OF BOTH SHOULDERS: ICD-10-CM

## 2023-07-27 DIAGNOSIS — M75.21 BICIPITAL TENDINITIS OF RIGHT SHOULDER: Primary | ICD-10-CM

## 2023-07-27 DIAGNOSIS — S43.431A SUPERIOR GLENOID LABRUM LESION OF RIGHT SHOULDER, INITIAL ENCOUNTER: ICD-10-CM

## 2023-07-27 NOTE — PROGRESS NOTES
"Occupational Therapy Daily Treatment Note  Alysa OT: [address] [place] [address]      Patient: Silvia Massey   : 1960  Diagnosis/ICD-10 Code:  Bicipital tendinitis of right shoulder [M75.21]  Referring practitioner: Arnulfo Armijo MD  Date of Initial Visit: Type: THERAPY  Noted: 2023  Today's Date: 2023  Patient seen for 7 sessions             Subjective   Silvia Massey reports: \"It's okay\" 6/10 R shoulder, 4/10 L shoulder.    Objective     See Exercise, Manual, and Modality Logs for complete treatment.       Assessment/Plan  OT provided SA punch HEP. Teachback successful. Pt tolerated treatment well this date.  Progress per Plan of Care           Timed:  Therapeutic Exercise:    25     mins  39890;     Therapeutic Activity:     15     mins  67942;       Timed Treatment:   40   mins   Total Treatment:     40   mins        Jean-Paul Flores OT  Occupational Therapist  [place] License: [id number]  NPI: [id number]  "

## 2023-07-31 RX ORDER — DAPAGLIFLOZIN 10 MG/1
TABLET, FILM COATED ORAL
Qty: 90 TABLET | Refills: 0 | Status: SHIPPED | OUTPATIENT
Start: 2023-07-31

## 2023-08-01 ENCOUNTER — TREATMENT (OUTPATIENT)
Dept: PHYSICAL THERAPY | Facility: CLINIC | Age: 63
End: 2023-08-01
Payer: COMMERCIAL

## 2023-08-01 DIAGNOSIS — S43.431A SUPERIOR GLENOID LABRUM LESION OF RIGHT SHOULDER, INITIAL ENCOUNTER: ICD-10-CM

## 2023-08-01 DIAGNOSIS — M75.51 BURSITIS OF BOTH SHOULDERS: ICD-10-CM

## 2023-08-01 DIAGNOSIS — M75.52 BURSITIS OF BOTH SHOULDERS: ICD-10-CM

## 2023-08-01 DIAGNOSIS — M75.21 BICIPITAL TENDINITIS OF RIGHT SHOULDER: Primary | ICD-10-CM

## 2023-08-03 ENCOUNTER — TREATMENT (OUTPATIENT)
Dept: PHYSICAL THERAPY | Facility: CLINIC | Age: 63
End: 2023-08-03
Payer: COMMERCIAL

## 2023-08-03 DIAGNOSIS — S43.431A SUPERIOR GLENOID LABRUM LESION OF RIGHT SHOULDER, INITIAL ENCOUNTER: ICD-10-CM

## 2023-08-03 DIAGNOSIS — M75.51 BURSITIS OF BOTH SHOULDERS: ICD-10-CM

## 2023-08-03 DIAGNOSIS — M75.21 BICIPITAL TENDINITIS OF RIGHT SHOULDER: Primary | ICD-10-CM

## 2023-08-03 DIAGNOSIS — M75.52 BURSITIS OF BOTH SHOULDERS: ICD-10-CM

## 2023-08-08 ENCOUNTER — TREATMENT (OUTPATIENT)
Dept: PHYSICAL THERAPY | Facility: CLINIC | Age: 63
End: 2023-08-08
Payer: COMMERCIAL

## 2023-08-08 DIAGNOSIS — S43.431A SUPERIOR GLENOID LABRUM LESION OF RIGHT SHOULDER, INITIAL ENCOUNTER: ICD-10-CM

## 2023-08-08 DIAGNOSIS — M75.52 BURSITIS OF BOTH SHOULDERS: ICD-10-CM

## 2023-08-08 DIAGNOSIS — M75.51 BURSITIS OF BOTH SHOULDERS: ICD-10-CM

## 2023-08-08 DIAGNOSIS — M75.21 BICIPITAL TENDINITIS OF RIGHT SHOULDER: Primary | ICD-10-CM

## 2023-08-08 NOTE — PROGRESS NOTES
"Occupational Therapy Daily Treatment Note  Alysa OT: [address] [place] [address]      Patient: Silvia Massey   : 1960  Diagnosis/ICD-10 Code:  Bicipital tendinitis of right shoulder [M75.21]  Referring practitioner: Arnulfo Armijo MD  Date of Initial Visit: Type: THERAPY  Noted: 2023  Today's Date: 2023  Patient seen for 10 sessions             Subjective   Silvia Massey reports: \"They're okay\" 6/10 pain R shoulder. 5/10 pain L shoulder.    Objective     See Exercise, Manual, and Modality Logs for complete treatment.       Assessment/Plan  Pt tolerated well this date with no c/o increased pain.  Progress per Plan of Care           Timed:  Therapeutic Exercise:    25     mins  14147;     Therapeutic Activity:     13     mins  87102;       Timed Treatment:   38   mins   Total Treatment:     38   mins        Jean-Paul Flores OT  Occupational Therapist  [place] License: [id number]  NPI: [id number]  "

## 2023-08-10 ENCOUNTER — TREATMENT (OUTPATIENT)
Dept: PHYSICAL THERAPY | Facility: CLINIC | Age: 63
End: 2023-08-10
Payer: COMMERCIAL

## 2023-08-10 DIAGNOSIS — M75.52 BURSITIS OF BOTH SHOULDERS: ICD-10-CM

## 2023-08-10 DIAGNOSIS — M75.51 BURSITIS OF BOTH SHOULDERS: ICD-10-CM

## 2023-08-10 DIAGNOSIS — S43.431A SUPERIOR GLENOID LABRUM LESION OF RIGHT SHOULDER, INITIAL ENCOUNTER: ICD-10-CM

## 2023-08-10 DIAGNOSIS — M75.21 BICIPITAL TENDINITIS OF RIGHT SHOULDER: Primary | ICD-10-CM

## 2023-08-10 NOTE — PROGRESS NOTES
Occupational Therapy Daily Treatment Note  Alysa OT: [address] [place] [address]      Patient: Silvia Massey   : 1960  Diagnosis/ICD-10 Code:  Bicipital tendinitis of right shoulder [M75.21]  Referring practitioner: Arnulfo Armijo MD  Date of Initial Visit: Type: THERAPY  Noted: 2023  Today's Date: 8/10/2023  Patient seen for 11 sessions             Subjective   Silvia Massey reports: 9.5/10 pain R shoulder. Pt reports she reached into the back seat of the car yesterday and felt a sharp pain and it has been hurting since.    Objective     See Exercise, Manual, and Modality Logs for complete treatment.       Assessment/Plan  Pt reports pain with rows. OT graded activity down to cybex 2. Pt reports improved tolerance.  Progress per Plan of Care           Timed:  Therapeutic Exercise:    13     mins  96593;     Therapeutic Activity:     10     mins  71243;       Timed Treatment:   23   mins   Total Treatment:     35   mins        Jean-Paul Flores OT  Occupational Therapist  [place] License: [id number]  NPI: [id number]

## 2023-08-15 ENCOUNTER — TREATMENT (OUTPATIENT)
Dept: PHYSICAL THERAPY | Facility: CLINIC | Age: 63
End: 2023-08-15
Payer: COMMERCIAL

## 2023-08-15 DIAGNOSIS — M75.21 BICIPITAL TENDINITIS OF RIGHT SHOULDER: Primary | ICD-10-CM

## 2023-08-15 DIAGNOSIS — S43.431A SUPERIOR GLENOID LABRUM LESION OF RIGHT SHOULDER, INITIAL ENCOUNTER: ICD-10-CM

## 2023-08-15 DIAGNOSIS — M75.51 BURSITIS OF BOTH SHOULDERS: ICD-10-CM

## 2023-08-15 DIAGNOSIS — M75.52 BURSITIS OF BOTH SHOULDERS: ICD-10-CM

## 2023-08-15 NOTE — PROGRESS NOTES
Occupational Therapy Daily Treatment Note  [address] [facility]   [place]  [address]      Patient: Silvia Massey   : 1960  Referring practitioner: Arnulfo Armijo MD  Date of Initial Visit: Type: THERAPY  Noted: 2023  Today's Date: 8/15/2023  Patient seen for 12 sessions           Subjective       Objective   See Exercise, Manual, and Modality Logs for complete treatment.       Assessment & Plan       Assessment  Assessment details: Good tolerance to exercise.  Discomfort fluctuates.  Continue with plan of care.      Visit Diagnoses:    ICD-10-CM ICD-9-CM   1. Bicipital tendinitis of right shoulder  M75.21 726.12   2. Superior glenoid labrum lesion of right shoulder, initial encounter  S43.431A 840.7   3. Bursitis of both shoulders  M75.51 726.10    M75.52        Progress per Plan of Care           Timed:  Manual Therapy:                 0     mins  71680  Therapeutic Exercise:      20     mins  61132     Neuromuscular reeducation       0     mins 99899  Therapeutic Activity              10     mins  37315  Ultrasound:                  0     mins  81468     Untimed:  Electrical Stimulation:    0     mins  09368 ( );  Fluidotherapy      0     mins  88335    Timed Treatment:   30  mins   Total Treatment:     30   mins    Meri Monzon OT, CHT  Occupational Therapist    Electronically signed      [place] license #: [id number]

## 2023-09-05 ENCOUNTER — TELEPHONE (OUTPATIENT)
Dept: SURGERY | Facility: CLINIC | Age: 63
End: 2023-09-05
Payer: COMMERCIAL

## 2023-09-05 ENCOUNTER — ANESTHESIA EVENT (OUTPATIENT)
Dept: GASTROENTEROLOGY | Facility: HOSPITAL | Age: 63
End: 2023-09-05
Payer: COMMERCIAL

## 2023-09-05 DIAGNOSIS — K21.9 GASTROESOPHAGEAL REFLUX DISEASE WITHOUT ESOPHAGITIS: Primary | ICD-10-CM

## 2023-09-05 DIAGNOSIS — Z12.11 SCREENING FOR MALIGNANT NEOPLASM OF COLON: ICD-10-CM

## 2023-09-05 RX ORDER — PEG-3350, SODIUM SULFATE, SODIUM CHLORIDE, POTASSIUM CHLORIDE, SODIUM ASCORBATE AND ASCORBIC ACID 7.5-2.691G
1000 KIT ORAL EVERY 12 HOURS
Qty: 1000 ML | Refills: 0 | Status: ON HOLD | OUTPATIENT
Start: 2023-09-05 | End: 2023-09-06

## 2023-09-05 RX ORDER — ERGOCALCIFEROL 1.25 MG/1
50000 CAPSULE ORAL WEEKLY
Qty: 13 CAPSULE | Refills: 1 | Status: SHIPPED | OUTPATIENT
Start: 2023-09-05

## 2023-09-05 NOTE — TELEPHONE ENCOUNTER
Hub staff attempted to follow warm transfer process and was unsuccessful     Caller: Silvia Massey    Relationship to patient: Self    Best call back number: [phone]     Patient is needing: TO SPEAK WITH A SURGERY SCHEDULER REGARDING TIME TO BE AT THE HOSPITAL AND NEEDS PREP INFORMATION

## 2023-09-05 NOTE — TELEPHONE ENCOUNTER
Spoke with patient and advised of arrival time and I uploaded bowel prep instructions to her mychart.

## 2023-09-05 NOTE — TELEPHONE ENCOUNTER
"Caller: SACHA COOMBS    Relationship: SELF    Best call back number: [phone]    Patient is needing: PT IS CONCERNED BECAUSE SHE SAYS HER APPOINTMENT FOR SURGERY TOMORROW IS LISTED AS A \"DIAGNOSTIC\" INSTEAD OF A \"FOLLOW UP\"   PT SAID PER HER PCP SHE NEEDS TO BE SCHEDULED AS A \"FOLLOW UP\" INSTEAD OF A \"DIAGNOSTIC\"     PT ALSO SAID THAT SHE DOES NOT NEED AN EGD WITH THIS COLONOSCOPY.    "

## 2023-09-05 NOTE — TELEPHONE ENCOUNTER
Spoke with patient again in regards to the billing of her colonoscopy, I advised it was being billed as a screening colonoscopy. Advised that is what the referral stated that Dr. Jang sent over as well. Advised the EGD portion of the procedure had already been cancelled per pt request in June and that she would not be billed for and EGD on 9/6/23. Pt v/u and had no other questions.

## 2023-09-06 ENCOUNTER — TELEPHONE (OUTPATIENT)
Dept: INTERNAL MEDICINE | Facility: CLINIC | Age: 63
End: 2023-09-06
Payer: COMMERCIAL

## 2023-09-06 ENCOUNTER — ANESTHESIA (OUTPATIENT)
Dept: GASTROENTEROLOGY | Facility: HOSPITAL | Age: 63
End: 2023-09-06
Payer: COMMERCIAL

## 2023-09-06 ENCOUNTER — HOSPITAL ENCOUNTER (OUTPATIENT)
Facility: HOSPITAL | Age: 63
Setting detail: HOSPITAL OUTPATIENT SURGERY
Discharge: HOME | End: 2023-09-06
Attending: SURGERY | Admitting: SURGERY
Payer: COMMERCIAL

## 2023-09-06 VITALS
WEIGHT: 182.1 LBS | RESPIRATION RATE: 20 BRPM | TEMPERATURE: 97.8 F | DIASTOLIC BLOOD PRESSURE: 74 MMHG | HEART RATE: 74 BPM | BODY MASS INDEX: 31.09 KG/M2 | OXYGEN SATURATION: 99 % | HEIGHT: 64 IN | SYSTOLIC BLOOD PRESSURE: 127 MMHG

## 2023-09-06 PROCEDURE — 82948 REAGENT STRIP/BLOOD GLUCOSE: CPT

## 2023-09-06 PROCEDURE — 25010000002 PROPOFOL 10 MG/ML EMULSION: Performed by: NURSE ANESTHETIST, CERTIFIED REGISTERED

## 2023-09-06 RX ORDER — SODIUM CHLORIDE, SODIUM LACTATE, POTASSIUM CHLORIDE, CALCIUM CHLORIDE 600; 310; 30; 20 MG/100ML; MG/100ML; MG/100ML; MG/100ML
30 INJECTION, SOLUTION INTRAVENOUS CONTINUOUS
Status: DISCONTINUED | OUTPATIENT
Start: 2023-09-06 | End: 2023-09-06 | Stop reason: HOSPADM

## 2023-09-06 RX ORDER — LIDOCAINE HYDROCHLORIDE 20 MG/ML
INJECTION, SOLUTION EPIDURAL; INFILTRATION; INTRACAUDAL; PERINEURAL AS NEEDED
Status: DISCONTINUED | OUTPATIENT
Start: 2023-09-06 | End: 2023-09-06 | Stop reason: SURG

## 2023-09-06 RX ORDER — PROPOFOL 10 MG/ML
VIAL (ML) INTRAVENOUS AS NEEDED
Status: DISCONTINUED | OUTPATIENT
Start: 2023-09-06 | End: 2023-09-06 | Stop reason: SURG

## 2023-09-06 RX ADMIN — PROPOFOL 165 MCG/KG/MIN: 10 INJECTION, EMULSION INTRAVENOUS at 09:08

## 2023-09-06 RX ADMIN — PROPOFOL 80 MG: 10 INJECTION, EMULSION INTRAVENOUS at 09:08

## 2023-09-06 RX ADMIN — SODIUM CHLORIDE, POTASSIUM CHLORIDE, SODIUM LACTATE AND CALCIUM CHLORIDE 30 ML/HR: 600; 310; 30; 20 INJECTION, SOLUTION INTRAVENOUS at 08:22

## 2023-09-06 RX ADMIN — LIDOCAINE HYDROCHLORIDE 60 MG: 20 INJECTION, SOLUTION EPIDURAL; INFILTRATION; INTRACAUDAL; PERINEURAL at 09:08

## 2023-09-06 NOTE — ANESTHESIA POSTPROCEDURE EVALUATION
Patient: Silvia Massey    Procedure Summary       Date: 09/06/23 Room / Location: [facility] END[facility]COPY 3 / [facility]    Anesthesia Start: 0905 Anesthesia Stop: 0927    Procedure: COLONOSCOPY Diagnosis:       GERD without esophagitis      Screening for malignant neoplasm of colon      (GERD without esophagitis [K21.9])      (Screening for malignant neoplasm of colon [Z12.11])    Surgeons: Melecio Maldonado MD Provider: Silvio Jefferson CRNA    Anesthesia Type: general ASA Status: 3            Anesthesia Type: general    Vitals  Vitals Value Taken Time   /74 09/06/23 0946   Temp 36.6 °C (97.8 °F) 09/06/23 0925   Pulse 73 09/06/23 0947   Resp 20 09/06/23 0946   SpO2 98 % 09/06/23 0947   Vitals shown include unvalidated device data.        Post Anesthesia Care and Evaluation    Patient location during evaluation: bedside  Patient participation: complete - patient participated  Level of consciousness: awake  Pain management: adequate    Airway patency: patent  Anesthetic complications: No anesthetic complications  PONV Status: controlled  Cardiovascular status: acceptable and stable  Respiratory status: acceptable

## 2023-09-06 NOTE — TELEPHONE ENCOUNTER
Caller: Silvia Massey    Relationship to patient: Self    Best call back number: [phone]     Patient is needing: PATIENT STATED THAT SHE IS WANTING TO KNOW IF DR GALLEGOS THOUGHT THAT MOUNJARO WOULD BE A GOOD DIABETIC MEDICATION TO CHANGE TO FROM THE TRULICITY THAT SHE IS ON NOW.

## 2023-09-06 NOTE — ANESTHESIA PREPROCEDURE EVALUATION
Anesthesia Evaluation     Patient summary reviewed and Nursing notes reviewed   NPO Solid Status: > 8 hours  NPO Liquid Status: > 8 hours           Airway   Mallampati: I  TM distance: >3 FB  Neck ROM: limited  No difficulty expected  Dental      Pulmonary     breath sounds clear to auscultation  (+) pneumonia ,shortness of breath, sleep apnea on CPAP  Cardiovascular     ECG reviewed  Rhythm: regular    (+) hypertension well controlled, CAD, dysrhythmias, CHF , hyperlipidemia    ROS comment: LBBB- non occlusive left coronary artery disease    Neuro/Psych  (+) numbness, psychiatric history  GI/Hepatic/Renal/Endo    (+) GERD, diabetes mellitus type 2 well controlled    Musculoskeletal     Abdominal    Substance History      OB/GYN          Other   arthritis,                     Anesthesia Plan    ASA 3     general   total IV anesthesia  intravenous induction     Anesthetic plan, risks, benefits, and alternatives have been provided, discussed and informed consent has been obtained with: patient and spouse/significant other.  Pre-procedure education provided  Plan discussed with CRNA.    CODE STATUS:

## 2023-09-06 NOTE — H&P
General Surgery/Colorectal Surgery Note    Patient Name:  Silvia Massey  YOB: 1960  [id number]    Referring Provider: Melecio Maldonado, *      Patient Care Team:  Louie Jang Jr., MD as PCP - General (Internal Medicine)    Subjective .     History of present illness:    HPI    referral from Dr. Louie Jang for an EGD and colonoscopy consultation.  Patient reports that she has with heartburn and indigestion 4 out of 7 days.  She reports that she has been taken some OCT meds to control her symptoms.  She denies any dysphagia, epigastric pain or melena.     Denies any lower abdominal pain, change in bowel habit, or rectal bleeding.     Patient has a history of a left bundle branch block and CHF.  I will get cardiac clearance from Dr. Santana prior prior to the procedure.     10/17: EGD & Colonoscopy ([facility]): Normal EGD; internal hemorrhoids, otherwise normal.     History:  Past Medical History:   Diagnosis Date    Anemia     Arthritis     Arthritis of back     CHF (congestive heart failure)     CTS (carpal tunnel syndrome) 2012    Depression     Diabetes mellitus     type 2    Dyspnea     Forgetfulness     GERD (gastroesophageal reflux disease)     History of transfusion     Hypertension     Night sweats     Nonischemic cardiomyopathy 05/16/2022    Nonocclusive CAD 05/16/2022    Periarthritis of shoulder 2010    Pneumonia     Sinus trouble     Sleep apnea     SOB (shortness of breath)     Voice hoarseness        Past Surgical History:   Procedure Laterality Date    BRONCHOSCOPY N/A 10/18/2018    Procedure: BRONCHOSCOPY WITH BIOPSIES, BAL;  Surgeon: Rayray Black MD;  Location: [facility] ENDOSCOPY;  Service: Pulmonary    COLONOSCOPY  2018    [facility]     ENDOSCOPY  2017    EYE SURGERY  07/09/2019    blepharoplasty    LYMPHANGIOMA EXCISION      OOPHORECTOMY      SINUS SURGERY      TUBAL ABDOMINAL LIGATION         Family History   Problem Relation Age of Onset    Cancer Mother      Heart disease Father     Diabetes Brother     Heart disease Brother     Diabetes Other     Diabetes Other     Uterine cancer Other        Social History     Tobacco Use    Smoking status: Former     Packs/day: 0.50     Years: 10.00     Pack years: 5.00     Types: Cigarettes     Start date: 1977     Quit date: 10/11/1987     Years since quittin.9    Smokeless tobacco: Former    Tobacco comments:     25 years ago    Vaping Use    Vaping Use: Never used   Substance Use Topics    Alcohol use: Yes     Alcohol/week: 2.0 standard drinks     Types: 2 Glasses of wine per week     Comment: occasionally drinks, 1 drink per day, has been drinking for 6-10 yrs,1 beer per night     Drug use: No       Review of Systems: See HPI    Review of Systems            Medications Prior to Admission   Medication Sig Dispense Refill Last Dose    baclofen (LIORESAL) 10 MG tablet TAKE 1 TABLET BY MOUTH THREE TIMES DAILY 90 tablet 2 2023    carvedilol (COREG) 6.25 MG tablet Take 1 tablet by mouth 2 (Two) Times a Day. 180 tablet 3 2023    Dulaglutide (Trulicity) 4.5 MG/0.5ML solution pen-injector Inject 0.5 mL under the skin into the appropriate area as directed 1 (One) Time Per Week. 6 mL 3 Past Week    EQ Aspirin Adult Low Dose 81 MG EC tablet Take 1 tablet by mouth once daily 30 tablet 0 2023    Farxiga 10 MG tablet TAKE 1 TABLET BY MOUTH ONCE DAILY IN THE MORNING 90 tablet 0 2023    gabapentin (NEURONTIN) 600 MG tablet Take 1 tablet by mouth At Night As Needed (pain). 90 tablet 0 Past Week    lisinopril (PRINIVIL,ZESTRIL) 40 MG tablet Take 1 tablet by mouth Daily. 90 tablet 3 2023    meloxicam (MOBIC) 15 MG tablet Take 1 tablet by mouth Daily. 30 tablet 2 Past Week    prednisoLONE acetate (prednisoLONE Acetate P-F) 1 % ophthalmic suspension Administer 1 drop to both eyes Every 4 (Four) Hours. 15 mL 0 2023    rosuvastatin (CRESTOR) 10 MG tablet TAKE 1 TABLET BY MOUTH ONCE DAILY AT BEDTIME 90 tablet 3 2023     vitamin D (ERGOCALCIFEROL) 1.25 MG (36816 UT) capsule capsule Take 1 capsule by mouth once a week 13 capsule 1 9/5/2023    traZODone (DESYREL) 50 MG tablet Take 1 tablet by mouth Every Night. 30 tablet 0 Unknown    triamcinolone (KENALOG) 0.1 % ointment Apply 1 application topically to the appropriate area as directed 2 (Two) Times a Day. 80 g 1 Unknown         Home Meds:      Prior to Admission medications    Medication Sig Start Date End Date Taking? Authorizing Provider   baclofen (LIORESAL) 10 MG tablet TAKE 1 TABLET BY MOUTH THREE TIMES DAILY 3/13/23  Yes Sumit Kerns MD   carvedilol (COREG) 6.25 MG tablet Take 1 tablet by mouth 2 (Two) Times a Day. 1/26/23  Yes Louie Jang Jr., MD   Dulaglutide (Trulicity) 4.5 MG/0.5ML solution pen-injector Inject 0.5 mL under the skin into the appropriate area as directed 1 (One) Time Per Week. 5/26/23  Yes Louie Jang Jr., MD   EQ Aspirin Adult Low Dose 81 MG EC tablet Take 1 tablet by mouth once daily 7/17/23  Yes Louie Jang Jr., MD   Farxiga 10 MG tablet TAKE 1 TABLET BY MOUTH ONCE DAILY IN THE MORNING 7/31/23  Yes Louie Jang Jr., MD   gabapentin (NEURONTIN) 600 MG tablet Take 1 tablet by mouth At Night As Needed (pain). 3/17/23  Yes Kay Rutledge APRN   lisinopril (PRINIVIL,ZESTRIL) 40 MG tablet Take 1 tablet by mouth Daily. 7/13/23  Yes Arnulfo Santana MD   meloxicam (MOBIC) 15 MG tablet Take 1 tablet by mouth Daily. 5/2/23  Yes Johnny Limon PA   prednisoLONE acetate (prednisoLONE Acetate P-F) 1 % ophthalmic suspension Administer 1 drop to both eyes Every 4 (Four) Hours. 10/9/22  Yes Liberty Bella APRN   rosuvastatin (CRESTOR) 10 MG tablet TAKE 1 TABLET BY MOUTH ONCE DAILY AT BEDTIME 4/3/23  Yes Louie Jang Jr., MD   vitamin D (ERGOCALCIFEROL) 1.25 MG (16880 UT) capsule capsule Take 1 capsule by mouth once a week 9/5/23  Yes Louie Jang Jr., MD    PEG-KCl-NaCl-NaSulf-Na Asc-C (PEG-3350/Electrolytes/Ascorbat) 100 g reconstituted solution powder Take 1,000 mL by mouth Every 12 (Twelve) Hours. 9/5/23 9/6/23 Yes Melecio Maldonado MD   traZODone (DESYREL) 50 MG tablet Take 1 tablet by mouth Every Night. 10/12/22   Louie Jang Jr., MD   triamcinolone (KENALOG) 0.1 % ointment Apply 1 application topically to the appropriate area as directed 2 (Two) Times a Day. 11/18/22   Louie Jang Jr., MD   fluconazole (Diflucan) 150 MG tablet Take 1 tablet by mouth Every 3 (Three) Days. 5/26/23 9/6/23  Louie Jang Jr., MD   spironolactone (ALDACTONE) 25 MG tablet Take 1 tablet by mouth once daily 6/30/23 9/6/23  Louie Jang Jr., MD            Allergies:  Metformin      Objective     Vital Signs   Temp:  [96.4 °F (35.8 °C)] 96.4 °F (35.8 °C)  Heart Rate:  [74] 74  Resp:  [18] 18  BP: (120)/(65) 120/65    Physical Exam:     Physical Exam    NAD, A/O x 4, normal circulation, normal respiration      Result Review :     Assessment & Plan     There are no diagnoses linked to this encounter.       Risks including bleeding, perforation, pain, infection, missed polyp(s). Benefits, and alternatives of EGD and Colonoscopy discussed with patient.  All questions answered.  Consent verified.         Melecio Maldonado MD  09/06/23 08:12 EDT

## 2023-09-07 ENCOUNTER — LAB (OUTPATIENT)
Dept: LAB | Facility: HOSPITAL | Age: 63
End: 2023-09-07
Payer: COMMERCIAL

## 2023-09-07 ENCOUNTER — TRANSCRIBE ORDERS (OUTPATIENT)
Dept: ADMINISTRATIVE | Facility: HOSPITAL | Age: 63
End: 2023-09-07
Payer: COMMERCIAL

## 2023-09-07 DIAGNOSIS — H59.031 CYSTOID MACULAR EDEMA OF RIGHT EYE FOLLOWING CATARACT SURGERY: ICD-10-CM

## 2023-09-07 DIAGNOSIS — E11.3393 DIABETIC VISUAL LOSS: SEVERE VISION IMPAIRMENT OF BOTH EYES, WITHOUT MACULAR EDEMA, WITH MODERATE NONPROLIFERATIVE RETINOPATHY, ASSOCIATED WITH TYPE 2 DIABETES MELLITUS: ICD-10-CM

## 2023-09-07 DIAGNOSIS — H25.813 COMBINED FORMS OF AGE-RELATED CATARACT OF BOTH EYES: ICD-10-CM

## 2023-09-07 DIAGNOSIS — H54.2X22 DIABETIC VISUAL LOSS: SEVERE VISION IMPAIRMENT OF BOTH EYES, WITHOUT MACULAR EDEMA, WITH MODERATE NONPROLIFERATIVE RETINOPATHY, ASSOCIATED WITH TYPE 2 DIABETES MELLITUS: ICD-10-CM

## 2023-09-07 DIAGNOSIS — Z79.84 LONG TERM (CURRENT) USE OF ORAL HYPOGLYCEMIC DRUGS: ICD-10-CM

## 2023-09-07 DIAGNOSIS — I42.8 NICM (NONISCHEMIC CARDIOMYOPATHY): ICD-10-CM

## 2023-09-07 DIAGNOSIS — H59.031 CYSTOID MACULAR EDEMA OF RIGHT EYE FOLLOWING CATARACT SURGERY: Primary | ICD-10-CM

## 2023-09-07 LAB
ANION GAP SERPL CALCULATED.3IONS-SCNC: 12 MMOL/L (ref 5–15)
BUN SERPL-MCNC: 21 MG/DL (ref 8–23)
BUN/CREAT SERPL: 26.3 (ref 7–25)
CALCIUM SPEC-SCNC: 10.6 MG/DL (ref 8.6–10.5)
CHLORIDE SERPL-SCNC: 109 MMOL/L (ref 98–107)
CHROMATIN AB SERPL-ACNC: 19 IU/ML (ref 0–14)
CO2 SERPL-SCNC: 22 MMOL/L (ref 22–29)
CREAT SERPL-MCNC: 0.8 MG/DL (ref 0.57–1)
EGFRCR SERPLBLD CKD-EPI 2021: 82.9 ML/MIN/1.73
GLUCOSE BLDC GLUCOMTR-MCNC: 113 MG/DL (ref 70–99)
GLUCOSE SERPL-MCNC: 134 MG/DL (ref 65–99)
POTASSIUM SERPL-SCNC: 3.9 MMOL/L (ref 3.5–5.2)
SODIUM SERPL-SCNC: 143 MMOL/L (ref 136–145)

## 2023-09-07 PROCEDURE — 36415 COLL VENOUS BLD VENIPUNCTURE: CPT

## 2023-09-07 PROCEDURE — 86480 TB TEST CELL IMMUN MEASURE: CPT

## 2023-09-07 PROCEDURE — 86038 ANTINUCLEAR ANTIBODIES: CPT

## 2023-09-07 PROCEDURE — 86037 ANCA TITER EACH ANTIBODY: CPT

## 2023-09-07 PROCEDURE — 86431 RHEUMATOID FACTOR QUANT: CPT

## 2023-09-07 PROCEDURE — 82164 ANGIOTENSIN I ENZYME TEST: CPT

## 2023-09-07 PROCEDURE — 83516 IMMUNOASSAY NONANTIBODY: CPT

## 2023-09-07 PROCEDURE — 80048 BASIC METABOLIC PNL TOTAL CA: CPT

## 2023-09-07 PROCEDURE — 85549 MURAMIDASE: CPT

## 2023-09-07 NOTE — TELEPHONE ENCOUNTER
I think it is reasonable. She has been doing well on the trulicity and they are similar class of medications. We can discuss more at upcoming appointment.

## 2023-09-08 ENCOUNTER — TELEPHONE (OUTPATIENT)
Dept: CARDIOLOGY | Facility: CLINIC | Age: 63
End: 2023-09-08
Payer: COMMERCIAL

## 2023-09-08 ENCOUNTER — PATIENT MESSAGE (OUTPATIENT)
Dept: INTERNAL MEDICINE | Facility: CLINIC | Age: 63
End: 2023-09-08
Payer: COMMERCIAL

## 2023-09-08 DIAGNOSIS — R76.8 RHEUMATOID FACTOR POSITIVE: Primary | ICD-10-CM

## 2023-09-08 LAB
ACE SERPL-CCNC: <15 U/L (ref 14–82)
ANA SER QL: NEGATIVE
C-ANCA TITR SER IF: NORMAL TITER
MYELOPEROXIDASE AB SER IA-ACNC: <0.2 UNITS (ref 0–0.9)
P-ANCA ATYPICAL TITR SER IF: NORMAL TITER
P-ANCA TITR SER IF: NORMAL TITER
PROTEINASE3 AB SER IA-ACNC: <0.2 UNITS (ref 0–0.9)

## 2023-09-08 NOTE — TELEPHONE ENCOUNTER
----- Message from GABRIELLA Kim sent at 9/8/2023  9:22 AM EDT -----  Renal function is stable, sodium and potassium are in normal ranges

## 2023-09-08 NOTE — TELEPHONE ENCOUNTER
From: Silvia Massey  To: Louie Jang  Sent: 9/8/2023 8:58 AM EDT  Subject: Rheumatoid Arthritis     Good morning Dr. Jang~ my lab work from yesterday was high…which means I have it. My cataracts procedure has gotten worse. I’ve received a 2d set of injections in my eyes. Which brought on the lab work for inflammation. My question to you is: what’s next for the arthritis? I’m also curious about Mounjaro . I understand it takes your appetite which helps you lose weight. Do you think this will be a good fit for me? The lab work didn’t look good overall. Help! I’m knew something wasn’t right. My arms, shoulders, thighs, legs and back feels drained…like there’s no muscles. I’ve changed my diet effective Tue. I’m waiting to hear from the eye specialist about what’s next now that we know it’s the Rheumatoid affecting my eyes. My vision is now 20/80 :-(

## 2023-09-09 LAB
GAMMA INTERFERON BACKGROUND BLD IA-ACNC: 0.01 IU/ML
M TB IFN-G BLD-IMP: NEGATIVE
M TB IFN-G CD4+ T-CELLS BLD-ACNC: 0.05 IU/ML
M TBIFN-G CD4+ CD8+T-CELLS BLD-ACNC: 0.16 IU/ML
MITOGEN IGNF BLD-ACNC: >10 IU/ML
QUANTIFERON INCUBATION: NORMAL
SERVICE CMNT-IMP: NORMAL

## 2023-09-11 ENCOUNTER — TELEPHONE (OUTPATIENT)
Dept: INTERNAL MEDICINE | Facility: CLINIC | Age: 63
End: 2023-09-11
Payer: COMMERCIAL

## 2023-09-11 DIAGNOSIS — E11.65 TYPE 2 DIABETES MELLITUS WITH HYPERGLYCEMIA, WITHOUT LONG-TERM CURRENT USE OF INSULIN: Primary | ICD-10-CM

## 2023-09-11 LAB — LYSOZYME SERPL-MCNC: 4.9 UG/ML (ref 3.6–8.1)

## 2023-09-11 NOTE — TELEPHONE ENCOUNTER
Caller: Silvia Massey    Relationship to patient: Self    Best call back number:     [phone] (Mobile)       Patient is needing: PATIENT CALLED IN AND IS REQUESTING PRESCRIPTION BE SENT TO [facility] WHICH DOES HAVE MEDICATION IN STOCK BUT WAS TOLD BY PHARMACIST THAT INSURANCE IS REQUESTING A CALL AND NEEDS MORE INFORMATION FROM DR. GALLEGOS. PATIENT IS WANTING TO HAVE THIS FILLED TODAY IF POSSIBLE AS SHE USUALLY TAKES MEDICATION ON MONDAY.  Tirzepatide 15 MG/0.5ML solution pen-injector  15 mg, Weekly       [facility] [address] [place] [address] - [phone] [facility] [phone]  [phone]      PATIENT SAID THAT IT IS OKAY TO LEAVE MESSAGE ON PHONE

## 2023-09-11 NOTE — TELEPHONE ENCOUNTER
Left message for patient to return call to clinic.    HUB TO READ:  Medication has been sent to [facility] as requested and PA will be started.

## 2023-09-12 NOTE — TELEPHONE ENCOUNTER
Deniedtoday  Your PA request has been denied. Additional information will be provided in the denial communication. (Message 1144)

## 2023-09-13 NOTE — TELEPHONE ENCOUNTER
Please notify patient.  We can discuss at upcoming Appointment.  In the meantime please continue taking Trulicity.

## 2023-09-18 ENCOUNTER — LAB (OUTPATIENT)
Dept: LAB | Facility: HOSPITAL | Age: 63
End: 2023-09-18
Payer: COMMERCIAL

## 2023-09-18 ENCOUNTER — PATIENT MESSAGE (OUTPATIENT)
Dept: INTERNAL MEDICINE | Facility: CLINIC | Age: 63
End: 2023-09-18
Payer: COMMERCIAL

## 2023-09-18 DIAGNOSIS — H20.9 UVEITIS: ICD-10-CM

## 2023-09-18 LAB
CHROMATIN AB SERPL-ACNC: 18 IU/ML (ref 0–14)
ERYTHROCYTE [SEDIMENTATION RATE] IN BLOOD: 8 MM/HR (ref 0–30)

## 2023-09-18 PROCEDURE — 85652 RBC SED RATE AUTOMATED: CPT

## 2023-09-18 PROCEDURE — 86431 RHEUMATOID FACTOR QUANT: CPT

## 2023-09-18 PROCEDURE — 86200 CCP ANTIBODY: CPT

## 2023-09-18 PROCEDURE — 36415 COLL VENOUS BLD VENIPUNCTURE: CPT

## 2023-09-18 PROCEDURE — 86038 ANTINUCLEAR ANTIBODIES: CPT

## 2023-09-19 ENCOUNTER — TELEPHONE (OUTPATIENT)
Dept: SURGERY | Facility: CLINIC | Age: 63
End: 2023-09-19
Payer: COMMERCIAL

## 2023-09-19 ENCOUNTER — DOCUMENTATION (OUTPATIENT)
Dept: PHYSICAL THERAPY | Facility: CLINIC | Age: 63
End: 2023-09-19
Payer: COMMERCIAL

## 2023-09-19 DIAGNOSIS — M75.21 BICIPITAL TENDINITIS OF RIGHT SHOULDER: Primary | ICD-10-CM

## 2023-09-19 DIAGNOSIS — M75.52 BURSITIS OF BOTH SHOULDERS: ICD-10-CM

## 2023-09-19 DIAGNOSIS — S43.431A SUPERIOR GLENOID LABRUM LESION OF RIGHT SHOULDER, INITIAL ENCOUNTER: ICD-10-CM

## 2023-09-19 DIAGNOSIS — M75.51 BURSITIS OF BOTH SHOULDERS: ICD-10-CM

## 2023-09-19 LAB
ANA SER QL: NEGATIVE
CCP IGA+IGG SERPL IA-ACNC: 2 UNITS (ref 0–19)

## 2023-09-19 RX ORDER — FLUCONAZOLE 150 MG/1
150 TABLET ORAL
Qty: 5 TABLET | Refills: 1 | Status: SHIPPED | OUTPATIENT
Start: 2023-09-19 | End: 2023-10-02

## 2023-09-19 NOTE — TELEPHONE ENCOUNTER
From: Silvia Massey  To: Louie Jang  Sent: 9/18/2023 12:02 PM EDT  Subject: Fluconazole/optometrist     Dr Jang~ I had the lab work done this morning. I’m now in need of fluconazole. It seems it’s taking three pills for treatment. Can you please call it in for me? I have an appt with my optometrist @0549 today. I’m not sure what’s going to happen. My eyes are at the same level. I don’t want any more injections. I will let you know how the visit goes. Thanks in advance. Can you give me a few refills please?

## 2023-09-19 NOTE — TELEPHONE ENCOUNTER
I CALLED THE PATIENT AND TOLD HER, PER DR. VERA, TAKE MIRALAX TWICE DAILY TIL HAVING BOWEL MOVEMENTS, THEN CAN TITRATE DOWN.

## 2023-09-19 NOTE — PROGRESS NOTES
Discharge Summary  Discharge Summary from Occupational Therapy Report  Alysa  OT: [address] [place] [address]    Dates  OT visit: 7/5/23 - 8/15/23  Number of Visits: 12     Discharge Status of Patient: See Progress Note dated 8/3/23    Goals: Partially Met    Discharge Plan: Continue with current home exercise program as instructed  Future need for rehabilitation activities    Comments Unanticipated discharge.    Date of Discharge 8/15/23        Jean-Paul Flores OT  Occupational Therapist  [place] License:[id number]

## 2023-09-19 NOTE — TELEPHONE ENCOUNTER
PATIENT HAD A COLONOSCOPY ON 09/06/23.  SHE SAID SHE HAS BEEN HAVING CONSTIPATION SINCE.  SHE HAD ONE BOWEL MOVEMENT.  SHE USED SOME SMOOTH MOVE YESTERDAY, AND ONLY PRODUCED A SMALL BOWEL MOVEMENT.    SHE SAID HER STOMACH IS HURTING AND SHE FEELS PAIN AT RECTUM.    SHE ASKED FOR SOMETHING TO BE PRESCRIBED THAT WILL  MOVE THE STOOL FAST.

## 2023-09-20 ENCOUNTER — TELEPHONE (OUTPATIENT)
Dept: INTERNAL MEDICINE | Facility: CLINIC | Age: 63
End: 2023-09-20
Payer: COMMERCIAL

## 2023-10-05 RX ORDER — VALSARTAN 80 MG/1
80 TABLET ORAL DAILY
Qty: 90 TABLET | Refills: 3 | Status: SHIPPED | OUTPATIENT
Start: 2023-10-05

## 2023-10-06 NOTE — TELEPHONE ENCOUNTER
Caller: Silvia Massey    Relationship to patient: Self    Best call back number: [phone]     Patient is needing: PATIENT IS CALLING TO INQUIRE IF THE FAX HAS BEEN RECEIVED AND IF IT BEING WORKED ON OR IS COMPLETED. PLEASE CALL TO ADVISE.

## 2023-10-06 NOTE — TELEPHONE ENCOUNTER
Caller: Silvia Massey    Relationship to patient: Self    Best call back number: [phone]     Patient is needing: PATIENT CALLED IN STATING HER INSURANCE TOLD HER A PRIOR AUTHORIZATION IS NEEDED FOR HER LINZESS TO BE APPROVED AND STATES THEY HAVE FAXED OVER THE REQUEST.

## 2023-10-09 ENCOUNTER — TELEPHONE (OUTPATIENT)
Dept: INTERNAL MEDICINE | Facility: CLINIC | Age: 63
End: 2023-10-09
Payer: COMMERCIAL

## 2023-10-09 NOTE — TELEPHONE ENCOUNTER
Caller: Silvia Massey    Relationship: Self    Best call back number: [phone]     Requested Prescriptions:   Requested Prescriptions     Pending Prescriptions Disp Refills    linaclotide (Linzess) 72 MCG capsule capsule 30 capsule 0     Sig: Take 1 capsule by mouth Every Morning Before Breakfast.        Pharmacy where request should be sent: [facility] [address] [place] [address] [address] [facility] [phone] [facility] [phone] FX     Last office visit with prescribing clinician: 5/26/2023   Last telemedicine visit with prescribing clinician: Visit date not found   Next office visit with prescribing clinician: 11/30/2023     Additional details provided by patient: PATIENT IS ASKING FOR THE PRIOR AUTHORIZATION TO BE CALLED IN DIRECTLY TO HER INSURANCE COMPANY STATING THEY HAVE TOLD HER THEY FAXED OVER THE REQUEST ON 10.5.23.    PATIENT STATES THAT IF THIS IS FAXED BACK TO THEM IT COULD TAKE 5 DAYS TO PROCESS AND IS ASKING FOR A CALL BE MADE WITH THE PRIOR AUTHORIZATION TO BE DONE ASAP.   [phone]       Does the patient have less than a 3 day supply:  [x] Yes  [] No    Would you like a call back once the refill request has been completed: [] Yes [] No    If the office needs to give you a call back, can they leave a voicemail: [] Yes [] No    Kennedi Dyson, PCT   10/09/23 14:37 EDT

## 2023-10-10 ENCOUNTER — TELEPHONE (OUTPATIENT)
Dept: UROLOGY | Facility: CLINIC | Age: 63
End: 2023-10-10
Payer: COMMERCIAL

## 2023-10-10 NOTE — TELEPHONE ENCOUNTER
Patient called and stated that she has been taking laxatives since she had her colonoscopy was wanting advise.

## 2023-10-10 NOTE — TELEPHONE ENCOUNTER
Patient stated that she has use fiber she she had a bm 2 weeks ago and she stated that it was approx 12 inches long she has not had a bm til last night she stated she called her pcp and the pcp stated to try senna she did try that and she stated that It did work but her last bm was very painful. She stated that she only has had 2 bm since her colonoscopy she stated that she was using miralax and that was not effective she stated that she used that for a week though.

## 2023-10-17 ENCOUNTER — OFFICE VISIT (OUTPATIENT)
Dept: SURGERY | Facility: CLINIC | Age: 63
End: 2023-10-17
Payer: COMMERCIAL

## 2023-10-17 VITALS
HEIGHT: 64 IN | WEIGHT: 178 LBS | SYSTOLIC BLOOD PRESSURE: 151 MMHG | DIASTOLIC BLOOD PRESSURE: 90 MMHG | BODY MASS INDEX: 30.39 KG/M2

## 2023-10-17 DIAGNOSIS — K59.09 OTHER CONSTIPATION: Primary | ICD-10-CM

## 2023-10-17 RX ORDER — POLYETHYLENE GLYCOL 3350, SODIUM SULFATE ANHYDROUS, SODIUM BICARBONATE, SODIUM CHLORIDE, POTASSIUM CHLORIDE 236; 22.74; 6.74; 5.86; 2.97 G/4L; G/4L; G/4L; G/4L; G/4L
4 POWDER, FOR SOLUTION ORAL ONCE
Qty: 1 ML | Refills: 0 | Status: SHIPPED | OUTPATIENT
Start: 2023-10-17 | End: 2023-10-17

## 2023-10-17 RX ORDER — POLYETHYLENE GLYCOL 3350 17 G/17G
17 POWDER, FOR SOLUTION ORAL 2 TIMES DAILY
Qty: 28 PACKET | Refills: 0 | Status: SHIPPED | OUTPATIENT
Start: 2023-10-17 | End: 2023-10-17

## 2023-10-17 RX ORDER — PEG-3350, SODIUM SULFATE, SODIUM CHLORIDE, POTASSIUM CHLORIDE, SODIUM ASCORBATE AND ASCORBIC ACID 7.5-2.691G
1000 KIT ORAL EVERY 12 HOURS
Qty: 1 EACH | Refills: 0 | Status: SHIPPED | OUTPATIENT
Start: 2023-10-17

## 2023-10-17 NOTE — LETTER
October 20, 2023       No Recipients    Patient: Silvia Massey   YOB: 1960   Date of Visit: 10/17/2023     Dear Louie Jang Jr., MD:       Thank you for referring Silvia Massey to me for evaluation. Below are the relevant portions of my assessment and plan of care.    If you have questions, please do not hesitate to call me. I look forward to following Silvia along with you.         Sincerely,        Melecio Maldonado MD        CC:   No Recipients    Melecio Maldonado MD  10/20/23 2318  Sign when Signing Visit  General Surgery/Colorectal Surgery Note    Patient Name:  Silvia Massey  YOB: 1960  [id number]    Referring Provider: No ref. provider found      Patient Care Team:  Louie Jang Jr., MD as PCP - General (Internal Medicine)    Chief complaint follow-up    Subjective.     History of present illness:    Status post colonoscopy 9/6/2023 normal with next colonoscopy recommended in 5 years.    Constipation since her colonoscopy.  She has been started on some eyedrops which she cannot remember the name since her procedure.  She has tried senna.  No improvement with MiraLAX.      History:  Past Medical History:   Diagnosis Date   • Anemia    • Arthritis    • Arthritis of back    • CHF (congestive heart failure)    • CTS (carpal tunnel syndrome) 2012   • Depression    • Diabetes mellitus     type 2   • Dyspnea    • Forgetfulness    • GERD (gastroesophageal reflux disease)    • History of transfusion    • Hypertension    • Night sweats    • Nonischemic cardiomyopathy 05/16/2022   • Nonocclusive CAD 05/16/2022   • Periarthritis of shoulder 2010   • Pneumonia    • Sinus trouble    • Sleep apnea    • SOB (shortness of breath)    • Voice hoarseness        Past Surgical History:   Procedure Laterality Date   • BRONCHOSCOPY N/A 10/18/2018    Procedure: BRONCHOSCOPY WITH BIOPSIES, BAL;  Surgeon: Rayray Black MD;  Location: [facility] ENDOSCOPY;   Service: Pulmonary   • COLONOSCOPY  2018    [facility]    • COLONOSCOPY N/A 2023    Procedure: COLONOSCOPY;  Surgeon: Melecio Maldonado MD;  Location: [facility] ENDOSCOPY;  Service: General;  Laterality: N/A;  NORMAL COLONOSCOPY   • ENDOSCOPY  2017   • EYE SURGERY  2019    blepharoplasty   • LYMPHANGIOMA EXCISION     • OOPHORECTOMY     • SINUS SURGERY     • TUBAL ABDOMINAL LIGATION         Family History   Problem Relation Age of Onset   • Cancer Mother    • Heart disease Father    • Diabetes Brother    • Heart disease Brother    • Diabetes Other    • Diabetes Other    • Uterine cancer Other        Social History     Tobacco Use   • Smoking status: Former     Packs/day: 0.50     Years: 10.00     Additional pack years: 0.00     Total pack years: 5.00     Types: Cigarettes     Start date: 1977     Quit date: 10/11/1987     Years since quittin.0   • Smokeless tobacco: Former   • Tobacco comments:     25 years ago    Vaping Use   • Vaping Use: Never used   Substance Use Topics   • Alcohol use: Yes     Alcohol/week: 2.0 standard drinks of alcohol     Types: 2 Glasses of wine per week     Comment: occasionally drinks, 1 drink per day, has been drinking for 6-10 yrs,1 beer per night    • Drug use: No       Review of Systems  All systems were reviewed and negative except for:   Review of Systems   Constitutional: Negative for chills, fever and unexpected weight loss.   HENT: Negative for congestion, nosebleeds and voice change.    Eyes: Negative for blurred vision, double vision and discharge.   Respiratory: Negative for apnea, chest tightness and shortness of breath.    Cardiovascular: Negative for chest pain and leg swelling.   Gastrointestinal:        See HPI   Endocrine: Negative for cold intolerance and heat intolerance.   Genitourinary: Negative for dysuria, hematuria and urgency.   Musculoskeletal: Negative for back pain, joint swelling and neck pain.   Skin: Negative for color change and dry skin.    Neurological: Negative for dizziness and confusion.   Hematological: Negative for adenopathy.   Psychiatric/Behavioral: Negative for agitation and behavioral problems.     MEDS:  Prior to Admission medications    Medication Sig Start Date End Date Taking? Authorizing Provider   baclofen (LIORESAL) 10 MG tablet TAKE 1 TABLET BY MOUTH THREE TIMES DAILY 3/13/23  Yes Sumit Kerns MD   carvedilol (COREG) 6.25 MG tablet Take 1 tablet by mouth 2 (Two) Times a Day. 1/26/23  Yes Louie Jang Jr., MD   EQ Aspirin Adult Low Dose 81 MG EC tablet Take 1 tablet by mouth once daily 7/17/23  Yes Louie Jang Jr., MD   Farxiga 10 MG tablet TAKE 1 TABLET BY MOUTH ONCE DAILY IN THE MORNING 7/31/23  Yes Louie Jang Jr., MD   gabapentin (NEURONTIN) 600 MG tablet Take 1 tablet by mouth At Night As Needed (pain). 3/17/23  Yes Kay Rutledge APRN   linaclotide (Linzess) 72 MCG capsule capsule Take 1 capsule by mouth Every Morning Before Breakfast. 10/6/23  Yes Kay Rutledge APRN   meloxicam (MOBIC) 15 MG tablet Take 1 tablet by mouth Daily. 5/2/23  Yes Johnny Limon PA   prednisoLONE acetate (prednisoLONE Acetate P-F) 1 % ophthalmic suspension Administer 1 drop to both eyes Every 4 (Four) Hours. 10/9/22  Yes Liberty Bella APRN   rosuvastatin (CRESTOR) 10 MG tablet TAKE 1 TABLET BY MOUTH ONCE DAILY AT BEDTIME 4/3/23  Yes Louie Jang Jr., MD   sennosides-docusate (senna-docusate sodium) 8.6-50 MG per tablet Take 1 tablet by mouth 2 (Two) Times a Day As Needed for Constipation. 9/27/23  Yes Louie Jang Jr., MD   Tirzepatide 15 MG/0.5ML solution pen-injector Inject 15 mg under the skin into the appropriate area as directed 1 (One) Time Per Week. 9/11/23  Yes Louie Jang Jr., MD   traZODone (DESYREL) 50 MG tablet Take 1 tablet by mouth Every Night. 10/12/22  Yes Louie Jang Jr., MD   triamcinolone (KENALOG) 0.1 % ointment  "Apply 1 application topically to the appropriate area as directed 2 (Two) Times a Day. 11/18/22  Yes Louie Jang Jr., MD   valsartan (DIOVAN) 80 MG tablet Take 1 tablet by mouth Daily. 10/5/23  Yes Cherelle Zavala APRN   vitamin D (ERGOCALCIFEROL) 1.25 MG (92987 UT) capsule capsule Take 1 capsule by mouth once a week 9/5/23  Yes Louie Jang Jr., MD   PEG-KCl-NaCl-NaSulf-Na Asc-C (MoviPrep) 100 g reconstituted solution powder Take 1,000 mL by mouth Every 12 (Twelve) Hours. 10/17/23   Melecio Maldonado MD        Allergies:  Metformin    Objective    Vital Signs        Physical Exam:     General Appearance:    Alert, cooperative, in no acute distress   Head:    Normocephalic, without obvious abnormality, atraumatic   Eyes:          Conjunctivae and sclerae normal, no icterus,     Ears:    Ears appear intact with no abnormalities noted   Throat:   No oral lesions, no thrush, oral mucosa moist   Neck:   No adenopathy, supple, trachea midline, no thyromegaly   Back:     No kyphosis present, no scoliosis present, no skin lesions,      erythema or scars, no tenderness to percussion or                   palpation,   range of motion normal   Lungs:     Clear to auscultation,respirations regular, even and                  unlabored    Heart:    Regular rhythm and normal rate, normal S1 and S2, no            murmur, no gallop, no rub, no click   Chest Wall:    No abnormalities observed   Abdomen:     Normal bowel sounds, no masses, no organomegaly, soft        non-tender, non-distended, no guarding, no rebound                tenderness   Rectal:        Extremities:   Moves all extremities well, no edema, no cyanosis, no             redness   Pulses:   Pulses palpable and equal bilaterally   Skin:   No bleeding, bruising or rash   Lymph nodes:   No palpable adenopathy   Neurologic:   A/o x 4 with no deficits       Results Review:   {Results Review:64318::\"I reviewed the patient's new clinical " "results.\"    LABS/IMAGING:  Results for orders placed or performed in visit on 09/18/23   NASH Direct Reflex to 11 Biomarker    Specimen: Arm, Left; Blood   Result Value Ref Range    NASH Direct Negative Negative   Cyclic citrul peptide antibody, IgG/IgA    Specimen: Arm, Left; Blood   Result Value Ref Range    CCP Antibodies IgG/IgA 2 0 - 19 units   Rheumatoid Factor    Specimen: Arm, Left; Blood   Result Value Ref Range    Rheumatoid Factor Quantitative 18.0 (H) 0.0 - 14.0 IU/mL   Sedimentation rate    Specimen: Arm, Left; Blood   Result Value Ref Range    Sed Rate 8 0 - 30 mm/hr        Result Review:     Assessment & Plan    Status post colonoscopy 9/6/2023 normal with next colonoscopy recommended in 5 years.  Constipation     Discussion with patient.  She has follow-up with her eye doctor and I encouraged her to discuss if the medication was a possible cause of her constipation.  She was given a prescription for a bowel prep to help with her constipation.  Follow-up with me if no improvement.  All questions answered.  She agrees with the plan.  Thank you for the consult.        This document has been electronically signed by Melecio Maldonado MD  October 20, 2023 23:15 EDT  "

## 2023-10-21 NOTE — PROGRESS NOTES
General Surgery/Colorectal Surgery Note    Patient Name:  Silvia Massey  YOB: 1960  [id number]    Referring Provider: No ref. provider found      Patient Care Team:  Louie Jang Jr., MD as PCP - General (Internal Medicine)    Chief complaint follow-up    Subjective .     History of present illness:    Status post colonoscopy 9/6/2023 normal with next colonoscopy recommended in 5 years.    Constipation since her colonoscopy.  She has been started on some eyedrops which she cannot remember the name since her procedure.  She has tried senna.  No improvement with MiraLAX.      History:  Past Medical History:   Diagnosis Date    Anemia     Arthritis     Arthritis of back     CHF (congestive heart failure)     CTS (carpal tunnel syndrome) 2012    Depression     Diabetes mellitus     type 2    Dyspnea     Forgetfulness     GERD (gastroesophageal reflux disease)     History of transfusion     Hypertension     Night sweats     Nonischemic cardiomyopathy 05/16/2022    Nonocclusive CAD 05/16/2022    Periarthritis of shoulder 2010    Pneumonia     Sinus trouble     Sleep apnea     SOB (shortness of breath)     Voice hoarseness        Past Surgical History:   Procedure Laterality Date    BRONCHOSCOPY N/A 10/18/2018    Procedure: BRONCHOSCOPY WITH BIOPSIES, BAL;  Surgeon: Rayray Black MD;  Location: [facility] ENDOSCOPY;  Service: Pulmonary    COLONOSCOPY  2018    wnl     COLONOSCOPY N/A 9/6/2023    Procedure: COLONOSCOPY;  Surgeon: Melecio Maldonado MD;  Location: [facility] ENDOSCOPY;  Service: General;  Laterality: N/A;  NORMAL COLONOSCOPY    ENDOSCOPY  2017    EYE SURGERY  07/09/2019    blepharoplasty    LYMPHANGIOMA EXCISION      OOPHORECTOMY      SINUS SURGERY      TUBAL ABDOMINAL LIGATION         Family History   Problem Relation Age of Onset    Cancer Mother     Heart disease Father     Diabetes Brother     Heart disease Brother     Diabetes Other     Diabetes Other     Uterine cancer  Other        Social History     Tobacco Use    Smoking status: Former     Packs/day: 0.50     Years: 10.00     Additional pack years: 0.00     Total pack years: 5.00     Types: Cigarettes     Start date: 1977     Quit date: 10/11/1987     Years since quittin.0    Smokeless tobacco: Former    Tobacco comments:     25 years ago    Vaping Use    Vaping Use: Never used   Substance Use Topics    Alcohol use: Yes     Alcohol/week: 2.0 standard drinks of alcohol     Types: 2 Glasses of wine per week     Comment: occasionally drinks, 1 drink per day, has been drinking for 6-10 yrs,1 beer per night     Drug use: No       Review of Systems  All systems were reviewed and negative except for:   Review of Systems   Constitutional: Negative for chills, fever and unexpected weight loss.   HENT: Negative for congestion, nosebleeds and voice change.    Eyes: Negative for blurred vision, double vision and discharge.   Respiratory: Negative for apnea, chest tightness and shortness of breath.    Cardiovascular: Negative for chest pain and leg swelling.   Gastrointestinal:        See HPI   Endocrine: Negative for cold intolerance and heat intolerance.   Genitourinary: Negative for dysuria, hematuria and urgency.   Musculoskeletal: Negative for back pain, joint swelling and neck pain.   Skin: Negative for color change and dry skin.   Neurological: Negative for dizziness and confusion.   Hematological: Negative for adenopathy.   Psychiatric/Behavioral: Negative for agitation and behavioral problems.     MEDS:  Prior to Admission medications    Medication Sig Start Date End Date Taking? Authorizing Provider   baclofen (LIORESAL) 10 MG tablet TAKE 1 TABLET BY MOUTH THREE TIMES DAILY 3/13/23  Yes Sumit Kerns MD   carvedilol (COREG) 6.25 MG tablet Take 1 tablet by mouth 2 (Two) Times a Day. 23  Yes Louie Jang Jr., MD   EQ Aspirin Adult Low Dose 81 MG EC tablet Take 1 tablet by mouth once daily 23  Yes  Louie Jang Jr., MD   Farxiga 10 MG tablet TAKE 1 TABLET BY MOUTH ONCE DAILY IN THE MORNING 7/31/23  Yes Louie Jang Jr., MD   gabapentin (NEURONTIN) 600 MG tablet Take 1 tablet by mouth At Night As Needed (pain). 3/17/23  Yes Kay Rutledge APRN   linaclotide (Linzess) 72 MCG capsule capsule Take 1 capsule by mouth Every Morning Before Breakfast. 10/6/23  Yes Kay Rutlegde APRN   meloxicam (MOBIC) 15 MG tablet Take 1 tablet by mouth Daily. 5/2/23  Yes Johnny Limon PA   prednisoLONE acetate (prednisoLONE Acetate P-F) 1 % ophthalmic suspension Administer 1 drop to both eyes Every 4 (Four) Hours. 10/9/22  Yes Liberty Bella APRN   rosuvastatin (CRESTOR) 10 MG tablet TAKE 1 TABLET BY MOUTH ONCE DAILY AT BEDTIME 4/3/23  Yes Louie Jang Jr., MD   sennosides-docusate (senna-docusate sodium) 8.6-50 MG per tablet Take 1 tablet by mouth 2 (Two) Times a Day As Needed for Constipation. 9/27/23  Yes Louie Jang Jr., MD   Tirzepatide 15 MG/0.5ML solution pen-injector Inject 15 mg under the skin into the appropriate area as directed 1 (One) Time Per Week. 9/11/23  Yes Louie Jang Jr., MD   traZODone (DESYREL) 50 MG tablet Take 1 tablet by mouth Every Night. 10/12/22  Yes Louie Jang Jr., MD   triamcinolone (KENALOG) 0.1 % ointment Apply 1 application topically to the appropriate area as directed 2 (Two) Times a Day. 11/18/22  Yes Louie Jang Jr., MD   valsartan (DIOVAN) 80 MG tablet Take 1 tablet by mouth Daily. 10/5/23  Yes Cherelle Zavala APRN   vitamin D (ERGOCALCIFEROL) 1.25 MG (81252 UT) capsule capsule Take 1 capsule by mouth once a week 9/5/23  Yes Louie Jang Jr., MD   PEG-KCl-NaCl-NaSulf-Na Asc-C (MoviPrep) 100 g reconstituted solution powder Take 1,000 mL by mouth Every 12 (Twelve) Hours. 10/17/23   Melecio Maldonado MD        Allergies:  Metformin    Objective     Vital Signs     "    Physical Exam:     General Appearance:    Alert, cooperative, in no acute distress   Head:    Normocephalic, without obvious abnormality, atraumatic   Eyes:          Conjunctivae and sclerae normal, no icterus,     Ears:    Ears appear intact with no abnormalities noted   Throat:   No oral lesions, no thrush, oral mucosa moist   Neck:   No adenopathy, supple, trachea midline, no thyromegaly   Back:     No kyphosis present, no scoliosis present, no skin lesions,      erythema or scars, no tenderness to percussion or                   palpation,   range of motion normal   Lungs:     Clear to auscultation,respirations regular, even and                  unlabored    Heart:    Regular rhythm and normal rate, normal S1 and S2, no            murmur, no gallop, no rub, no click   Chest Wall:    No abnormalities observed   Abdomen:     Normal bowel sounds, no masses, no organomegaly, soft        non-tender, non-distended, no guarding, no rebound                tenderness   Rectal:        Extremities:   Moves all extremities well, no edema, no cyanosis, no             redness   Pulses:   Pulses palpable and equal bilaterally   Skin:   No bleeding, bruising or rash   Lymph nodes:   No palpable adenopathy   Neurologic:   A/o x 4 with no deficits       Results Review:   {Results Review:27260::\"I reviewed the patient's new clinical results.\"    LABS/IMAGING:  Results for orders placed or performed in visit on 09/18/23   NASH Direct Reflex to 11 Biomarker    Specimen: Arm, Left; Blood   Result Value Ref Range    NASH Direct Negative Negative   Cyclic citrul peptide antibody, IgG/IgA    Specimen: Arm, Left; Blood   Result Value Ref Range    CCP Antibodies IgG/IgA 2 0 - 19 units   Rheumatoid Factor    Specimen: Arm, Left; Blood   Result Value Ref Range    Rheumatoid Factor Quantitative 18.0 (H) 0.0 - 14.0 IU/mL   Sedimentation rate    Specimen: Arm, Left; Blood   Result Value Ref Range    Sed Rate 8 0 - 30 mm/hr        Result " Review :     Assessment & Plan     Status post colonoscopy 9/6/2023 normal with next colonoscopy recommended in 5 years.  Constipation     Discussion with patient.  She has follow-up with her eye doctor and I encouraged her to discuss if the medication was a possible cause of her constipation.  She was given a prescription for a bowel prep to help with her constipation.  Follow-up with me if no improvement.  All questions answered.  She agrees with the plan.  Thank you for the consult.        This document has been electronically signed by Melecio Maldonado MD  October 20, 2023 23:15 EDT

## 2023-10-27 RX ORDER — DAPAGLIFLOZIN 10 MG/1
TABLET, FILM COATED ORAL
Qty: 90 TABLET | Refills: 0 | Status: SHIPPED | OUTPATIENT
Start: 2023-10-27

## 2023-11-30 ENCOUNTER — OFFICE VISIT (OUTPATIENT)
Dept: INTERNAL MEDICINE | Facility: CLINIC | Age: 63
End: 2023-11-30
Payer: COMMERCIAL

## 2023-11-30 VITALS
WEIGHT: 178.4 LBS | TEMPERATURE: 96.8 F | HEIGHT: 64 IN | HEART RATE: 84 BPM | SYSTOLIC BLOOD PRESSURE: 132 MMHG | BODY MASS INDEX: 30.46 KG/M2 | DIASTOLIC BLOOD PRESSURE: 84 MMHG | OXYGEN SATURATION: 96 %

## 2023-11-30 DIAGNOSIS — E78.5 HYPERLIPIDEMIA LDL GOAL <70: ICD-10-CM

## 2023-11-30 DIAGNOSIS — E11.65 TYPE 2 DIABETES MELLITUS WITH HYPERGLYCEMIA, WITHOUT LONG-TERM CURRENT USE OF INSULIN: ICD-10-CM

## 2023-11-30 DIAGNOSIS — E55.9 VITAMIN D DEFICIENCY: ICD-10-CM

## 2023-11-30 DIAGNOSIS — I10 BENIGN ESSENTIAL HTN: ICD-10-CM

## 2023-11-30 DIAGNOSIS — Z23 NEED FOR PNEUMOCOCCAL VACCINATION: ICD-10-CM

## 2023-11-30 DIAGNOSIS — M79.89 SOFT TISSUE MASS: ICD-10-CM

## 2023-11-30 DIAGNOSIS — Z23 NEED FOR COVID-19 VACCINE: ICD-10-CM

## 2023-11-30 DIAGNOSIS — Z00.00 ANNUAL PHYSICAL EXAM: Primary | ICD-10-CM

## 2023-11-30 DIAGNOSIS — M77.9 TENDONITIS: ICD-10-CM

## 2023-11-30 LAB
25(OH)D3 SERPL-MCNC: 65.6 NG/ML (ref 30–100)
ALBUMIN SERPL-MCNC: 4.8 G/DL (ref 3.5–5.2)
ALBUMIN/GLOB SERPL: 1.8 G/DL
ALP SERPL-CCNC: 64 U/L (ref 39–117)
ALT SERPL W P-5'-P-CCNC: 22 U/L (ref 1–33)
ANION GAP SERPL CALCULATED.3IONS-SCNC: 12 MMOL/L (ref 5–15)
AST SERPL-CCNC: 22 U/L (ref 1–32)
BASOPHILS # BLD AUTO: 0.08 10*3/MM3 (ref 0–0.2)
BASOPHILS NFR BLD AUTO: 1.4 % (ref 0–1.5)
BILIRUB SERPL-MCNC: 0.4 MG/DL (ref 0–1.2)
BUN SERPL-MCNC: 20 MG/DL (ref 8–23)
BUN/CREAT SERPL: 25.6 (ref 7–25)
CALCIUM SPEC-SCNC: 10.1 MG/DL (ref 8.6–10.5)
CHLORIDE SERPL-SCNC: 104 MMOL/L (ref 98–107)
CHOLEST SERPL-MCNC: 123 MG/DL (ref 0–200)
CO2 SERPL-SCNC: 25 MMOL/L (ref 22–29)
CREAT SERPL-MCNC: 0.78 MG/DL (ref 0.57–1)
DEPRECATED RDW RBC AUTO: 39.8 FL (ref 37–54)
EGFRCR SERPLBLD CKD-EPI 2021: 85.5 ML/MIN/1.73
EOSINOPHIL # BLD AUTO: 0.13 10*3/MM3 (ref 0–0.4)
EOSINOPHIL NFR BLD AUTO: 2.3 % (ref 0.3–6.2)
ERYTHROCYTE [DISTWIDTH] IN BLOOD BY AUTOMATED COUNT: 13.1 % (ref 12.3–15.4)
GLOBULIN UR ELPH-MCNC: 2.6 GM/DL
GLUCOSE SERPL-MCNC: 120 MG/DL (ref 65–99)
HBA1C MFR BLD: 6.6 % (ref 4.8–5.6)
HCT VFR BLD AUTO: 41.8 % (ref 34–46.6)
HDLC SERPL-MCNC: 69 MG/DL (ref 40–60)
HGB BLD-MCNC: 13.8 G/DL (ref 12–15.9)
IMM GRANULOCYTES # BLD AUTO: 0.01 10*3/MM3 (ref 0–0.05)
IMM GRANULOCYTES NFR BLD AUTO: 0.2 % (ref 0–0.5)
LDLC SERPL CALC-MCNC: 39 MG/DL (ref 0–100)
LDLC/HDLC SERPL: 0.57 {RATIO}
LYMPHOCYTES # BLD AUTO: 2.1 10*3/MM3 (ref 0.7–3.1)
LYMPHOCYTES NFR BLD AUTO: 36.8 % (ref 19.6–45.3)
MCH RBC QN AUTO: 27.8 PG (ref 26.6–33)
MCHC RBC AUTO-ENTMCNC: 33 G/DL (ref 31.5–35.7)
MCV RBC AUTO: 84.1 FL (ref 79–97)
MONOCYTES # BLD AUTO: 0.36 10*3/MM3 (ref 0.1–0.9)
MONOCYTES NFR BLD AUTO: 6.3 % (ref 5–12)
NEUTROPHILS NFR BLD AUTO: 3.02 10*3/MM3 (ref 1.7–7)
NEUTROPHILS NFR BLD AUTO: 53 % (ref 42.7–76)
NRBC BLD AUTO-RTO: 0 /100 WBC (ref 0–0.2)
PLATELET # BLD AUTO: 261 10*3/MM3 (ref 140–450)
PMV BLD AUTO: 11.5 FL (ref 6–12)
POTASSIUM SERPL-SCNC: 4.4 MMOL/L (ref 3.5–5.2)
PROT SERPL-MCNC: 7.4 G/DL (ref 6–8.5)
RBC # BLD AUTO: 4.97 10*6/MM3 (ref 3.77–5.28)
SODIUM SERPL-SCNC: 141 MMOL/L (ref 136–145)
TRIGL SERPL-MCNC: 72 MG/DL (ref 0–150)
VLDLC SERPL-MCNC: 15 MG/DL (ref 5–40)
WBC NRBC COR # BLD AUTO: 5.7 10*3/MM3 (ref 3.4–10.8)

## 2023-11-30 PROCEDURE — 80061 LIPID PANEL: CPT | Performed by: INTERNAL MEDICINE

## 2023-11-30 PROCEDURE — 80053 COMPREHEN METABOLIC PANEL: CPT | Performed by: INTERNAL MEDICINE

## 2023-11-30 PROCEDURE — 83036 HEMOGLOBIN GLYCOSYLATED A1C: CPT | Performed by: INTERNAL MEDICINE

## 2023-11-30 PROCEDURE — 85025 COMPLETE CBC W/AUTO DIFF WBC: CPT | Performed by: INTERNAL MEDICINE

## 2023-11-30 PROCEDURE — 82306 VITAMIN D 25 HYDROXY: CPT | Performed by: INTERNAL MEDICINE

## 2023-11-30 RX ORDER — TIRZEPATIDE 2.5 MG/.5ML
2.5 INJECTION, SOLUTION SUBCUTANEOUS WEEKLY
Qty: 2 ML | Refills: 0 | Status: SHIPPED | OUTPATIENT
Start: 2023-11-30

## 2023-11-30 RX ORDER — DULAGLUTIDE 4.5 MG/.5ML
INJECTION, SOLUTION SUBCUTANEOUS
COMMUNITY
Start: 2023-11-13

## 2023-11-30 NOTE — PROGRESS NOTES
Chief Complaint  Annual exam, Toe Pain (SWOLLEN- LEFT FOOT SECOND TOE /) and Hypertension    Subjective          Silvia Massey presents to [facility]  History of Present Illness  Hypertension- patient denies headaches, dizziness, chest pain.   DM2- patient has lost weight. Patient could not get mounjaro improved.   Hyperlipidemia- doing well on statin and aspirin.   Patient reports her left 2nd toe is painful and swollen for about 4 days. It is improving at this time.  Patient with ongoing soft tissue swelling on neck. Patient previously imaged and thought to be seroma. Patient would like to have drained.       Current Outpatient Medications   Medication Instructions    baclofen (LIORESAL) 10 MG tablet TAKE 1 TABLET BY MOUTH THREE TIMES DAILY    carvedilol (COREG) 6.25 mg, Oral, 2 Times Daily    Diclofenac Sodium (VOLTAREN) 4 g, Topical, 4 Times Daily PRN    EQ Aspirin Adult Low Dose 81 MG EC tablet Take 1 tablet by mouth once daily    Farxiga 10 MG tablet TAKE 1 TABLET BY MOUTH ONCE DAILY IN THE MORNING    gabapentin (NEURONTIN) 600 mg, Oral, Nightly PRN    meloxicam (MOBIC) 15 mg, Oral, Daily    Mounjaro 2.5 mg, Subcutaneous, Weekly    rosuvastatin (CRESTOR) 10 MG tablet TAKE 1 TABLET BY MOUTH ONCE DAILY AT BEDTIME    sennosides-docusate (senna-docusate sodium) 8.6-50 MG per tablet 1 tablet, Oral, 2 Times Daily PRN    traZODone (DESYREL) 50 mg, Oral, Nightly    triamcinolone (KENALOG) 0.1 % ointment 1 application , Topical, 2 Times Daily    Trulicity 4.5 MG/0.5ML solution pen-injector INJECT 1/2 (ONE-HALF) ML SUBCUTANEOUSLY ONCE A WEEK    valsartan (DIOVAN) 80 mg, Oral, Daily    vitamin D (ERGOCALCIFEROL) 50,000 Units, Oral, Weekly       The following portions of the patient's history were reviewed and updated as appropriate: allergies, current medications, past family history, past medical history, past social history, past surgical history, and problem  "list.    Objective   Vital Signs:   /84 (BP Location: Left arm)   Pulse 84   Temp 96.8 °F (36 °C) (Temporal)   Ht 162.6 cm (64\")   Wt 80.9 kg (178 lb 6.4 oz)   SpO2 96%   BMI 30.62 kg/m²     BP Readings from Last 3 Encounters:   11/30/23 132/84   10/17/23 151/90   09/06/23 127/74     Wt Readings from Last 3 Encounters:   11/30/23 80.9 kg (178 lb 6.4 oz)   10/17/23 80.7 kg (178 lb)   09/06/23 82.6 kg (182 lb 1.6 oz)         Physical Exam   Appearance: No acute distress, well-nourished  Head: normocephalic, atraumatic  Eyes: extraocular movements intact, no scleral icterus, no conjunctival injection  Ears, Nose, and Throat: external ears normal, nares patent, moist mucous membranes  Cardiovascular: regular rate and rhythm. no murmurs, rubs, or gallops. no edema  Respiratory: breathing comfortably, symmetric chest rise, clear to auscultation bilaterally. No wheezes, rales, or rhonchi.  Neuro: alert and oriented to time, place, and person. Normal gait  Psych: normal mood and affect   Foot: mild erythema of left 2nd toe. Mild TTP. Perfusion good.       Result Review :   The following data was reviewed by: Louie Jang Jr, MD on 11/30/2023:  Common labs          5/25/2023    09:16 9/7/2023    12:15   Common Labs   Glucose 110  134    BUN 21  21    Creatinine 0.83  0.80    Sodium 142  143    Potassium 4.6  3.9    Chloride 104  109    Calcium 10.7  10.6    Albumin 5.0     Total Bilirubin 0.2     Alkaline Phosphatase 56     AST (SGOT) 17     ALT (SGPT) 15     WBC 5.83     Hemoglobin 13.5     Hematocrit 41.5     Platelets 236     Total Cholesterol 109     Triglycerides 68     HDL Cholesterol 62     LDL Cholesterol  33     Hemoglobin A1C 6.50     Microalbumin, Urine 1.4         Lab Results   Component Value Date    INR 0.99 (L) 05/11/2021          Assessment and Plan    Diagnoses and all orders for this visit:    1. Annual physical exam (Primary)    2. Hyperlipidemia LDL goal <70  Comments:  check labs. " cont statin.  Orders:  -     Lipid Panel    3. Benign essential HTN  Comments:  well controlled.  Orders:  -     CBC & Differential  -     Comprehensive Metabolic Panel    4. Vitamin D deficiency  -     Vitamin D,25-Hydroxy    5. Type 2 diabetes mellitus with hyperglycemia, without long-term current use of insulin  Comments:  switch from trulicity to mounajro to augment weight loss efforts.  Orders:  -     Hemoglobin A1c  -     Tirzepatide (Mounjaro) 2.5 MG/0.5ML solution pen-injector; Inject 0.5 mL under the skin into the appropriate area as directed 1 (One) Time Per Week.  Dispense: 2 mL; Refill: 0    6. Need for COVID-19 vaccine  Comments:  pt defers today    7. Need for pneumococcal vaccination  -     Pneumococcal Conjugate Vaccine 20-Valent All    8. Soft tissue mass  Comments:  previously imaged and thought to be seroma. will refer to surgery for drainage.  Orders:  -     Ambulatory Referral to General Surgery    9. Tendonitis  Comments:  apply voltaren gel. call or RTC if no better.  Orders:  -     Diclofenac Sodium (VOLTAREN) 1 % gel gel; Apply 4 g topically to the appropriate area as directed 4 (Four) Times a Day As Needed (pain).  Dispense: 350 g; Refill: 1      Advised on diet, physical activity, etc    Medications Discontinued During This Encounter   Medication Reason    Tirzepatide 15 MG/0.5ML solution pen-injector Cost of medication    prednisoLONE acetate (prednisoLONE Acetate P-F) 1 % ophthalmic suspension *Therapy completed    linaclotide (Linzess) 72 MCG capsule capsule *Therapy completed    PEG-KCl-NaCl-NaSulf-Na Asc-C (MoviPrep) 100 g reconstituted solution powder *Therapy completed          Follow Up   Return in about 6 months (around 5/30/2024) for Recheck, DM.  Patient was given instructions and counseling regarding her condition or for health maintenance advice. Please see specific information pulled into the AVS if appropriate.       Louie Jang Jr, MD  11/30/23  10:23  EST

## 2023-11-30 NOTE — LETTER
[facility]  Vaccine Consent Form    Patient Name:  Silvia Massey  Patient :  1960     Vaccine(s) Ordered    Pneumococcal Conjugate Vaccine 20-Valent All        Screening Checklist  The following questions should be completed prior to vaccination. If you answer “yes” to any question, it does not necessarily mean you should not be vaccinated. It just means we may need to clarify or ask more questions. If a question is unclear, please ask your healthcare provider to explain it.    Yes No   Any fever or moderate to severe illness today (mild illness and/or antibiotic treatment are not contraindications)?     Do you have a history of a serious reaction to any previous vaccinations, such as anaphylaxis, encephalopathy within 7 days, Guillain-Midway syndrome within 6 weeks, seizure?     Have you received any live vaccine(s) (e.g MMR, DANIEL) or any other vaccines in the last month (to ensure duplicate doses aren't given)?     Do you have an anaphylactic allergy to latex (DTaP, DTaP-IPV, Hep A, Hep B, MenB, RV, Td, Tdap), baker’s yeast (Hep B, HPV), polysorbates (RSV, nirsevimab, PCV 20, Rotavirrus, Tdap, Shingrix), or gelatin (DANIEL, MMR)?     Do you have an anaphylactic allergy to neomycin (Rabies, DANIEL, MMR, IPV, Hep A), polymyxin B (IPV), or streptomycin (IPV)?      Any cancer, leukemia, AIDS, or other immune system disorder? (DANIEL, MMR, RV)     Do you have a parent, brother, or sister with an immune system problem (if immune competence of vaccine recipient clinically verified, can proceed)? (MMR, DANIEL)     Any recent steroid treatments for >2 weeks, chemotherapy, or radiation treatment? (DANIEL, MMR)     Have you received antibody-containing blood transfusions or IVIG in the past 11 months (recommended interval is dependent on product)? (MMR, DANIEL)     Have you taken antiviral drugs (acyclovir, famciclovir, valacyclovir for DANIEL) in the last 24 or 48 hours, respectively?      Are you pregnant or planning to become  pregnant within 1 month? (DANIEL, MMR, HPV, IPV, MenB, Abrexvy; For Hep B- refer to Engerix-B; For RSV - Abrysvo is indicated for 32-36 weeks of pregnancy from September to January)     For infants, have you ever been told your child has had intussusception or a medical emergency involving obstruction of the intestine (Rotavirus)? If not for an infant, can skip this question.         *Ordering Physician/APC should be consulted if “yes” is checked by the patient or guardian above.      I have received, read, and understand the Vaccine Information Statement (VIS) for each vaccine ordered above.  I have considered my health status as well as the health status of my close contacts.  I have taken the opportunity to discuss my vaccine questions with my health care provider.   I have requested that the ordered vaccine(s) be given to me.  I understand the benefits and risks of the vaccines.  I understand that I should remain in the clinic for 15 minutes after receiving the vaccine(s).  _________________________________________________________  Signature of Patient or Parent/Legal Guardian ____________________  Date

## 2023-12-07 ENCOUNTER — PREP FOR SURGERY (OUTPATIENT)
Dept: OTHER | Facility: HOSPITAL | Age: 63
End: 2023-12-07
Payer: COMMERCIAL

## 2023-12-07 ENCOUNTER — OFFICE VISIT (OUTPATIENT)
Dept: SURGERY | Facility: CLINIC | Age: 63
End: 2023-12-07
Payer: COMMERCIAL

## 2023-12-07 VITALS
BODY MASS INDEX: 30.56 KG/M2 | HEIGHT: 64 IN | DIASTOLIC BLOOD PRESSURE: 74 MMHG | WEIGHT: 179 LBS | SYSTOLIC BLOOD PRESSURE: 145 MMHG

## 2023-12-07 DIAGNOSIS — D17.0 LIPOMA OF NECK: Primary | ICD-10-CM

## 2023-12-07 DIAGNOSIS — R22.1 NECK MASS: Primary | ICD-10-CM

## 2023-12-07 PROCEDURE — 99203 OFFICE O/P NEW LOW 30 MIN: CPT | Performed by: SURGERY

## 2023-12-07 RX ORDER — SODIUM CHLORIDE, SODIUM LACTATE, POTASSIUM CHLORIDE, CALCIUM CHLORIDE 600; 310; 30; 20 MG/100ML; MG/100ML; MG/100ML; MG/100ML
50 INJECTION, SOLUTION INTRAVENOUS CONTINUOUS
OUTPATIENT
Start: 2023-12-07

## 2023-12-07 RX ORDER — SODIUM CHLORIDE 9 MG/ML
40 INJECTION, SOLUTION INTRAVENOUS AS NEEDED
OUTPATIENT
Start: 2023-12-07

## 2023-12-07 RX ORDER — SODIUM CHLORIDE 0.9 % (FLUSH) 0.9 %
10 SYRINGE (ML) INJECTION AS NEEDED
OUTPATIENT
Start: 2023-12-07

## 2023-12-07 RX ORDER — CEFAZOLIN SODIUM 2 G/100ML
2000 INJECTION, SOLUTION INTRAVENOUS ONCE
OUTPATIENT
Start: 2023-12-07 | End: 2023-12-07

## 2023-12-07 RX ORDER — SODIUM CHLORIDE 0.9 % (FLUSH) 0.9 %
10 SYRINGE (ML) INJECTION EVERY 12 HOURS SCHEDULED
OUTPATIENT
Start: 2023-12-07

## 2023-12-12 NOTE — PROGRESS NOTES
General Surgery/Colorectal Surgery Note    Patient Name:  Silvia Massey  YOB: 1960  [id number]    Referring Provider: Louie Jang*      Patient Care Team:  Louie Jang Jr., MD as PCP - General (Internal Medicine)    Chief complaint neck mass    Subjective .     History of present illness:    10-year history of a mass to her posterior neck that is increased in size over the past month.  No history of the same.  No personal family history of soft tissue cancer.  No infectious symptoms.  No history of trauma.  No blood thinner use.  No tobacco use.  No chest pain.  No imaging.  History of Trulicity use.      History:  Past Medical History:   Diagnosis Date    Anemia     Arthritis     Arthritis of back     CHF (congestive heart failure)     CTS (carpal tunnel syndrome) 2012    Depression     Diabetes mellitus     type 2    Dyspnea     Forgetfulness     GERD (gastroesophageal reflux disease)     History of transfusion     Hypertension     Night sweats     Nonischemic cardiomyopathy 05/16/2022    Nonocclusive CAD 05/16/2022    Periarthritis of shoulder 2010    Pneumonia     Sinus trouble     Sleep apnea     SOB (shortness of breath)     Voice hoarseness        Past Surgical History:   Procedure Laterality Date    BRONCHOSCOPY N/A 10/18/2018    Procedure: BRONCHOSCOPY WITH BIOPSIES, BAL;  Surgeon: Rayray Black MD;  Location: [facility] ENDOSCOPY;  Service: Pulmonary    COLONOSCOPY  2018    wnl     COLONOSCOPY N/A 9/6/2023    Procedure: COLONOSCOPY;  Surgeon: Melecio Maldonado MD;  Location: [facility] END[facility];  Service: General;  Laterality: N/A;  NORMAL COLONOSCOPY    ENDOSCOPY  2017    EYE SURGERY  07/09/2019    blepharoplasty    LYMPHANGIOMA EXCISION      OOPHORECTOMY      SINUS SURGERY      TUBAL ABDOMINAL LIGATION         Family History   Problem Relation Age of Onset    Cancer Mother     Heart disease Father     Diabetes Brother     Heart disease Brother      Diabetes Other     Diabetes Other     Uterine cancer Other        Social History     Tobacco Use    Smoking status: Former     Packs/day: 0.50     Years: 10.00     Additional pack years: 0.00     Total pack years: 5.00     Types: Cigarettes     Start date: 1977     Quit date: 10/11/1987     Years since quittin.1    Smokeless tobacco: Former    Tobacco comments:     25 years ago    Vaping Use    Vaping Use: Never used   Substance Use Topics    Alcohol use: Yes     Alcohol/week: 2.0 standard drinks of alcohol     Types: 2 Glasses of wine per week     Comment: occasionally drinks, 1 drink per day, has been drinking for 6-10 yrs,1 beer per night     Drug use: No       Review of Systems  All systems were reviewed and negative except for:   Review of Systems   Constitutional: Negative for chills, fever and unexpected weight loss.   HENT: Negative for congestion, nosebleeds and voice change.    Eyes: Negative for blurred vision, double vision and discharge.   Respiratory: Negative for apnea, chest tightness and shortness of breath.    Cardiovascular: Negative for chest pain and leg swelling.   Gastrointestinal: Negative nausea abdominal area abdominal pain   Endocrine: Negative for cold intolerance and heat intolerance.   Genitourinary: Negative for dysuria, hematuria and urgency.   Musculoskeletal: Negative for back pain, joint swelling and neck pain.   Skin: Negative for color change and dry skin.  See HPI  Neurological: Negative for dizziness and confusion.   Hematological: Negative for adenopathy.   Psychiatric/Behavioral: Negative for agitation and behavioral problems.     MEDS:  Prior to Admission medications    Medication Sig Start Date End Date Taking? Authorizing Provider   baclofen (LIORESAL) 10 MG tablet TAKE 1 TABLET BY MOUTH THREE TIMES DAILY 3/13/23  Yes Sumit Kerns MD   carvedilol (COREG) 6.25 MG tablet Take 1 tablet by mouth 2 (Two) Times a Day. 23  Yes Louie Jang Jr., MD    Diclofenac Sodium (VOLTAREN) 1 % gel gel Apply 4 g topically to the appropriate area as directed 4 (Four) Times a Day As Needed (pain). 11/30/23  Yes Louie Jang Jr., MD   EQ Aspirin Adult Low Dose 81 MG EC tablet Take 1 tablet by mouth once daily 7/17/23  Yes Louie Jang Jr., MD   Farxiga 10 MG tablet TAKE 1 TABLET BY MOUTH ONCE DAILY IN THE MORNING 10/27/23  Yes Louie Jang Jr., MD   gabapentin (NEURONTIN) 600 MG tablet Take 1 tablet by mouth At Night As Needed (pain). 3/17/23  Yes Kay Rutledge APRN   meloxicam (MOBIC) 15 MG tablet Take 1 tablet by mouth Daily. 5/2/23  Yes Johnny Limon PA   rosuvastatin (CRESTOR) 10 MG tablet TAKE 1 TABLET BY MOUTH ONCE DAILY AT BEDTIME 4/3/23  Yes Louie Jang Jr., MD   sennosides-docusate (senna-docusate sodium) 8.6-50 MG per tablet Take 1 tablet by mouth 2 (Two) Times a Day As Needed for Constipation. 9/27/23  Yes Louie Jang Jr., MD   Tirzepatide (Mounjaro) 2.5 MG/0.5ML solution pen-injector Inject 0.5 mL under the skin into the appropriate area as directed 1 (One) Time Per Week. 11/30/23  Yes Louie Jang Jr., MD   traZODone (DESYREL) 50 MG tablet Take 1 tablet by mouth Every Night. 10/12/22  Yes Louie Jang Jr., MD   triamcinolone (KENALOG) 0.1 % ointment Apply 1 application topically to the appropriate area as directed 2 (Two) Times a Day. 11/18/22  Yes Louie Jang Jr., MD   Trulicity 4.5 MG/0.5ML solution pen-injector INJECT 1/2 (ONE-HALF) ML SUBCUTANEOUSLY ONCE A WEEK 11/13/23  Yes Britt Zelaya MD   valsartan (DIOVAN) 80 MG tablet Take 1 tablet by mouth Daily. 10/5/23  Yes Lucas, Cherelle Rhonda, APRN   vitamin D (ERGOCALCIFEROL) 1.25 MG (60983 UT) capsule capsule Take 1 capsule by mouth once a week 9/5/23  Yes Louie Jang Jr., MD        Allergies:  Metformin    Objective     Vital Signs        Physical Exam:     General Appearance:     "Alert, cooperative, in no acute distress   Head:    Normocephalic, without obvious abnormality, atraumatic   Eyes:          Conjunctivae and sclerae normal, no icterus,     Ears:    Ears appear intact with no abnormalities noted   Throat:   No oral lesions, no thrush, oral mucosa moist   Neck:   No adenopathy, supple, trachea midline, no thyromegaly   Back:     No kyphosis present, no scoliosis present, no skin lesions,      erythema or scars, no tenderness to percussion or                   palpation,   range of motion normal   Lungs:     Clear to auscultation,respirations regular, even and                  unlabored    Heart:    Regular rhythm and normal rate, normal S1 and S2, no            murmur, no gallop, no rub, no click   Chest Wall:    No abnormalities observed   Abdomen:     Normal bowel sounds, no masses, no organomegaly, soft        non-tender, non-distended, no guarding, no rebound                tenderness   Rectal:        Extremities:   Moves all extremities well, no edema, no cyanosis, no             redness   Pulses:   Pulses palpable and equal bilaterally   Skin:   No bleeding, bruising or rash, firmness to the posterior neck likely lipoma   Lymph nodes:   No palpable adenopathy   Neurologic:   A/o x 4 with no deficits       Results Review:   {Results Review:[id number]::\"I reviewed the patient's new clinical results.\"    LABS/IMAGING:  Results for orders placed or performed in visit on 11/30/23   Comprehensive Metabolic Panel    Specimen: Blood   Result Value Ref Range    Glucose 120 (H) 65 - 99 mg/dL    BUN 20 8 - 23 mg/dL    Creatinine 0.78 0.57 - 1.00 mg/dL    Sodium 141 136 - 145 mmol/L    Potassium 4.4 3.5 - 5.2 mmol/L    Chloride 104 98 - 107 mmol/L    CO2 25.0 22.0 - 29.0 mmol/L    Calcium 10.1 8.6 - 10.5 mg/dL    Total Protein 7.4 6.0 - 8.5 g/dL    Albumin 4.8 3.5 - 5.2 g/dL    ALT (SGPT) 22 1 - 33 U/L    AST (SGOT) 22 1 - 32 U/L    Alkaline Phosphatase 64 39 - 117 U/L    Total Bilirubin 0.4 " 0.0 - 1.2 mg/dL    Globulin 2.6 gm/dL    A/G Ratio 1.8 g/dL    BUN/Creatinine Ratio 25.6 (H) 7.0 - 25.0    Anion Gap 12.0 5.0 - 15.0 mmol/L    eGFR 85.5 >60.0 mL/min/1.73   Hemoglobin A1c    Specimen: Blood   Result Value Ref Range    Hemoglobin A1C 6.60 (H) 4.80 - 5.60 %   Lipid Panel    Specimen: Blood   Result Value Ref Range    Total Cholesterol 123 0 - 200 mg/dL    Triglycerides 72 0 - 150 mg/dL    HDL Cholesterol 69 (H) 40 - 60 mg/dL    LDL Cholesterol  39 0 - 100 mg/dL    VLDL Cholesterol 15 5 - 40 mg/dL    LDL/HDL Ratio 0.57    Vitamin D,25-Hydroxy    Specimen: Blood   Result Value Ref Range    25 Hydroxy, Vitamin D 65.6 30.0 - 100.0 ng/ml   CBC Auto Differential    Specimen: Blood   Result Value Ref Range    WBC 5.70 3.40 - 10.80 10*3/mm3    RBC 4.97 3.77 - 5.28 10*6/mm3    Hemoglobin 13.8 12.0 - 15.9 g/dL    Hematocrit 41.8 34.0 - 46.6 %    MCV 84.1 79.0 - 97.0 fL    MCH 27.8 26.6 - 33.0 pg    MCHC 33.0 31.5 - 35.7 g/dL    RDW 13.1 12.3 - 15.4 %    RDW-SD 39.8 37.0 - 54.0 fl    MPV 11.5 6.0 - 12.0 fL    Platelets 261 140 - 450 10*3/mm3    Neutrophil % 53.0 42.7 - 76.0 %    Lymphocyte % 36.8 19.6 - 45.3 %    Monocyte % 6.3 5.0 - 12.0 %    Eosinophil % 2.3 0.3 - 6.2 %    Basophil % 1.4 0.0 - 1.5 %    Immature Grans % 0.2 0.0 - 0.5 %    Neutrophils, Absolute 3.02 1.70 - 7.00 10*3/mm3    Lymphocytes, Absolute 2.10 0.70 - 3.10 10*3/mm3    Monocytes, Absolute 0.36 0.10 - 0.90 10*3/mm3    Eosinophils, Absolute 0.13 0.00 - 0.40 10*3/mm3    Basophils, Absolute 0.08 0.00 - 0.20 10*3/mm3    Immature Grans, Absolute 0.01 0.00 - 0.05 10*3/mm3    nRBC 0.0 0.0 - 0.2 /100 WBC        Result Review :     Assessment & Plan     Posterior neck mass likely lipoma    Discussion with the patient.  I recommended obtaining ultrasound prior to excision in the OR for further evaluation.  I explained there is a possibility of further imaging based on the ultrasound results.  After the ultrasound I recommended excision in the OR.  I  explained the procedure and recovery.  Benefits and alternatives discussed.  Risk procedure including risk of anesthesia, bleeding, infection, pain, recurrence, seroma formation, heart attack, stroke, blood clot, pneumonia discussed.  All questions answered.  She agrees with the plan.  Orders placed.  She was instructed to use chlorhexidine the night before surgery.  Thank for the consult.           This document has been electronically signed by Melecio Maldonado MD  December 12, 2023 16:12 EST

## 2023-12-21 ENCOUNTER — HOSPITAL ENCOUNTER (OUTPATIENT)
Dept: ULTRASOUND IMAGING | Facility: HOSPITAL | Age: 63
Discharge: HOME | End: 2023-12-21
Admitting: SURGERY
Payer: COMMERCIAL

## 2023-12-21 DIAGNOSIS — R22.1 NECK MASS: ICD-10-CM

## 2023-12-21 PROCEDURE — 76999 ECHO EXAMINATION PROCEDURE: CPT

## 2023-12-28 ENCOUNTER — OFFICE VISIT (OUTPATIENT)
Dept: INTERNAL MEDICINE | Facility: CLINIC | Age: 63
End: 2023-12-28
Payer: COMMERCIAL

## 2023-12-28 ENCOUNTER — HOSPITAL ENCOUNTER (OUTPATIENT)
Dept: GENERAL RADIOLOGY | Facility: HOSPITAL | Age: 63
Discharge: HOME | End: 2023-12-28
Payer: COMMERCIAL

## 2023-12-28 VITALS
HEIGHT: 64 IN | DIASTOLIC BLOOD PRESSURE: 78 MMHG | WEIGHT: 187.2 LBS | HEART RATE: 75 BPM | OXYGEN SATURATION: 94 % | BODY MASS INDEX: 31.96 KG/M2 | TEMPERATURE: 96.3 F | SYSTOLIC BLOOD PRESSURE: 127 MMHG

## 2023-12-28 DIAGNOSIS — M54.32 SCIATICA OF LEFT SIDE: ICD-10-CM

## 2023-12-28 DIAGNOSIS — M25.562 CHRONIC PAIN OF LEFT KNEE: ICD-10-CM

## 2023-12-28 DIAGNOSIS — E11.65 TYPE 2 DIABETES MELLITUS WITH HYPERGLYCEMIA, WITHOUT LONG-TERM CURRENT USE OF INSULIN: ICD-10-CM

## 2023-12-28 DIAGNOSIS — G89.29 CHRONIC PAIN OF LEFT KNEE: ICD-10-CM

## 2023-12-28 DIAGNOSIS — M54.32 SCIATICA OF LEFT SIDE: Primary | ICD-10-CM

## 2023-12-28 PROCEDURE — 99214 OFFICE O/P EST MOD 30 MIN: CPT | Performed by: INTERNAL MEDICINE

## 2023-12-28 PROCEDURE — 73562 X-RAY EXAM OF KNEE 3: CPT

## 2023-12-28 PROCEDURE — 72110 X-RAY EXAM L-2 SPINE 4/>VWS: CPT

## 2023-12-28 RX ORDER — GABAPENTIN 600 MG/1
600 TABLET ORAL NIGHTLY PRN
Qty: 30 TABLET | Refills: 0 | Status: SHIPPED | OUTPATIENT
Start: 2023-12-28

## 2023-12-28 RX ORDER — GABAPENTIN 300 MG/1
300 CAPSULE ORAL 2 TIMES DAILY PRN
Qty: 60 CAPSULE | Refills: 0 | Status: SHIPPED | OUTPATIENT
Start: 2023-12-28

## 2023-12-28 NOTE — PROGRESS NOTES
Chief Complaint  sciatic nerve (3 week /Acting up so bad she can't even sleep /Move from her back to her left  knee)    Subjective          Silvia Massey presents to [facility]  History of Present Illness  Patient reports having low back pain and left knee pain. She reports sciatic nerve pain radiates down her leg. Patient reports the pain in her knee will wake her from sleep. Patient without falls or trauma. Patient reports paresthesias in her feet as well. Patient without swelling or erythematous changes in joints. Patient has been taking baclofen, tylenol, lidocaine patch, and gabapentin. Patient reports gabapentin 600mg can make her sleepy, but does not help the pain a lot.       Current Outpatient Medications   Medication Instructions    baclofen (LIORESAL) 10 MG tablet TAKE 1 TABLET BY MOUTH THREE TIMES DAILY    carvedilol (COREG) 6.25 mg, Oral, 2 Times Daily    Diclofenac Sodium (VOLTAREN) 4 g, Topical, 4 Times Daily PRN    EQ Aspirin Adult Low Dose 81 MG EC tablet Take 1 tablet by mouth once daily    Farxiga 10 MG tablet TAKE 1 TABLET BY MOUTH ONCE DAILY IN THE MORNING    gabapentin (NEURONTIN) 600 mg, Oral, Nightly PRN    gabapentin (NEURONTIN) 300 mg, Oral, 2 Times Daily PRN    meloxicam (MOBIC) 15 mg, Oral, Daily    rosuvastatin (CRESTOR) 10 MG tablet TAKE 1 TABLET BY MOUTH ONCE DAILY AT BEDTIME    sennosides-docusate (senna-docusate sodium) 8.6-50 MG per tablet 1 tablet, Oral, 2 Times Daily PRN    Tirzepatide (MOUNJARO) 5 mg, Subcutaneous, Weekly    traZODone (DESYREL) 50 mg, Oral, Nightly    triamcinolone (KENALOG) 0.1 % ointment 1 application , Topical, 2 Times Daily    valsartan (DIOVAN) 80 mg, Oral, Daily    vitamin D (ERGOCALCIFEROL) 50,000 Units, Oral, Weekly       The following portions of the patient's history were reviewed and updated as appropriate: allergies, current medications, past family history, past medical history, past social  "history, past surgical history, and problem list.    Objective   Vital Signs:   /78 (BP Location: Left arm)   Pulse 75   Temp 96.3 °F (35.7 °C) (Temporal)   Ht 162.6 cm (64\")   Wt 84.9 kg (187 lb 3.2 oz)   SpO2 94%   BMI 32.13 kg/m²     BP Readings from Last 3 Encounters:   12/28/23 127/78   12/07/23 145/74   11/30/23 132/84     Wt Readings from Last 3 Encounters:   12/28/23 84.9 kg (187 lb 3.2 oz)   12/07/23 81.2 kg (179 lb)   11/30/23 80.9 kg (178 lb 6.4 oz)         Physical Exam   Appearance: No acute distress, well-nourished  Head: normocephalic, atraumatic  Eyes: extraocular movements intact, no scleral icterus, no conjunctival injection  Ears, Nose, and Throat: external ears normal, nares patent, moist mucous membranes  Cardiovascular: regular rate. No edema.  Respiratory: breathing comfortably, symmetric chest rise  Neuro: alert and oriented to time, place, and person. Normal gait  Psych: normal mood and affect     Result Review :   The following data was reviewed by: Louie Jang Jr, MD on 12/28/2023:  Common labs          5/25/2023    09:16 9/7/2023    12:15 11/30/2023    08:54   Common Labs   Glucose 110  134  120    BUN 21  21  20    Creatinine 0.83  0.80  0.78    Sodium 142  143  141    Potassium 4.6  3.9  4.4    Chloride 104  109  104    Calcium 10.7  10.6  10.1    Albumin 5.0   4.8    Total Bilirubin 0.2   0.4    Alkaline Phosphatase 56   64    AST (SGOT) 17   22    ALT (SGPT) 15   22    WBC 5.83   5.70    Hemoglobin 13.5   13.8    Hematocrit 41.5   41.8    Platelets 236   261    Total Cholesterol 109   123    Triglycerides 68   72    HDL Cholesterol 62   69    LDL Cholesterol  33   39    Hemoglobin A1C 6.50   6.60    Microalbumin, Urine 1.4          Lab Results   Component Value Date    INR 0.99 (L) 05/11/2021          Assessment and Plan    Diagnoses and all orders for this visit:    1. Sciatica of left side (Primary)  Comments:  will add daytime dosing of gabapentin. obtain " x-ray and lumbar MRI of spine.  Orders:  -     gabapentin (NEURONTIN) 600 MG tablet; Take 1 tablet by mouth At Night As Needed (pain).  Dispense: 30 tablet; Refill: 0  -     gabapentin (NEURONTIN) 300 MG capsule; Take 1 capsule by mouth 2 (Two) Times a Day As Needed (pain).  Dispense: 60 capsule; Refill: 0  -     XR Spine Lumbar 4+ View; Future  -     MRI Lumbar Spine Without Contrast; Future    2. Type 2 diabetes mellitus with hyperglycemia, without long-term current use of insulin  Comments:  inc mounjaro to 5mg dosage. f/u in 4 weeks regardin next step.  Orders:  -     Tirzepatide (MOUNJARO) 5 MG/0.5ML solution pen-injector pen; Inject 0.5 mL under the skin into the appropriate area as directed 1 (One) Time Per Week.  Dispense: 2 mL; Refill: 0    3. Chronic pain of left knee  Comments:  obtain x-ray to further eval  Orders:  -     XR Knee 3 View Left; Future          Medications Discontinued During This Encounter   Medication Reason    Tirzepatide (Mounjaro) 2.5 MG/0.5ML solution pen-injector Alternate therapy    Trulicity 4.5 MG/0.5ML solution pen-injector Alternate therapy    gabapentin (NEURONTIN) 600 MG tablet Reorder          Follow Up   Return if symptoms worsen or fail to improve.  Patient was given instructions and counseling regarding her condition or for health maintenance advice. Please see specific information pulled into the AVS if appropriate.       Louie Jang Jr, MD  12/28/23  11:29 EST

## 2024-01-02 ENCOUNTER — TELEPHONE (OUTPATIENT)
Dept: SURGERY | Facility: CLINIC | Age: 64
End: 2024-01-02
Payer: COMMERCIAL

## 2024-01-15 RX ORDER — CARVEDILOL 6.25 MG/1
6.25 TABLET ORAL 2 TIMES DAILY
Qty: 180 TABLET | Refills: 0 | Status: SHIPPED | OUTPATIENT
Start: 2024-01-15

## 2024-01-16 ENCOUNTER — OFFICE VISIT (OUTPATIENT)
Dept: OBSTETRICS AND GYNECOLOGY | Facility: CLINIC | Age: 64
End: 2024-01-16
Payer: OTHER GOVERNMENT

## 2024-01-16 VITALS
HEIGHT: 64 IN | WEIGHT: 181 LBS | DIASTOLIC BLOOD PRESSURE: 80 MMHG | BODY MASS INDEX: 30.9 KG/M2 | SYSTOLIC BLOOD PRESSURE: 156 MMHG | HEART RATE: 83 BPM

## 2024-01-16 DIAGNOSIS — I42.8 NICM (NONISCHEMIC CARDIOMYOPATHY): ICD-10-CM

## 2024-01-16 DIAGNOSIS — N95.1 MENOPAUSAL SYMPTOMS: Primary | ICD-10-CM

## 2024-01-16 DIAGNOSIS — N94.10 FEMALE DYSPAREUNIA: ICD-10-CM

## 2024-01-16 PROBLEM — R53.83 FATIGUE: Status: RESOLVED | Noted: 2021-07-27 | Resolved: 2024-01-16

## 2024-01-16 PROBLEM — M25.559 GREATER TROCHANTERIC PAIN SYNDROME: Status: ACTIVE | Noted: 2022-08-01

## 2024-01-16 PROBLEM — Z12.11 SCREENING FOR MALIGNANT NEOPLASM OF COLON: Status: RESOLVED | Noted: 2023-03-24 | Resolved: 2024-01-16

## 2024-01-16 PROBLEM — M77.8 LEFT SHOULDER TENDONITIS: Status: RESOLVED | Noted: 2023-05-02 | Resolved: 2024-01-16

## 2024-01-16 PROBLEM — M25.511 RIGHT SHOULDER PAIN: Status: RESOLVED | Noted: 2023-05-02 | Resolved: 2024-01-16

## 2024-01-16 PROBLEM — M47.817 LUMBOSACRAL SPONDYLOSIS WITHOUT MYELOPATHY: Status: ACTIVE | Noted: 2024-01-16

## 2024-01-16 PROCEDURE — 99204 OFFICE O/P NEW MOD 45 MIN: CPT | Performed by: STUDENT IN AN ORGANIZED HEALTH CARE EDUCATION/TRAINING PROGRAM

## 2024-01-16 RX ORDER — PAROXETINE 10 MG/1
10 TABLET, FILM COATED ORAL NIGHTLY
Qty: 30 TABLET | Refills: 11 | Status: SHIPPED | OUTPATIENT
Start: 2024-01-16

## 2024-01-16 RX ORDER — CYCLOPENTOLATE HYDROCHLORIDE 10 MG/ML
SOLUTION/ DROPS OPHTHALMIC
COMMUNITY

## 2024-01-16 RX ORDER — PAROXETINE 10 MG/1
10 TABLET, FILM COATED ORAL NIGHTLY
Qty: 30 TABLET | Refills: 11 | Status: CANCELLED | OUTPATIENT
Start: 2024-01-16

## 2024-01-16 RX ORDER — SPIRONOLACTONE 25 MG/1
25 TABLET ORAL DAILY
COMMUNITY

## 2024-01-16 RX ORDER — PRASTERONE 6.5 MG/1
1 INSERT VAGINAL NIGHTLY
Qty: 30 EACH | Refills: 11 | Status: SHIPPED | OUTPATIENT
Start: 2024-01-16

## 2024-01-16 NOTE — PROGRESS NOTES
Chief Complaint  Follow-up    Subjective            History of Present Illness  Silvai Massey is a 63-year-old female patient who presents to the office today for follow-up.  She has nonocclusive CAD, cardiomyopathy, hypertension, hyperlipidemia, and left bundle branch block.  She is compliant with medication. She reports constant mid back pain for the last 3 days that wraps around the left side chest.  She denies lightheadedness/dizziness, palpitations, or edema.    PMH  Past Medical History:   Diagnosis Date    Anemia     Arthritis of back     CTS (carpal tunnel syndrome) 2012    Depression     Diabetes mellitus     type 2    Forgetfulness     GERD (gastroesophageal reflux disease)     Greater trochanteric pain syndrome 08/01/2022    History of transfusion     Hypertension     Night sweats     Nonischemic cardiomyopathy 05/16/2022    Nonocclusive CAD 05/16/2022    Ovarian cyst     Periarthritis of shoulder 2010    Pneumonia     Sleep apnea     Voice hoarseness          ALLERGY  Allergies   Allergen Reactions    Metformin Unknown - Low Severity          SURGICALHX  Past Surgical History:   Procedure Laterality Date    BRONCHOSCOPY N/A 10/18/2018    Procedure: BRONCHOSCOPY WITH BIOPSIES, BAL;  Surgeon: Rayray Black MD;  Location: [facility] ENDOSCOPY;  Service: Pulmonary    COLONOSCOPY  2018    wnl     COLONOSCOPY N/A 9/6/2023    Procedure: COLONOSCOPY;  Surgeon: Melecio Maldonado MD;  Location: [facility] ENDOSCOPY;  Service: General;  Laterality: N/A;  NORMAL COLONOSCOPY    ENDOSCOPY  2017    EYE SURGERY  07/09/2019    blepharoplasty    LYMPHANGIOMA EXCISION      OOPHORECTOMY      SINUS SURGERY      TUBAL ABDOMINAL LIGATION            SOC  Social History     Socioeconomic History    Marital status:    Tobacco Use    Smoking status: Former     Packs/day: 0.50     Years: 10.00     Additional pack years: 0.00     Total pack years: 5.00     Types: Cigarettes     Start date: 4/11/1977     Quit date:  10/11/1987     Years since quittin.2    Smokeless tobacco: Former    Tobacco comments:     25 years ago    Vaping Use    Vaping Use: Never used   Substance and Sexual Activity    Alcohol use: Yes     Alcohol/week: 2.0 standard drinks of alcohol     Types: 2 Glasses of wine per week     Comment: occasionally drinks, 1 drink per day, has been drinking for 6-10 yrs,1 beer per night     Drug use: No    Sexual activity: Yes     Partners: Male     Birth control/protection: Hysterectomy     Comment: Partcial hysterectomy  one ivary and one tube         FAMHX  Family History   Problem Relation Age of Onset    Heart disease Father     Uterine cancer Mother     Cancer Mother     Diabetes Brother     Heart disease Brother     Diabetes Other     Diabetes Other     Breast cancer Neg Hx     Ovarian cancer Neg Hx     Colon cancer Neg Hx           MEDSIGONLY  Current Outpatient Medications on File Prior to Visit   Medication Sig    baclofen (LIORESAL) 10 MG tablet TAKE 1 TABLET BY MOUTH THREE TIMES DAILY    carvedilol (COREG) 6.25 MG tablet Take 1 tablet by mouth twice daily    cyclopentolate (CYCLOGYL) 1 % ophthalmic solution     Diclofenac Sodium (VOLTAREN) 1 % gel gel Apply 4 g topically to the appropriate area as directed 4 (Four) Times a Day As Needed (pain).    EQ Aspirin Adult Low Dose 81 MG EC tablet Take 1 tablet by mouth once daily    Farxiga 10 MG tablet TAKE 1 TABLET BY MOUTH ONCE DAILY IN THE MORNING    gabapentin (NEURONTIN) 300 MG capsule Take 1 capsule by mouth 2 (Two) Times a Day As Needed (pain).    gabapentin (NEURONTIN) 600 MG tablet Take 1 tablet by mouth At Night As Needed (pain).    meloxicam (MOBIC) 15 MG tablet Take 1 tablet by mouth Daily.    PARoxetine (Paxil) 10 MG tablet Take 1 tablet by mouth Every Night.    Prasterone (Intrarosa) 6.5 MG insert Insert 1 Application into the vagina Every Night.    rosuvastatin (CRESTOR) 10 MG tablet TAKE 1 TABLET BY MOUTH ONCE DAILY AT BEDTIME    spironolactone  "(ALDACTONE) 25 MG tablet Take 1 tablet by mouth Daily.    Tirzepatide (MOUNJARO) 5 MG/0.5ML solution pen-injector pen Inject 0.5 mL under the skin into the appropriate area as directed 1 (One) Time Per Week.    triamcinolone (KENALOG) 0.1 % ointment Apply 1 application topically to the appropriate area as directed 2 (Two) Times a Day.    valsartan (DIOVAN) 80 MG tablet Take 1 tablet by mouth Daily.    vitamin D (ERGOCALCIFEROL) 1.25 MG (56607 UT) capsule capsule Take 1 capsule by mouth once a week     No current facility-administered medications on file prior to visit.       Objective   /76   Pulse 90   Ht 162.6 cm (64\")   Wt 81.6 kg (180 lb)   BMI 30.90 kg/m²       Physical Exam      ECG 12 Lead    Date/Time: 1/17/2024 9:54 AM  Performed by: Cherelle Zavala APRN    Authorized by: Cherelle Zavala APRN  Comparison: compared with previous ECG from 5/18/2022  Similar to previous ECG  Rhythm: sinus rhythm  Rate: normal  BPM: 88  Conduction: left bundle branch block  ST Segments: ST segments normal  T Waves: T waves normal  QRS axis: normal  Other findings: left atrial abnormality    Clinical impression: abnormal EKG          Result Review :   The following data was reviewed by: GABRIELLA Cain on 01/17/2024:  proBNP   Date Value Ref Range Status   05/25/2023 42.5 0.0 - 900.0 pg/mL Final     CMP          11/30/2023    08:54   CMP   Glucose 120    BUN 20    Creatinine 0.78    EGFR 85.5    Sodium 141    Potassium 4.4    Chloride 104    Calcium 10.1    Total Protein 7.4    Albumin 4.8    Globulin 2.6    Total Bilirubin 0.4    Alkaline Phosphatase 64    AST (SGOT) 22    ALT (SGPT) 22    Albumin/Globulin Ratio 1.8    BUN/Creatinine Ratio 25.6    Anion Gap 12.0      CBC w/diff          11/30/2023    08:54   CBC w/Diff   WBC 5.70    RBC 4.97    Hemoglobin 13.8    Hematocrit 41.8    MCV 84.1    MCH 27.8    MCHC 33.0    RDW 13.1    Platelets 261    Neutrophil Rel % 53.0    Immature Granulocyte Rel % " "0.2    Lymphocyte Rel % 36.8    Monocyte Rel % 6.3    Eosinophil Rel % 2.3    Basophil Rel % 1.4       Lab Results   Component Value Date    TSH 1.170 10/23/2019      No results found for: \"FREET4\"   No results found for: \"DDIMERQUANT\"  No results found for: \"MG\"   No results found for: \"DIGOXIN\"   No results found for: \"TROPONINT\"        Lipid Panel          11/30/2023    08:54   Lipid Panel   Total Cholesterol 123    Triglycerides 72    HDL Cholesterol 69    VLDL Cholesterol 15    LDL Cholesterol  39    LDL/HDL Ratio 0.57        Results for orders placed in visit on 05/08/23    Adult Transthoracic Echo Complete W/ Cont if Necessary Per Protocol    Interpretation Summary    Left ventricular systolic function is mildly decreased. Left ventricular ejection fraction appears to be 36 - 40%.    The left ventricular cavity is mildly dilated.    Left ventricular diastolic function is consistent with (grade Ia w/high LAP) impaired relaxation.         Assessment and Plan    Diagnoses and all orders for this visit:    1. Nonocclusive CAD (Primary)  She describes some atypical chest pain that has been constant for the last 3 days.  EKG performed in office today with no change from prior EKGs.  Obtain stress test to rule out ischemia.  Continue aspirin 81 mg daily.  -     Stress Test With Myocardial Perfusion One Day; Future    2. Nonischemic cardiomyopathy  Symptomatically stable at this time and euvolemic on exam today, continue carvedilol 6.25 mg twice daily, Farxiga 10 mg daily, spironolactone 25 mg daily, and valsartan 80 mg daily.    3. Benign essential HTN  Currently controlled and without adverse effects from medication, continue current doses of carvedilol, spironolactone, and valsartan.    4. Hyperlipidemia LDL goal <70  Last lipid panel was 11/30/2023 with LDL 39 which is within goal range, continue rosuvastatin 10 mg nightly.    5. LBBB (left bundle branch block)  No change from prior EKG, obtaining stress test to " rule out ischemia.  -     ECG 12 Lead            Follow Up   Return in about 6 months (around 7/17/2024) for Follow up with Dr Santana.    Patient was given instructions and counseling regarding her condition or for health maintenance advice. Please see specific information pulled into the AVS if appropriate.     Silvia Massey  reports that she quit smoking about 36 years ago. Her smoking use included cigarettes. She started smoking about 46 years ago. She has a 5.00 pack-year smoking history. She has quit using smokeless tobacco.         Cherelle Zavala, GABRIELLA  01/17/24  16:09 EST    Dictated Utilizing Dragon Dictation

## 2024-01-16 NOTE — PROGRESS NOTES
New GYN Problem Visit     Chief Complaint   Patient presents with    Consult     Discuss HRT           HPI  Silvia Massey is a 63 y.o. female, , who presents as a new gynecology patient.    Ms. Massey is experiencing hot flashes, which have been ongoing for years. She had her last period 13 to 14 years. She continues to experience sweating which is severe at nights.     She is not currently sexually active because she has no desire to do so. She denies experiencing any vaginal discharge, irritation, or dryness. She is . Her last sexual intercourse was approximately 3 months ago. She complains of dyspareunia. She denies any concerns for urinary incontinence.    Her blood pressure is elevated today at 156/80 mmHg. She believes this is due to driving. She is currently taking spironolactone, carvedilol, and valsartan. She denies experiencing myocardial infarction, but she has a history of congestive heart failure. The cause of her congestive heart failure remains unknown, but she is currently being followed by a cardiologist. She believes her last ejection fraction was approximately 40 percent.    She is also currently taking baclofen for aches and pains, cyclopentolate for an ophthalmic solution, Voltaren gel for arthritis, baby aspirin daily, Neurontin for back pain every other day which helps her symptoms, meloxicam, Crestor, Mounjaro which she began taking on 2023. Prior to that she has been taking Trulicity. She also takes vitamin D for bone health.     [facility] She had an abnormal mammogram approximately 15 years ago, but further evaluation revealed she had fatty tissue. They also do her Pap smears for cervical cancer screening. She denies having any abnormal Pap smears. Ms. Massey denies being diagnosed with cancer.     The patient still has 1 ovary, 1 fallopian tube and her uterus.    Her mother was diagnosed with uterine cancer at the age of 53.  "      Additional OB/GYN History   No LMP recorded. Patient is postmenopausal.    Allergies : Metformin     The additional following portions of the patient's history were reviewed and updated as appropriate: allergies, current medications, past family history, past medical history, past social history, past surgical history, and problem list.    Review of Systems   Constitutional:  Negative for fatigue and fever.   Respiratory:  Negative for cough and shortness of breath.    Cardiovascular:  Negative for chest pain.   Gastrointestinal:  Negative for abdominal distention and abdominal pain.   Endocrine: Positive for cold intolerance and heat intolerance.   Genitourinary:  Positive for decreased libido and dyspareunia. Negative for difficulty urinating and dysuria.       I have reviewed and agree with the HPI, ROS, and historical information as entered above. Juam Em MD    Objective   /80   Pulse 83   Ht 162.6 cm (64\")   Wt 82.1 kg (181 lb)   BMI 31.07 kg/m²     Physical Exam  Vitals and nursing note reviewed.   Constitutional:       General: She is not in acute distress.     Appearance: Normal appearance. She is not toxic-appearing.   HENT:      Head: Normocephalic and atraumatic.   Eyes:      Extraocular Movements: Extraocular movements intact.      Conjunctiva/sclera: Conjunctivae normal.   Cardiovascular:      Pulses: Normal pulses.   Pulmonary:      Effort: Pulmonary effort is normal.   Abdominal:      General: There is no distension.      Palpations: Abdomen is soft.      Tenderness: There is no abdominal tenderness.   Genitourinary:     Comments: Patient deferred  Musculoskeletal:      Cervical back: Normal range of motion.   Skin:     General: Skin is warm and dry.   Neurological:      Mental Status: She is alert and oriented to person, place, and time.   Psychiatric:         Mood and Affect: Mood normal.         Behavior: Behavior normal.         Thought Content: Thought content normal. "            Assessment and Plan  Silvia Massey is a 63 y.o.  presenting as a new gynecology patient to discuss hormone replacement therapy for symptomatic hot flashes as well as dyspareunia.  Patient more than 10 years removed from menopause with history of congestive heart failure with reported last ejection fraction of 40%.  Discussed with patient risk and benefits of hormone replacement therapy.  Given the time from menopause as well as cardiovascular risk factors after discussion patient does not desire to be started on systemic hormone replacement therapy.  Did discuss with patient nonhormonal options including SSRI, paroxetine, clonidine and neurokinin inhibitor.  In regards to patient's concerns for dyspareunia discussed use of topical hormones.  Patient is desiring to trial Intrarosa.  Patient to take medications nightly.  [facility]  [facility] and reports normal mammograms as well as normal history of Pap smears.  She will bring in records in 6 months to be added to her medical record.  Patient was also given conservative measures to assist with symptoms of menopause.  Return to office in 6 months for follow-up.    Diagnoses and all orders for this visit:    1. Menopausal symptoms (Primary)  -     PARoxetine (Paxil) 10 MG tablet; Take 1 tablet by mouth Every Night.  Dispense: 30 tablet; Refill: 11    2. Female dyspareunia  -     Prasterone (Intrarosa) 6.5 MG insert; Insert 1 Application into the vagina Every Night.  Dispense: 30 each; Refill: 11    3. Nonischemic cardiomyopathy  Overview:  Entresto possible allergic reaction with hives        1. Menopausal symptoms (Primary)  I am wary of placing her on hormone replacement therapy because the risks do outweigh the benefits. I will give her more information regarding this. I will prescribe paroxetine nightly to help her with the menopausal symptoms.    2.  Dyspareunia   Intrarosa was prescribed to help with painful penile insertion.     Patient will return in 6 months.    She understands the importance of having the above orders performed in a timely fashion.  The risks of not performing them include, but are not limited to, cancer and/or subsequent increase in morbidity and/or mortality.  She is encouraged to review her results online and/or contact or office if she has questions.     Follow Up:  Return in about 6 weeks (around 2/27/2024) for Next scheduled follow up.      Juma Em MD  01/16/2024    Transcribed from ambient dictation for Juma Em MD by Crys Allen.  01/16/24   10:27 EST    Patient or patient representative verbalized consent to the visit recording.  I have personally performed the services described in this document as transcribed by the above individual, and it is both accurate and complete.

## 2024-01-17 ENCOUNTER — HOSPITAL ENCOUNTER (OUTPATIENT)
Dept: MRI IMAGING | Facility: HOSPITAL | Age: 64
Discharge: HOME | End: 2024-01-17
Admitting: INTERNAL MEDICINE
Payer: COMMERCIAL

## 2024-01-17 ENCOUNTER — OFFICE VISIT (OUTPATIENT)
Dept: CARDIOLOGY | Facility: CLINIC | Age: 64
End: 2024-01-17
Payer: COMMERCIAL

## 2024-01-17 ENCOUNTER — PATIENT ROUNDING (BHMG ONLY) (OUTPATIENT)
Dept: OBSTETRICS AND GYNECOLOGY | Facility: CLINIC | Age: 64
End: 2024-01-17
Payer: COMMERCIAL

## 2024-01-17 VITALS
BODY MASS INDEX: 30.73 KG/M2 | SYSTOLIC BLOOD PRESSURE: 124 MMHG | WEIGHT: 180 LBS | DIASTOLIC BLOOD PRESSURE: 76 MMHG | HEART RATE: 90 BPM | HEIGHT: 64 IN

## 2024-01-17 DIAGNOSIS — I25.10 NONOCCLUSIVE CORONARY ATHEROSCLEROSIS OF NATIVE CORONARY ARTERY: Primary | ICD-10-CM

## 2024-01-17 DIAGNOSIS — M54.32 SCIATICA OF LEFT SIDE: ICD-10-CM

## 2024-01-17 DIAGNOSIS — I42.8 NICM (NONISCHEMIC CARDIOMYOPATHY): ICD-10-CM

## 2024-01-17 DIAGNOSIS — I44.7 LBBB (LEFT BUNDLE BRANCH BLOCK): ICD-10-CM

## 2024-01-17 DIAGNOSIS — I10 BENIGN ESSENTIAL HTN: ICD-10-CM

## 2024-01-17 DIAGNOSIS — E78.5 HYPERLIPIDEMIA LDL GOAL <70: ICD-10-CM

## 2024-01-17 PROCEDURE — 72148 MRI LUMBAR SPINE W/O DYE: CPT

## 2024-01-18 ENCOUNTER — TELEPHONE (OUTPATIENT)
Dept: INTERNAL MEDICINE | Facility: CLINIC | Age: 64
End: 2024-01-18
Payer: COMMERCIAL

## 2024-01-18 DIAGNOSIS — M51.36 DDD (DEGENERATIVE DISC DISEASE), LUMBAR: Primary | ICD-10-CM

## 2024-01-18 NOTE — TELEPHONE ENCOUNTER
Pt made aware no further questions or concerns at this time.  ----- Message from Louie Jang Jr., MD sent at 1/18/2024  8:06 AM EST -----  Degenerative disc disease noted which is like osteoarthritis of the spine. Recommend follow-up with Neurosurgery for treatment options.

## 2024-01-18 NOTE — TELEPHONE ENCOUNTER
----- Message from Louie Jang Jr., MD sent at 1/18/2024  8:06 AM EST -----  Degenerative disc disease noted which is like osteoarthritis of the spine. Recommend follow-up with Neurosurgery for treatment options.

## 2024-01-19 ENCOUNTER — TELEPHONE (OUTPATIENT)
Dept: SURGERY | Facility: CLINIC | Age: 64
End: 2024-01-19
Payer: COMMERCIAL

## 2024-01-20 DIAGNOSIS — E11.65 TYPE 2 DIABETES MELLITUS WITH HYPERGLYCEMIA, WITHOUT LONG-TERM CURRENT USE OF INSULIN: ICD-10-CM

## 2024-01-22 ENCOUNTER — PATIENT MESSAGE (OUTPATIENT)
Dept: INTERNAL MEDICINE | Facility: CLINIC | Age: 64
End: 2024-01-22
Payer: COMMERCIAL

## 2024-01-22 RX ORDER — TIRZEPATIDE 5 MG/.5ML
INJECTION, SOLUTION SUBCUTANEOUS
Qty: 4 ML | Refills: 0 | Status: SHIPPED | OUTPATIENT
Start: 2024-01-22 | End: 2024-01-22 | Stop reason: ALTCHOICE

## 2024-01-22 NOTE — TELEPHONE ENCOUNTER
I spoke with patient to see if she wanted to reschedule this. Patient states that she got a voicemail from someone needing more clearance from cardiology. There is nothing on our end regarding but the phone number that called seems to be PAT. Patient is going to call them back to find out what exactly it is. Patient getting stress test done on Feb 2nd. Patient stated she would call back.

## 2024-01-22 NOTE — TELEPHONE ENCOUNTER
From: Silvia Massey  To: Louie Jang  Sent: 1/22/2024 1:46 PM EST  Subject: Humberto Jang,  Should the prescription be 7.5 mg rather than 5mg?    Pat

## 2024-01-26 ENCOUNTER — TELEPHONE (OUTPATIENT)
Dept: OBSTETRICS AND GYNECOLOGY | Facility: CLINIC | Age: 64
End: 2024-01-26
Payer: COMMERCIAL

## 2024-01-26 NOTE — TELEPHONE ENCOUNTER
Harish at [facility] called stating they are not able to get/order Intrarosa. They are able to get Vagifem or estrace/premarin cream. They wanted to know what they could change her script to. Please advise.

## 2024-01-29 RX ORDER — DAPAGLIFLOZIN 10 MG/1
TABLET, FILM COATED ORAL
Qty: 90 TABLET | Refills: 0 | Status: SHIPPED | OUTPATIENT
Start: 2024-01-29

## 2024-01-30 NOTE — TELEPHONE ENCOUNTER
Eloy at [facility] is calling to check on the status of this change. Please advise since Dr. Em is out on medical leave.

## 2024-01-31 NOTE — TELEPHONE ENCOUNTER
Spoke to the patient and she has already had it filled at a local pharmacy. [facility] contacted and Intrarosa script cancelled.

## 2024-02-02 ENCOUNTER — HOSPITAL ENCOUNTER (OUTPATIENT)
Dept: NUCLEAR MEDICINE | Facility: HOSPITAL | Age: 64
Discharge: HOME | End: 2024-02-02
Payer: COMMERCIAL

## 2024-02-02 ENCOUNTER — TELEPHONE (OUTPATIENT)
Dept: CARDIOLOGY | Facility: CLINIC | Age: 64
End: 2024-02-02
Payer: COMMERCIAL

## 2024-02-02 DIAGNOSIS — I25.10 NONOCCLUSIVE CORONARY ATHEROSCLEROSIS OF NATIVE CORONARY ARTERY: ICD-10-CM

## 2024-02-02 LAB
BH CV IMMEDIATE POST RECOVERY TECH DATA SYMPTOMS: NORMAL
BH CV IMMEDIATE POST TECH DATA BLOOD PRESSURE: NORMAL MMHG
BH CV IMMEDIATE POST TECH DATA HEART RATE: 110 BPM
BH CV IMMEDIATE POST TECH DATA OXYGEN SATS: 99 %
BH CV REST NUCLEAR ISOTOPE DOSE: 10 MCI
BH CV SIX MINUTE RECOVERY TECH DATA BLOOD PRESSURE: NORMAL
BH CV SIX MINUTE RECOVERY TECH DATA HEART RATE: 102 BPM
BH CV SIX MINUTE RECOVERY TECH DATA OXYGEN SATURATION: 97 %
BH CV STRESS BP STAGE 1: NORMAL
BH CV STRESS COMMENTS STAGE 1: NORMAL
BH CV STRESS DOSE REGADENOSON STAGE 1: 0.4
BH CV STRESS DURATION MIN STAGE 1: 0
BH CV STRESS DURATION SEC STAGE 1: 10
BH CV STRESS HR STAGE 1: 90
BH CV STRESS NUCLEAR ISOTOPE DOSE: 37.2 MCI
BH CV STRESS O2 STAGE 1: 99
BH CV STRESS PROTOCOL 1: NORMAL
BH CV STRESS RECOVERY BP: NORMAL MMHG
BH CV STRESS RECOVERY HR: 102 BPM
BH CV STRESS RECOVERY O2: 97 %
BH CV STRESS STAGE 1: 1
BH CV THREE MINUTE POST TECH DATA BLOOD PRESSURE: NORMAL MMHG
BH CV THREE MINUTE POST TECH DATA HEART RATE: 107 BPM
BH CV THREE MINUTE POST TECH DATA OXYGEN SATURATION: 98 %
LV EF NUC BP: 25 %
MAXIMAL PREDICTED HEART RATE: 157 BPM
PERCENT MAX PREDICTED HR: 70.06 %
STRESS BASELINE BP: NORMAL MMHG
STRESS BASELINE HR: 90 BPM
STRESS O2 SAT REST: 95 %
STRESS PERCENT HR: 82 %
STRESS POST O2 SAT PEAK: 99 %
STRESS POST PEAK BP: NORMAL MMHG
STRESS POST PEAK HR: 110 BPM
STRESS TARGET HR: 133 BPM

## 2024-02-02 PROCEDURE — 25010000002 REGADENOSON 0.4 MG/5ML SOLUTION: Performed by: NURSE PRACTITIONER

## 2024-02-02 PROCEDURE — 78452 HT MUSCLE IMAGE SPECT MULT: CPT

## 2024-02-02 PROCEDURE — 0 TECHNETIUM TETROFOSMIN KIT: Performed by: NURSE PRACTITIONER

## 2024-02-02 PROCEDURE — A9502 TC99M TETROFOSMIN: HCPCS | Performed by: NURSE PRACTITIONER

## 2024-02-02 PROCEDURE — 93017 CV STRESS TEST TRACING ONLY: CPT

## 2024-02-02 RX ORDER — REGADENOSON 0.08 MG/ML
0.4 INJECTION, SOLUTION INTRAVENOUS
Status: COMPLETED | OUTPATIENT
Start: 2024-02-02 | End: 2024-02-02

## 2024-02-02 RX ADMIN — TETROFOSMIN 1 DOSE: 1.38 INJECTION, POWDER, LYOPHILIZED, FOR SOLUTION INTRAVENOUS at 06:43

## 2024-02-02 RX ADMIN — TETROFOSMIN 1 DOSE: 1.38 INJECTION, POWDER, LYOPHILIZED, FOR SOLUTION INTRAVENOUS at 07:40

## 2024-02-02 RX ADMIN — REGADENOSON 0.4 MG: 0.08 INJECTION, SOLUTION INTRAVENOUS at 07:40

## 2024-02-02 NOTE — TELEPHONE ENCOUNTER
JENNIFFER patient. Went over results and recommendations. Patient is agreeable to ECHO.     Please order.

## 2024-02-02 NOTE — TELEPHONE ENCOUNTER
----- Message from Joceline Meek sent at 2/2/2024  2:13 PM EST -----    ----- Message -----  From: Kennedi Meyer RN  Sent: 2/2/2024   1:59 PM EST  To: [facility] Clinical Pool      ----- Message -----  From: Cherelle Zavala APRN  Sent: 2/2/2024   1:39 PM EST  To: Kennedi Meyer RN    Negative for ischemia however ejection fraction appears lower than before, obtain echocardiogram in the next week to verify this measurement

## 2024-02-05 ENCOUNTER — TELEPHONE (OUTPATIENT)
Dept: CARDIOLOGY | Facility: CLINIC | Age: 64
End: 2024-02-05
Payer: COMMERCIAL

## 2024-02-05 DIAGNOSIS — I44.7 LBBB (LEFT BUNDLE BRANCH BLOCK): Primary | ICD-10-CM

## 2024-02-05 DIAGNOSIS — I42.8 NICM (NONISCHEMIC CARDIOMYOPATHY): ICD-10-CM

## 2024-02-06 ENCOUNTER — HOSPITAL ENCOUNTER (OUTPATIENT)
Dept: CARDIOLOGY | Facility: HOSPITAL | Age: 64
Discharge: HOME | End: 2024-02-06
Admitting: NURSE PRACTITIONER
Payer: COMMERCIAL

## 2024-02-06 DIAGNOSIS — I44.7 LBBB (LEFT BUNDLE BRANCH BLOCK): ICD-10-CM

## 2024-02-06 DIAGNOSIS — I42.8 NICM (NONISCHEMIC CARDIOMYOPATHY): ICD-10-CM

## 2024-02-06 PROCEDURE — 93306 TTE W/DOPPLER COMPLETE: CPT

## 2024-02-08 ENCOUNTER — TELEPHONE (OUTPATIENT)
Dept: CARDIOLOGY | Facility: CLINIC | Age: 64
End: 2024-02-08
Payer: COMMERCIAL

## 2024-02-08 LAB
BH CV ECHO MEAS - ACS: 1.9 CM
BH CV ECHO MEAS - AO MAX PG: 5 MMHG
BH CV ECHO MEAS - AO MEAN PG: 3 MMHG
BH CV ECHO MEAS - AO ROOT DIAM: 3.2 CM
BH CV ECHO MEAS - AO V2 MAX: 112 CM/SEC
BH CV ECHO MEAS - AO V2 VTI: 19.6 CM
BH CV ECHO MEAS - AVA(I,D): 2.6 CM2
BH CV ECHO MEAS - EDV(CUBED): 91.1 ML
BH CV ECHO MEAS - EDV(MOD-SP2): 70.6 ML
BH CV ECHO MEAS - EDV(MOD-SP4): 79.2 ML
BH CV ECHO MEAS - EF(MOD-BP): 32.9 %
BH CV ECHO MEAS - EF(MOD-SP2): 28.2 %
BH CV ECHO MEAS - EF(MOD-SP4): 36.9 %
BH CV ECHO MEAS - ESV(CUBED): 59.3 ML
BH CV ECHO MEAS - ESV(MOD-SP2): 50.7 ML
BH CV ECHO MEAS - ESV(MOD-SP4): 50 ML
BH CV ECHO MEAS - FS: 13.3 %
BH CV ECHO MEAS - IVS/LVPW: 0.91 CM
BH CV ECHO MEAS - IVSD: 1 CM
BH CV ECHO MEAS - LA DIMENSION: 3.9 CM
BH CV ECHO MEAS - LAT PEAK E' VEL: 6.5 CM/SEC
BH CV ECHO MEAS - LV DIASTOLIC VOL/BSA (35-75): 42.9 CM2
BH CV ECHO MEAS - LV MASS(C)D: 164 GRAMS
BH CV ECHO MEAS - LV MAX PG: 3.9 MMHG
BH CV ECHO MEAS - LV MEAN PG: 2 MMHG
BH CV ECHO MEAS - LV SYSTOLIC VOL/BSA (12-30): 27.1 CM2
BH CV ECHO MEAS - LV V1 MAX: 98.5 CM/SEC
BH CV ECHO MEAS - LV V1 VTI: 16 CM
BH CV ECHO MEAS - LVIDD: 4.5 CM
BH CV ECHO MEAS - LVIDS: 3.9 CM
BH CV ECHO MEAS - LVOT AREA: 3.1 CM2
BH CV ECHO MEAS - LVOT DIAM: 2 CM
BH CV ECHO MEAS - LVPWD: 1.1 CM
BH CV ECHO MEAS - MED PEAK E' VEL: 4.4 CM/SEC
BH CV ECHO MEAS - MV A MAX VEL: 99.6 CM/SEC
BH CV ECHO MEAS - MV DEC TIME: 0.18 SEC
BH CV ECHO MEAS - MV E MAX VEL: 70.2 CM/SEC
BH CV ECHO MEAS - MV E/A: 0.7
BH CV ECHO MEAS - SI(MOD-SP2): 10.8 ML/M2
BH CV ECHO MEAS - SI(MOD-SP4): 15.8 ML/M2
BH CV ECHO MEAS - SV(LVOT): 50.3 ML
BH CV ECHO MEAS - SV(MOD-SP2): 19.9 ML
BH CV ECHO MEAS - SV(MOD-SP4): 29.2 ML
BH CV ECHO MEAS - TAPSE (>1.6): 1.72 CM
BH CV ECHO MEASUREMENTS AVERAGE E/E' RATIO: 12.88
LEFT ATRIUM VOLUME INDEX: 25.7 ML/M2
LV EF 2D ECHO EST: 30 %

## 2024-02-08 NOTE — TELEPHONE ENCOUNTER
Caller: Silvia Massey    Relationship: Self    Best call back number: [phone]    What is the best time to reach you: ANYTIME    Who are you requesting to speak with (clinical staff, provider,  specific staff member): CLINICAL       What was the call regarding: PATIENT HAD A ECHO ON 2/6/24 AND WOULD LIKE TO KNOW THE RESULTS.PLEASE CALL PATIENT IF RESULTS ARE BACK.     Is it okay if the provider responds through [facility]: YES

## 2024-02-09 ENCOUNTER — PREP FOR SURGERY (OUTPATIENT)
Dept: OTHER | Facility: HOSPITAL | Age: 64
End: 2024-02-09
Payer: COMMERCIAL

## 2024-02-09 ENCOUNTER — TELEPHONE (OUTPATIENT)
Dept: CARDIOLOGY | Facility: CLINIC | Age: 64
End: 2024-02-09
Payer: COMMERCIAL

## 2024-02-09 DIAGNOSIS — I42.8 NICM (NONISCHEMIC CARDIOMYOPATHY): Primary | ICD-10-CM

## 2024-02-09 DIAGNOSIS — I25.10 NONOCCLUSIVE CORONARY ATHEROSCLEROSIS OF NATIVE CORONARY ARTERY: ICD-10-CM

## 2024-02-09 DIAGNOSIS — I42.8 NICM (NONISCHEMIC CARDIOMYOPATHY): ICD-10-CM

## 2024-02-09 RX ORDER — SODIUM CHLORIDE 9 MG/ML
40 INJECTION, SOLUTION INTRAVENOUS AS NEEDED
OUTPATIENT
Start: 2024-02-09

## 2024-02-09 RX ORDER — SODIUM CHLORIDE 0.9 % (FLUSH) 0.9 %
10 SYRINGE (ML) INJECTION EVERY 12 HOURS SCHEDULED
OUTPATIENT
Start: 2024-02-09

## 2024-02-09 RX ORDER — SODIUM CHLORIDE 0.9 % (FLUSH) 0.9 %
10 SYRINGE (ML) INJECTION AS NEEDED
OUTPATIENT
Start: 2024-02-09

## 2024-02-09 RX ORDER — VALSARTAN 160 MG/1
160 TABLET ORAL DAILY
Qty: 90 TABLET | Refills: 3 | Status: SHIPPED | OUTPATIENT
Start: 2024-02-09

## 2024-02-09 RX ORDER — DULAGLUTIDE 4.5 MG/.5ML
INJECTION, SOLUTION SUBCUTANEOUS
COMMUNITY
Start: 2024-02-05

## 2024-02-09 NOTE — TELEPHONE ENCOUNTER
Called and discussed results with patient echocardiogram shows decrease in ejection fraction EF 30% with more prominent anterior/septal wall hypokinesis.  Patient reports some chest discomfort yesterday which was sharp only lasted a few minutes.  Overall her breathing symptoms remained stable.  Given the abnormality on her stress is showing perfusion abnormalities recommend proceeding with left heart catheterization given ongoing chest pain problems to see if this is still an nonischemic process or whether or not she has adult also ischemic heart disease reducing her ejection fraction further.  Patient was agreeable after going the respites alternatives including the risk of bleeding, kidney dysfunction, stroke, MI, and death.  Also will go ahead and titrate up on her valsartan to 160.  Will plan on seeing patient back in 3 months reassessing her EF she may be a candidate for BiV ICD if her EF does not improve as well

## 2024-02-09 NOTE — TELEPHONE ENCOUNTER
I spoke to patient and gave an arrival time of 8:00 on  for [facility]. Patient was instructed to have a  for the day of the procedure and to arrive at the main entrance/registration area. Patient was educated about stent placement and informed that in the event of stent placement, the patient will be required to stay overnight for observation. Patient was instructed to continue all medications as usual. Patient was instructed to be NPO after midnight with sips of water as needed. Patient was instructed to have labs completed 24-48 hours prior to procedure. Patient is agreeable with no other questions or concerns.

## 2024-02-12 ENCOUNTER — LAB (OUTPATIENT)
Dept: LAB | Facility: HOSPITAL | Age: 64
End: 2024-02-12
Payer: COMMERCIAL

## 2024-02-12 DIAGNOSIS — I42.8 NICM (NONISCHEMIC CARDIOMYOPATHY): Primary | ICD-10-CM

## 2024-02-12 DIAGNOSIS — I42.8 NICM (NONISCHEMIC CARDIOMYOPATHY): ICD-10-CM

## 2024-02-12 LAB
ANION GAP SERPL CALCULATED.3IONS-SCNC: 10.4 MMOL/L (ref 5–15)
APTT PPP: 28.5 SECONDS (ref 24.2–34.2)
BUN SERPL-MCNC: 12 MG/DL (ref 8–23)
BUN/CREAT SERPL: 15.2 (ref 7–25)
CALCIUM SPEC-SCNC: 10.3 MG/DL (ref 8.6–10.5)
CHLORIDE SERPL-SCNC: 104 MMOL/L (ref 98–107)
CO2 SERPL-SCNC: 26.6 MMOL/L (ref 22–29)
CREAT SERPL-MCNC: 0.79 MG/DL (ref 0.57–1)
DEPRECATED RDW RBC AUTO: 41.1 FL (ref 37–54)
EGFRCR SERPLBLD CKD-EPI 2021: 84.2 ML/MIN/1.73
ERYTHROCYTE [DISTWIDTH] IN BLOOD BY AUTOMATED COUNT: 13.4 % (ref 12.3–15.4)
GLUCOSE SERPL-MCNC: 117 MG/DL (ref 65–99)
HCT VFR BLD AUTO: 43.7 % (ref 34–46.6)
HGB BLD-MCNC: 13.9 G/DL (ref 12–15.9)
INR PPP: 1.03 (ref 0.86–1.15)
MCH RBC QN AUTO: 26.6 PG (ref 26.6–33)
MCHC RBC AUTO-ENTMCNC: 31.8 G/DL (ref 31.5–35.7)
MCV RBC AUTO: 83.6 FL (ref 79–97)
PLATELET # BLD AUTO: 228 10*3/MM3 (ref 140–450)
PMV BLD AUTO: 10.8 FL (ref 6–12)
POTASSIUM SERPL-SCNC: 3.9 MMOL/L (ref 3.5–5.2)
PROTHROMBIN TIME: 13.7 SECONDS (ref 11.8–14.9)
RBC # BLD AUTO: 5.23 10*6/MM3 (ref 3.77–5.28)
SODIUM SERPL-SCNC: 141 MMOL/L (ref 136–145)
WBC NRBC COR # BLD AUTO: 5.56 10*3/MM3 (ref 3.4–10.8)

## 2024-02-12 PROCEDURE — 85027 COMPLETE CBC AUTOMATED: CPT | Performed by: INTERNAL MEDICINE

## 2024-02-12 PROCEDURE — 85610 PROTHROMBIN TIME: CPT | Performed by: INTERNAL MEDICINE

## 2024-02-12 PROCEDURE — 85730 THROMBOPLASTIN TIME PARTIAL: CPT | Performed by: INTERNAL MEDICINE

## 2024-02-12 PROCEDURE — 80048 BASIC METABOLIC PNL TOTAL CA: CPT | Performed by: INTERNAL MEDICINE

## 2024-02-13 ENCOUNTER — HOSPITAL ENCOUNTER (OUTPATIENT)
Facility: HOSPITAL | Age: 64
Setting detail: HOSPITAL OUTPATIENT SURGERY
Discharge: HOME | End: 2024-02-13
Attending: INTERNAL MEDICINE | Admitting: INTERNAL MEDICINE
Payer: COMMERCIAL

## 2024-02-13 VITALS
HEART RATE: 95 BPM | OXYGEN SATURATION: 98 % | RESPIRATION RATE: 16 BRPM | HEIGHT: 64 IN | BODY MASS INDEX: 30.71 KG/M2 | DIASTOLIC BLOOD PRESSURE: 77 MMHG | WEIGHT: 179.9 LBS | SYSTOLIC BLOOD PRESSURE: 133 MMHG

## 2024-02-13 DIAGNOSIS — I42.8 NICM (NONISCHEMIC CARDIOMYOPATHY): ICD-10-CM

## 2024-02-13 DIAGNOSIS — I25.10 NONOCCLUSIVE CORONARY ATHEROSCLEROSIS OF NATIVE CORONARY ARTERY: ICD-10-CM

## 2024-02-13 LAB
ANION GAP SERPL CALCULATED.3IONS-SCNC: 10.9 MMOL/L (ref 5–15)
BUN SERPL-MCNC: 14 MG/DL (ref 8–23)
BUN/CREAT SERPL: 20 (ref 7–25)
CALCIUM SPEC-SCNC: 9.9 MG/DL (ref 8.6–10.5)
CHLORIDE SERPL-SCNC: 104 MMOL/L (ref 98–107)
CO2 SERPL-SCNC: 25.1 MMOL/L (ref 22–29)
CREAT SERPL-MCNC: 0.7 MG/DL (ref 0.57–1)
DEPRECATED RDW RBC AUTO: 41.1 FL (ref 37–54)
EGFRCR SERPLBLD CKD-EPI 2021: 97.3 ML/MIN/1.73
ERYTHROCYTE [DISTWIDTH] IN BLOOD BY AUTOMATED COUNT: 13.4 % (ref 12.3–15.4)
GLUCOSE SERPL-MCNC: 110 MG/DL (ref 65–99)
HCT VFR BLD AUTO: 43.3 % (ref 34–46.6)
HGB BLD-MCNC: 13.8 G/DL (ref 12–15.9)
INR PPP: 1.01 (ref 0.86–1.15)
MCH RBC QN AUTO: 26.8 PG (ref 26.6–33)
MCHC RBC AUTO-ENTMCNC: 31.9 G/DL (ref 31.5–35.7)
MCV RBC AUTO: 84.1 FL (ref 79–97)
PLATELET # BLD AUTO: 211 10*3/MM3 (ref 140–450)
PMV BLD AUTO: 10.6 FL (ref 6–12)
POTASSIUM SERPL-SCNC: 4.3 MMOL/L (ref 3.5–5.2)
PROTHROMBIN TIME: 13.5 SECONDS (ref 11.8–14.9)
RBC # BLD AUTO: 5.15 10*6/MM3 (ref 3.77–5.28)
SODIUM SERPL-SCNC: 140 MMOL/L (ref 136–145)
WBC NRBC COR # BLD AUTO: 5.1 10*3/MM3 (ref 3.4–10.8)

## 2024-02-13 PROCEDURE — 25010000002 MIDAZOLAM PER 1MG: Performed by: INTERNAL MEDICINE

## 2024-02-13 PROCEDURE — 25010000002 HEPARIN (PORCINE) PER 1000 UNITS: Performed by: INTERNAL MEDICINE

## 2024-02-13 PROCEDURE — 99152 MOD SED SAME PHYS/QHP 5/>YRS: CPT | Performed by: INTERNAL MEDICINE

## 2024-02-13 PROCEDURE — C1894 INTRO/SHEATH, NON-LASER: HCPCS | Performed by: INTERNAL MEDICINE

## 2024-02-13 PROCEDURE — 25510000001 IOPAMIDOL PER 1 ML: Performed by: INTERNAL MEDICINE

## 2024-02-13 PROCEDURE — 25010000002 FENTANYL CITRATE (PF) 100 MCG/2ML SOLUTION: Performed by: INTERNAL MEDICINE

## 2024-02-13 PROCEDURE — 93458 L HRT ARTERY/VENTRICLE ANGIO: CPT | Performed by: INTERNAL MEDICINE

## 2024-02-13 PROCEDURE — 85610 PROTHROMBIN TIME: CPT | Performed by: NURSE PRACTITIONER

## 2024-02-13 PROCEDURE — C1769 GUIDE WIRE: HCPCS | Performed by: INTERNAL MEDICINE

## 2024-02-13 PROCEDURE — 80048 BASIC METABOLIC PNL TOTAL CA: CPT | Performed by: NURSE PRACTITIONER

## 2024-02-13 PROCEDURE — 85027 COMPLETE CBC AUTOMATED: CPT | Performed by: NURSE PRACTITIONER

## 2024-02-13 RX ORDER — HEPARIN SODIUM 1000 [USP'U]/ML
INJECTION, SOLUTION INTRAVENOUS; SUBCUTANEOUS
Status: DISCONTINUED | OUTPATIENT
Start: 2024-02-13 | End: 2024-02-13 | Stop reason: HOSPADM

## 2024-02-13 RX ORDER — SODIUM CHLORIDE 9 MG/ML
40 INJECTION, SOLUTION INTRAVENOUS AS NEEDED
Status: DISCONTINUED | OUTPATIENT
Start: 2024-02-13 | End: 2024-02-13 | Stop reason: HOSPADM

## 2024-02-13 RX ORDER — LIDOCAINE HYDROCHLORIDE 20 MG/ML
INJECTION, SOLUTION INFILTRATION; PERINEURAL
Status: DISCONTINUED | OUTPATIENT
Start: 2024-02-13 | End: 2024-02-13 | Stop reason: HOSPADM

## 2024-02-13 RX ORDER — SODIUM CHLORIDE 0.9 % (FLUSH) 0.9 %
10 SYRINGE (ML) INJECTION AS NEEDED
Status: DISCONTINUED | OUTPATIENT
Start: 2024-02-13 | End: 2024-02-13 | Stop reason: HOSPADM

## 2024-02-13 RX ORDER — FENTANYL CITRATE 50 UG/ML
INJECTION, SOLUTION INTRAMUSCULAR; INTRAVENOUS
Status: DISCONTINUED | OUTPATIENT
Start: 2024-02-13 | End: 2024-02-13 | Stop reason: HOSPADM

## 2024-02-13 RX ORDER — SODIUM CHLORIDE 0.9 % (FLUSH) 0.9 %
10 SYRINGE (ML) INJECTION EVERY 12 HOURS SCHEDULED
Status: DISCONTINUED | OUTPATIENT
Start: 2024-02-13 | End: 2024-02-13 | Stop reason: HOSPADM

## 2024-02-13 RX ORDER — MIDAZOLAM HYDROCHLORIDE 2 MG/2ML
INJECTION, SOLUTION INTRAMUSCULAR; INTRAVENOUS
Status: DISCONTINUED | OUTPATIENT
Start: 2024-02-13 | End: 2024-02-13 | Stop reason: HOSPADM

## 2024-03-01 ENCOUNTER — OFFICE VISIT (OUTPATIENT)
Dept: INTERNAL MEDICINE | Facility: CLINIC | Age: 64
End: 2024-03-01
Payer: COMMERCIAL

## 2024-03-01 VITALS
OXYGEN SATURATION: 96 % | DIASTOLIC BLOOD PRESSURE: 76 MMHG | HEART RATE: 89 BPM | TEMPERATURE: 97.8 F | HEIGHT: 64 IN | SYSTOLIC BLOOD PRESSURE: 117 MMHG | WEIGHT: 180 LBS | BODY MASS INDEX: 30.73 KG/M2

## 2024-03-01 DIAGNOSIS — M17.12 PRIMARY OSTEOARTHRITIS OF LEFT KNEE: Primary | ICD-10-CM

## 2024-03-01 DIAGNOSIS — B35.1 TOENAIL FUNGUS: ICD-10-CM

## 2024-03-01 DIAGNOSIS — M19.90 OSTEOARTHRITIS, UNSPECIFIED OSTEOARTHRITIS TYPE, UNSPECIFIED SITE: ICD-10-CM

## 2024-03-01 RX ORDER — MELOXICAM 15 MG/1
15 TABLET ORAL DAILY
Qty: 30 TABLET | Refills: 2 | Status: SHIPPED | OUTPATIENT
Start: 2024-03-01

## 2024-03-01 NOTE — PROGRESS NOTES
Chief Complaint  Leg Pain (Bilateral -- more towards the knees at night ), Foot Pain (Bilateral ), Hip Pain (Bilateral ), and Back Pain (Lower back )    Subjective      Silvia Massey is a 63 year old female that presents to [facility] with c/o pain in her legs, feet, back and knees. This has been going on for almost 3 months. Pain is milder during the day and worse at night when she lays down for bed. Knees and thighs feel achy. She has not changed her activity. No injury or trauma. She states she walks about 7-8,000 steps per day.   She did have an xray of her knee in December and an MRI of her low back in January.  She has not been taking her meloxicam for a while.    She also c/o a toenail problem. She has had a fungus to the right great toe that has split in the middle. It is not painful. She has not had any drainage or signs of infection.     History of Present Illness  .  Current Outpatient Medications   Medication Instructions    baclofen (LIORESAL) 10 MG tablet TAKE 1 TABLET BY MOUTH THREE TIMES DAILY    carvedilol (COREG) 6.25 mg, Oral, 2 Times Daily    cyclopentolate (CYCLOGYL) 1 % ophthalmic solution Administer 1 drop to both eyes Daily.    Diclofenac Sodium (VOLTAREN) 4 g, Topical, 4 Times Daily PRN    EQ Aspirin Adult Low Dose 81 MG EC tablet Take 1 tablet by mouth once daily    Farxiga 10 MG tablet TAKE 1 TABLET BY MOUTH ONCE DAILY IN THE MORNING    gabapentin (NEURONTIN) 600 mg, Oral, Nightly PRN    gabapentin (NEURONTIN) 300 mg, Oral, 2 Times Daily PRN    meloxicam (MOBIC) 15 mg, Oral, Daily    PARoxetine (PAXIL) 10 mg, Oral, Nightly    Prasterone (Intrarosa) 6.5 MG insert 1 Application, Vaginal, Nightly    rosuvastatin (CRESTOR) 10 MG tablet TAKE 1 TABLET BY MOUTH ONCE DAILY AT BEDTIME    spironolactone (ALDACTONE) 25 mg, Oral, Daily    triamcinolone (KENALOG) 0.1 % ointment 1 application , Topical, 2 Times Daily    Trulicity 4.5 MG/0.5ML  "solution pen-injector TAKES ON MONDAYS. LAST DOSE 2/5/24+    valsartan (DIOVAN) 160 mg, Oral, Daily    vitamin D (ERGOCALCIFEROL) 50,000 Units, Oral, Weekly       The following portions of the patient's history were reviewed and updated as appropriate: allergies, current medications, past family history, past medical history, past social history, past surgical history, and problem list.    Objective   Vital Signs:   /76 (BP Location: Right arm, Patient Position: Sitting, Cuff Size: Adult)   Pulse 89   Temp 97.8 °F (36.6 °C) (Temporal)   Ht 162.6 cm (64\")   Wt 81.6 kg (180 lb)   SpO2 96%   BMI 30.90 kg/m²     Wt Readings from Last 3 Encounters:   03/01/24 81.6 kg (180 lb)   02/09/24 81.6 kg (179 lb 14.3 oz)   01/17/24 81.6 kg (180 lb)     BP Readings from Last 3 Encounters:   03/01/24 117/76   02/13/24 133/77   01/17/24 124/76     Physical Exam  Vitals and nursing note reviewed.   Constitutional:       Appearance: Normal appearance.   HENT:      Head: Normocephalic and atraumatic.   Pulmonary:      Effort: Pulmonary effort is normal.   Feet:      Right foot:      Skin integrity: Skin integrity normal.      Toenail Condition: Right toenails are abnormally thick. Fungal disease present.     Comments: Splitting in middle of right great toenail.  Neurological:      Mental Status: She is alert and oriented to person, place, and time.   Psychiatric:         Mood and Affect: Mood normal.         Behavior: Behavior normal.          Result Review :  The following data was reviewed by: GABRIELLA Hilliard on 03/01/2024:            Lab Results (last 72 hours)       ** No results found for the last 72 hours. **             MRI Lumbar Spine Without Contrast    Result Date: 1/17/2024   Mild degenerative changes of the lumbar spine as above without significant canal or significant neural foraminal narrowing.      FERNANDO REDDY MD       Electronically Signed and Approved By: FERNANDO REDDY MD on 1/17/2024 at 8:36        "      XR Spine Lumbar Complete 4+VW    Result Date: 12/28/2023   Lumbar degenerative disease with no acute process demonstrated     ANASTACIO KIMBLE MD       Electronically Signed and Approved By: ANASTACIO KIMBLE MD on 12/28/2023 at 17:02             XR Knee 3 View Left    Result Date: 12/28/2023    1. Mild tricompartmental osteoarthritis. 2. No radiographic findings of acute knee abnormality.      MIKA SIMPSON MD       Electronically Signed and Approved By: MIKA SIMPSON MD on 12/28/2023 at 10:01                           Lab Results   Component Value Date    INR 1.01 02/13/2024    POCGLU 113 (H) 09/06/2023       Procedures        Assessment and Plan   Diagnoses and all orders for this visit:    1. Primary osteoarthritis of left knee (Primary)  -     meloxicam (MOBIC) 15 MG tablet; Take 1 tablet by mouth Daily.  Dispense: 30 tablet; Refill: 2    2. Osteoarthritis, unspecified osteoarthritis type, unspecified site  -     meloxicam (MOBIC) 15 MG tablet; Take 1 tablet by mouth Daily.  Dispense: 30 tablet; Refill: 2    3. Toenail fungus  -     Ambulatory Referral to Podiatry         Advised to resume meloxicam, refill sent. If no improvement may consider physical therapy. Pt declined at this time.         Medications Discontinued During This Encounter   Medication Reason    meloxicam (MOBIC) 15 MG tablet Reorder          Follow Up   No follow-ups on file.  Patient was given instructions and counseling regarding her condition or for health maintenance advice. Please see specific information pulled into the AVS if appropriate.       Sugey Dias, APRN  03/01/24  12:20 EST

## 2024-03-04 RX ORDER — HYDROGEN PEROXIDE 2.65 ML/100ML
LIQUID ORAL; TOPICAL
Qty: 30 TABLET | Refills: 0 | Status: SHIPPED | OUTPATIENT
Start: 2024-03-04

## 2024-03-07 ENCOUNTER — TELEPHONE (OUTPATIENT)
Dept: SURGERY | Facility: CLINIC | Age: 64
End: 2024-03-07
Payer: COMMERCIAL

## 2024-03-08 NOTE — TELEPHONE ENCOUNTER
Patient has been rescheduled to 3-25-24, scheduled with Radha in surgery scheduling. Patient is okay with date/time and voiced understanding.     Cardiac clearance is still good for this time frame.

## 2024-03-18 RX ORDER — ERGOCALCIFEROL 1.25 MG/1
50000 CAPSULE ORAL WEEKLY
Qty: 13 CAPSULE | Refills: 0 | Status: SHIPPED | OUTPATIENT
Start: 2024-03-18

## 2024-03-25 RX ORDER — ROSUVASTATIN CALCIUM 10 MG/1
TABLET, COATED ORAL
Qty: 90 TABLET | Refills: 0 | Status: SHIPPED | OUTPATIENT
Start: 2024-03-25

## 2024-04-08 RX ORDER — HYDROGEN PEROXIDE 2.65 ML/100ML
LIQUID ORAL; TOPICAL
Qty: 30 TABLET | Refills: 0 | Status: SHIPPED | OUTPATIENT
Start: 2024-04-08

## 2024-04-10 RX ORDER — CARVEDILOL 6.25 MG/1
6.25 TABLET ORAL 2 TIMES DAILY
Qty: 180 TABLET | Refills: 0 | Status: SHIPPED | OUTPATIENT
Start: 2024-04-10

## 2024-04-15 ENCOUNTER — TELEPHONE (OUTPATIENT)
Dept: SURGERY | Facility: CLINIC | Age: 64
End: 2024-04-15
Payer: COMMERCIAL

## 2024-04-15 NOTE — TELEPHONE ENCOUNTER
PATIENT WANTS TO CANCEL SURGERY ON 04/22/24.    [facility] AND [facility] OF HER NECK.  THEY DO NOT THINK HER PAIN IS CAUSED BY THE FATTY TISSUE IN HER NECK.    S[facility].    SHE WANTS TO CANCEL SURGERY WITH DR. VERA UNTIL SHE FINDS OUT ABOUT THE MRI.

## 2024-04-23 RX ORDER — DAPAGLIFLOZIN 10 MG/1
1 TABLET, FILM COATED ORAL EVERY MORNING
Qty: 90 TABLET | Refills: 0 | Status: SHIPPED | OUTPATIENT
Start: 2024-04-23

## 2024-04-24 ENCOUNTER — PRIOR AUTHORIZATION (OUTPATIENT)
Dept: INTERNAL MEDICINE | Facility: CLINIC | Age: 64
End: 2024-04-24
Payer: COMMERCIAL

## 2024-04-24 NOTE — TELEPHONE ENCOUNTER
PA REQUIRED FOR FOLLOWING MEDICATION:    dapagliflozin Propanediol 10 MG tablet (04/23/2024)     INDEXED VIA [employer]

## 2024-04-30 ENCOUNTER — TELEPHONE (OUTPATIENT)
Dept: INTERNAL MEDICINE | Facility: CLINIC | Age: 64
End: 2024-04-30
Payer: COMMERCIAL

## 2024-04-30 NOTE — TELEPHONE ENCOUNTER
Caller: Silvia Massey    Relationship: Self    Best call back number: [phone]    What form or medical record are you requesting: PAPERWORK FOR HANDICAP STICKER    How would you like to receive the form or medical records (pick-up, mail, fax):      Timeframe paperwork needed: AS SOON AS POSSIBLE     Additional notes: PATIENT'S HANDICAP STICKER EXPIRES TODAY, 04/30/2024.

## 2024-05-04 ENCOUNTER — PATIENT MESSAGE (OUTPATIENT)
Dept: INTERNAL MEDICINE | Facility: CLINIC | Age: 64
End: 2024-05-04
Payer: COMMERCIAL

## 2024-05-13 RX ORDER — HYDROGEN PEROXIDE 2.65 ML/100ML
LIQUID ORAL; TOPICAL
Qty: 30 TABLET | Refills: 0 | Status: SHIPPED | OUTPATIENT
Start: 2024-05-13

## 2024-05-30 ENCOUNTER — TELEPHONE (OUTPATIENT)
Dept: OBSTETRICS AND GYNECOLOGY | Facility: CLINIC | Age: 64
End: 2024-05-30
Payer: COMMERCIAL

## 2024-05-30 NOTE — TELEPHONE ENCOUNTER
Patient last saw Dr. Christopher 2/28/2024 as New patient - since seeing Dr. Christopher problem with incontinence has started.  Per patient they addressed this during the visit - but wasn't treated for it, said started right after the appointment.    Per [facility] - [facility] gave her the referral to [facility]YN (Dr Christopher) and advised her, she would need to follow up with Dr Christopher to see if he could give her a referral to Urology or advise if needs to see him, again.  Patient is coming back in 07/16/2024 for Annual.  Patient was advised someone from clinical would call her back at [phone].mp

## 2024-06-03 ENCOUNTER — OFFICE VISIT (OUTPATIENT)
Dept: INTERNAL MEDICINE | Facility: CLINIC | Age: 64
End: 2024-06-03
Payer: COMMERCIAL

## 2024-06-03 VITALS
HEART RATE: 88 BPM | TEMPERATURE: 96.3 F | SYSTOLIC BLOOD PRESSURE: 129 MMHG | BODY MASS INDEX: 29.91 KG/M2 | WEIGHT: 175.2 LBS | DIASTOLIC BLOOD PRESSURE: 82 MMHG | OXYGEN SATURATION: 96 % | HEIGHT: 64 IN

## 2024-06-03 DIAGNOSIS — B37.9 YEAST INFECTION: ICD-10-CM

## 2024-06-03 DIAGNOSIS — E78.5 HYPERLIPIDEMIA LDL GOAL <70: ICD-10-CM

## 2024-06-03 DIAGNOSIS — I10 BENIGN ESSENTIAL HTN: ICD-10-CM

## 2024-06-03 DIAGNOSIS — E11.65 TYPE 2 DIABETES MELLITUS WITH HYPERGLYCEMIA, WITHOUT LONG-TERM CURRENT USE OF INSULIN: Primary | ICD-10-CM

## 2024-06-03 DIAGNOSIS — Z79.899 ENCOUNTER FOR LONG-TERM (CURRENT) USE OF OTHER MEDICATIONS: ICD-10-CM

## 2024-06-03 DIAGNOSIS — I42.8 NICM (NONISCHEMIC CARDIOMYOPATHY): ICD-10-CM

## 2024-06-03 DIAGNOSIS — Z23 NEED FOR COVID-19 VACCINE: ICD-10-CM

## 2024-06-03 DIAGNOSIS — E55.9 VITAMIN D DEFICIENCY: ICD-10-CM

## 2024-06-03 LAB
ALBUMIN UR-MCNC: 2.6 MG/DL
AMPHET+METHAMPHET UR QL: NEGATIVE
AMPHETAMINE INTERNAL CONTROL: NORMAL
AMPHETAMINES UR QL: NEGATIVE
BARBITURATE INTERNAL CONTROL: NORMAL
BARBITURATES UR QL SCN: NEGATIVE
BENZODIAZ UR QL SCN: NEGATIVE
BENZODIAZEPINE INTERNAL CONTROL: NORMAL
BUPRENORPHINE INTERNAL CONTROL: NORMAL
BUPRENORPHINE SERPL-MCNC: NEGATIVE NG/ML
CANNABINOIDS SERPL QL: NEGATIVE
COCAINE INTERNAL CONTROL: NORMAL
COCAINE UR QL: NEGATIVE
CREAT UR-MCNC: 96 MG/DL
EXPIRATION DATE: NORMAL
Lab: NORMAL
MDMA (ECSTASY) INTERNAL CONTROL: NORMAL
MDMA UR QL SCN: NEGATIVE
METHADONE INTERNAL CONTROL: NORMAL
METHADONE UR QL SCN: NEGATIVE
METHAMPHETAMINE INTERNAL CONTROL: NORMAL
MICROALBUMIN/CREAT UR: 27.1 MG/G (ref 0–29)
MORPHINE INTERNAL CONTROL: NORMAL
MORPHINE/OPIATES SCREEN, URINE: NEGATIVE
OXYCODONE INTERNAL CONTROL: NORMAL
OXYCODONE UR QL SCN: NEGATIVE
PCP UR QL SCN: NEGATIVE
PHENCYCLIDINE INTERNAL CONTROL: NORMAL
THC INTERNAL CONTROL: NORMAL

## 2024-06-03 PROCEDURE — 99214 OFFICE O/P EST MOD 30 MIN: CPT | Performed by: INTERNAL MEDICINE

## 2024-06-03 PROCEDURE — 82043 UR ALBUMIN QUANTITATIVE: CPT | Performed by: INTERNAL MEDICINE

## 2024-06-03 PROCEDURE — 80305 DRUG TEST PRSMV DIR OPT OBS: CPT | Performed by: INTERNAL MEDICINE

## 2024-06-03 PROCEDURE — 82570 ASSAY OF URINE CREATININE: CPT | Performed by: INTERNAL MEDICINE

## 2024-06-03 RX ORDER — FLUCONAZOLE 150 MG/1
150 TABLET ORAL
Qty: 5 TABLET | Refills: 0 | Status: SHIPPED | OUTPATIENT
Start: 2024-06-03 | End: 2024-06-16

## 2024-06-03 NOTE — PROGRESS NOTES
Chief Complaint  Hypertension (Follow up )    Subjective          Silvia Massey presents to [facility]  History of Present Illness  Hypertension- patient denies headaches, dizziness, chest pain.   DM2- patient reports HgbA1c approx 1 month ago was 6.2. patient is seeing ophtho in [place].   Hyperlipidemia- doing well on statin  Patient had follow-up with rheumatology for +RF and thought to have osteoarthritis. Patient takes gabapentin prn and it does allow patient to be more active.   Patient reports ongoing yeast infection.       Current Outpatient Medications   Medication Instructions    baclofen (LIORESAL) 10 MG tablet TAKE 1 TABLET BY MOUTH THREE TIMES DAILY    carvedilol (COREG) 6.25 mg, Oral, 2 Times Daily    dapagliflozin Propanediol 10 mg, Oral, Every Morning    Diclofenac Sodium (VOLTAREN) 4 g, Topical, 4 Times Daily PRN    EQ Aspirin Adult Low Dose 81 MG EC tablet Take 1 tablet by mouth once daily    fluconazole (DIFLUCAN) 150 mg, Oral, Every 3 Days    gabapentin (NEURONTIN) 600 mg, Oral, Nightly PRN    gabapentin (NEURONTIN) 300 mg, Oral, 2 Times Daily PRN    meloxicam (MOBIC) 15 mg, Oral, Daily    PARoxetine (PAXIL) 10 mg, Oral, Nightly    Prasterone (Intrarosa) 6.5 MG insert 1 Application, Vaginal, Nightly    rosuvastatin (CRESTOR) 10 MG tablet TAKE 1 TABLET BY MOUTH ONCE DAILY AT BEDTIME    spironolactone (ALDACTONE) 25 mg, Oral, Daily    Trulicity 4.5 MG/0.5ML solution pen-injector TAKES ON MONDAYS. LAST DOSE 2/5/24+    valsartan (DIOVAN) 160 mg, Oral, Daily    vitamin D (ERGOCALCIFEROL) 50,000 Units, Oral, Weekly       The following portions of the patient's history were reviewed and updated as appropriate: allergies, current medications, past family history, past medical history, past social history, past surgical history, and problem list.    Objective   Vital Signs:   /82 (BP Location: Left arm)   Pulse 88   Temp 96.3 °F (35.7 °C)  "(Temporal)   Ht 162.6 cm (64\")   Wt 79.5 kg (175 lb 3.2 oz)   SpO2 96%   BMI 30.07 kg/m²     BP Readings from Last 3 Encounters:   06/03/24 129/82   03/01/24 117/76   02/13/24 133/77     Wt Readings from Last 3 Encounters:   06/03/24 79.5 kg (175 lb 3.2 oz)   03/01/24 81.6 kg (180 lb)   02/09/24 81.6 kg (179 lb 14.3 oz)         Physical Exam   Appearance: No acute distress, well-nourished  Head: normocephalic, atraumatic  Eyes: extraocular movements intact, no scleral icterus, no conjunctival injection  Ears, Nose, and Throat: external ears normal, nares patent, moist mucous membranes  Cardiovascular: regular rate and rhythm. no murmurs, rubs, or gallops. no edema  Respiratory: breathing comfortably, symmetric chest rise, clear to auscultation bilaterally. No wheezes, rales, or rhonchi.  Neuro: alert and oriented to time, place, and person. Normal gait  Psych: normal mood and affect     Result Review :   The following data was reviewed by: Louie Jang Jr, MD on 06/03/2024:  Common labs          11/30/2023    08:54 2/12/2024    12:39 2/13/2024    09:43   Common Labs   Glucose 120  117  110    BUN 20  12  14    Creatinine 0.78  0.79  0.70    Sodium 141  141  140    Potassium 4.4  3.9  4.3    Chloride 104  104  104    Calcium 10.1  10.3  9.9    Albumin 4.8      Total Bilirubin 0.4      Alkaline Phosphatase 64      AST (SGOT) 22      ALT (SGPT) 22      WBC 5.70  5.56  5.10    Hemoglobin 13.8  13.9  13.8    Hematocrit 41.8  43.7  43.3    Platelets 261  228  211    Total Cholesterol 123      Triglycerides 72      HDL Cholesterol 69      LDL Cholesterol  39      Hemoglobin A1C 6.60          Lab Results   Component Value Date    INR 1.01 02/13/2024       Last Urine Toxicity  More data exists         Latest Ref Rng & Units 6/3/2024 5/26/2023   LAST URINE TOXICITY RESULTS   Amphetamine, Urine Qual Negative Negative  Negative    Barbiturates Screen, Urine Negative Negative  Negative    Benzodiazepine Screen, " Urine Negative Negative  Negative    Buprenorphine, Screen, Urine Negative Negative  Negative    Cocaine Screen, Urine Negative Negative  Negative    Methadone Screen , Urine Negative Negative  Negative    Methamphetamine, Ur Negative Negative  Negative        Assessment and Plan    Diagnoses and all orders for this visit:    1. Type 2 diabetes mellitus with hyperglycemia, without long-term current use of insulin (Primary)  Comments:  [facility]. encouraged pt to forward for my review  Orders:  -     Microalbumin / Creatinine Urine Ratio - Urine, Clean Catch  -     Hemoglobin A1c    2. Hyperlipidemia LDL goal <70  -     Lipid Panel    3. Benign essential HTN  Comments:  well controlled  Orders:  -     CBC & Differential  -     Comprehensive Metabolic Panel    4. Vitamin D deficiency  -     Vitamin D,25-Hydroxy    5. Encounter for long-term (current) use of other medications  -     POC Medline 12 Panel Urine Drug Screen    6. Nonischemic cardiomyopathy  Comments:  f/u with cardiology recent EF=30%. cont BB, ARB, prasterone  Overview:  Entresto possible allergic reaction with hives      7. Yeast infection  -     fluconazole (DIFLUCAN) 150 MG tablet; Take 1 tablet by mouth Every 3 (Three) Days for 5 doses.  Dispense: 5 tablet; Refill: 0    8. Need for COVID-19 vaccine  Comments:  pt defers today          Medications Discontinued During This Encounter   Medication Reason    triamcinolone (KENALOG) 0.1 % ointment *Therapy completed    cyclopentolate (CYCLOGYL) 1 % ophthalmic solution *Therapy completed          Follow Up   Return in about 6 months (around 12/3/2024).  Patient was given instructions and counseling regarding her condition or for health maintenance advice. Please see specific information pulled into the AVS if appropriate.       Louie Jang Jr, MD  06/03/24  08:56 EDT

## 2024-06-10 RX ORDER — HYDROGEN PEROXIDE 2.65 ML/100ML
LIQUID ORAL; TOPICAL
Qty: 30 TABLET | Refills: 0 | Status: SHIPPED | OUTPATIENT
Start: 2024-06-10

## 2024-06-20 RX ORDER — ROSUVASTATIN CALCIUM 10 MG/1
TABLET, COATED ORAL
Qty: 90 TABLET | Refills: 0 | Status: SHIPPED | OUTPATIENT
Start: 2024-06-20

## 2024-06-21 RX ORDER — SPIRONOLACTONE 25 MG/1
25 TABLET ORAL DAILY
Qty: 90 TABLET | Refills: 3 | Status: SHIPPED | OUTPATIENT
Start: 2024-06-21

## 2024-07-02 ENCOUNTER — OFFICE VISIT (OUTPATIENT)
Dept: OBSTETRICS AND GYNECOLOGY | Facility: CLINIC | Age: 64
End: 2024-07-02

## 2024-07-02 VITALS
WEIGHT: 179 LBS | DIASTOLIC BLOOD PRESSURE: 78 MMHG | BODY MASS INDEX: 30.56 KG/M2 | SYSTOLIC BLOOD PRESSURE: 135 MMHG | HEART RATE: 75 BPM | HEIGHT: 64 IN

## 2024-07-02 DIAGNOSIS — N39.41 URGE INCONTINENCE: Primary | ICD-10-CM

## 2024-07-02 LAB
BILIRUB UR QL STRIP: NEGATIVE
CLARITY UR: CLEAR
COLOR UR: YELLOW
GLUCOSE UR STRIP-MCNC: ABNORMAL MG/DL
HGB UR QL STRIP.AUTO: NEGATIVE
HOLD SPECIMEN: NORMAL
KETONES UR QL STRIP: NEGATIVE
LEUKOCYTE ESTERASE UR QL STRIP.AUTO: NEGATIVE
NITRITE UR QL STRIP: NEGATIVE
PH UR STRIP.AUTO: 5.5 [PH] (ref 5–8)
PROT UR QL STRIP: NEGATIVE
SP GR UR STRIP: >1.03 (ref 1–1.03)
UROBILINOGEN UR QL STRIP: ABNORMAL

## 2024-07-02 PROCEDURE — 81003 URINALYSIS AUTO W/O SCOPE: CPT | Performed by: STUDENT IN AN ORGANIZED HEALTH CARE EDUCATION/TRAINING PROGRAM

## 2024-07-02 PROCEDURE — 99212 OFFICE O/P EST SF 10 MIN: CPT | Performed by: STUDENT IN AN ORGANIZED HEALTH CARE EDUCATION/TRAINING PROGRAM

## 2024-07-02 RX ORDER — ERGOCALCIFEROL 1.25 MG/1
50000 CAPSULE ORAL WEEKLY
Qty: 13 CAPSULE | Refills: 0 | Status: SHIPPED | OUTPATIENT
Start: 2024-07-02

## 2024-07-02 RX ORDER — DULAGLUTIDE 4.5 MG/.5ML
INJECTION, SOLUTION SUBCUTANEOUS
Qty: 12 ML | Refills: 0 | Status: SHIPPED | OUTPATIENT
Start: 2024-07-02

## 2024-07-02 NOTE — PROGRESS NOTES
"GYN Problem Visit - Abnormal uterine bleeding    Chief Complaint   Patient presents with    Urinary Incontinence               HPI  Silvia NANCIE Massey is a 64 y.o. female, , who presents for urinary incontinence. Has been the last couple of months. Reports that it will be during the day and night. She reports that since she called into be evaluated that it has eased up some. No associated burning with urination. She denies any new medications over the past several months. She reports that it occurs while she is trying to get to the bathroom. She is using panty liners to assist. She reports that she goes to the bathroom 3-4 times a day. No bowel incontinence. She does lose urine with laugh cough or sneeze. Still taking spinolactone.   She does take trulicity for diabetes. Last HgbA1C was 6.2.      Additional OB/GYN History   No LMP recorded. Patient is postmenopausal.  Allergies : Metformin     The additional following portions of the patient's history were reviewed and updated as appropriate: allergies, current medications, past family history, past medical history, past social history, past surgical history, and problem list.    Review of Systems   Constitutional:  Negative for fatigue and fever.   Respiratory:  Negative for cough and shortness of breath.    Cardiovascular:  Negative for chest pain.   Gastrointestinal:  Negative for abdominal distention and abdominal pain.   Genitourinary:  Positive for urgency and urinary incontinence. Negative for difficulty urinating and dysuria.       I have reviewed and agree with the HPI, ROS, and historical information as entered above. Juma Em MD    Objective   /78   Pulse 75   Ht 162.6 cm (64\")   Wt 81.2 kg (179 lb)   Breastfeeding No   BMI 30.73 kg/m²     Physical Exam  Vitals and nursing note reviewed.   Constitutional:       General: She is not in acute distress.     Appearance: Normal appearance. She is not toxic-appearing.   HENT:      Head: " Normocephalic and atraumatic.   Eyes:      Extraocular Movements: Extraocular movements intact.      Conjunctiva/sclera: Conjunctivae normal.   Cardiovascular:      Pulses: Normal pulses.   Pulmonary:      Effort: Pulmonary effort is normal.   Abdominal:      General: There is no distension.      Palpations: Abdomen is soft.      Tenderness: There is no abdominal tenderness.   Genitourinary:     Comments: deferred  Musculoskeletal:      Cervical back: Normal range of motion.   Skin:     General: Skin is warm and dry.   Neurological:      Mental Status: She is alert and oriented to person, place, and time.   Psychiatric:         Mood and Affect: Mood normal.         Behavior: Behavior normal.         Thought Content: Thought content normal.            Assessment and Plan  Silvia Massey is a 64 y.o.  presenting for evaluation of new onset urge incontinence.  Discussed with patient risk factors including use of diuretic and history of diabetes.  Patient reports improvement over the past couple of months.  Urinalysis will be obtained to rule out urinary tract infection.  Patient was giving a voiding diary and information about bladder training.  Discussed with patient avoiding bladder irritants.  Patient to attempt bladder training over the next 12 weeks.  If no improvement recommendation for urodynamic studies with urogynecology.  Patient verbalized understanding.  Return as need be.    Diagnoses and all orders for this visit:    1. Urge incontinence (Primary)  -     Urinalysis With Culture If Indicated -        She understands the importance of having the above orders performed in a timely fashion.  The risks of not performing them include, but are not limited to, cancer and/or subsequent increase in morbidity and/or mortality.  She is encouraged to review her results online and/or contact or office if she has questions.     Follow Up:  Return if symptoms worsen or fail to improve.      Juma Em,  MD  07/02/2024

## 2024-07-05 RX ORDER — CARVEDILOL 6.25 MG/1
6.25 TABLET ORAL 2 TIMES DAILY
Qty: 180 TABLET | Refills: 0 | Status: SHIPPED | OUTPATIENT
Start: 2024-07-05

## 2024-07-15 RX ORDER — HYDROGEN PEROXIDE 2.65 ML/100ML
LIQUID ORAL; TOPICAL
Qty: 30 TABLET | Refills: 0 | Status: SHIPPED | OUTPATIENT
Start: 2024-07-15

## 2024-07-25 ENCOUNTER — OFFICE VISIT (OUTPATIENT)
Dept: CARDIOLOGY | Facility: CLINIC | Age: 64
End: 2024-07-25
Payer: COMMERCIAL

## 2024-07-25 VITALS
SYSTOLIC BLOOD PRESSURE: 108 MMHG | HEART RATE: 87 BPM | DIASTOLIC BLOOD PRESSURE: 55 MMHG | BODY MASS INDEX: 30.39 KG/M2 | HEIGHT: 64 IN | WEIGHT: 178 LBS

## 2024-07-25 DIAGNOSIS — E11.65 TYPE 2 DIABETES MELLITUS WITH HYPERGLYCEMIA, WITHOUT LONG-TERM CURRENT USE OF INSULIN: ICD-10-CM

## 2024-07-25 DIAGNOSIS — I25.10 NONOCCLUSIVE CORONARY ATHEROSCLEROSIS OF NATIVE CORONARY ARTERY: ICD-10-CM

## 2024-07-25 DIAGNOSIS — I10 BENIGN ESSENTIAL HTN: ICD-10-CM

## 2024-07-25 DIAGNOSIS — I44.7 LBBB (LEFT BUNDLE BRANCH BLOCK): ICD-10-CM

## 2024-07-25 DIAGNOSIS — E78.5 HYPERLIPIDEMIA LDL GOAL <70: ICD-10-CM

## 2024-07-25 DIAGNOSIS — I42.8 NICM (NONISCHEMIC CARDIOMYOPATHY): Primary | ICD-10-CM

## 2024-07-25 PROCEDURE — 99214 OFFICE O/P EST MOD 30 MIN: CPT | Performed by: INTERNAL MEDICINE

## 2024-07-25 NOTE — ASSESSMENT & PLAN NOTE
Patient with stable class II symptoms appears to be fairly optimized on GDMT given her current blood pressure she is doing well symptomatically and has not grossly volume overloaded or had any CHF exacerbations in the last year.  Recommended repeating her echocardiogram with Definity contrast if EF is below 30% would be a candidate for ICD.  Discussed with her the possibility of BiV ICD versus single-lead versus subcutaneous ICD given her borderline left bundle branch block and relatively mild symptoms at this point.

## 2024-07-25 NOTE — ASSESSMENT & PLAN NOTE
No significant CAD seen on heart catheterization recently chest pain symptoms suspect likely muscle skeletal reassured patient.  Continue with aspirin 81 can

## 2024-07-25 NOTE — PROGRESS NOTES
Chief Complaint  Follow-up, Coronary Artery Disease, Hypertension, and Hyperlipidemia    Subjective    Patient clinically remained stable with shortness of breath with exertion but not physically limiting.  She has not had any admissions for heart failure her weight is down about 2 pounds.  No problems with increased edema.  She persistent having brief left-sided discomfort symptoms nonexertional in nature  Past Medical History:   Diagnosis Date    Arthritis of back     CHF (congestive heart failure) 2018    CTS (carpal tunnel syndrome) 2012    Depression     Diabetes mellitus     type 2 DOES NOT CHECK BS DAILY    Forgetfulness     GERD (gastroesophageal reflux disease)     Greater trochanteric pain syndrome 08/01/2022    History of transfusion     Hyperlipidemia     Hypertension     Night sweats     Nonischemic cardiomyopathy 05/16/2022    Nonocclusive CAD 05/16/2022    Ovarian cyst     Periarthritis of shoulder 2010    Pneumonia     NO CURRENT ISSUES    Sleep apnea     Voice hoarseness          Current Outpatient Medications:     baclofen (LIORESAL) 10 MG tablet, TAKE 1 TABLET BY MOUTH THREE TIMES DAILY, Disp: 90 tablet, Rfl: 2    carvedilol (COREG) 6.25 MG tablet, Take 1 tablet by mouth twice daily, Disp: 180 tablet, Rfl: 0    dapagliflozin Propanediol 10 MG tablet, TAKE 1 TABLET BY MOUTH ONCE DAILY IN THE MORNING, Disp: 90 tablet, Rfl: 0    Diclofenac Sodium (VOLTAREN) 1 % gel gel, Apply 4 g topically to the appropriate area as directed 4 (Four) Times a Day As Needed (pain)., Disp: 350 g, Rfl: 1    EQ Aspirin Adult Low Dose 81 MG EC tablet, Take 1 tablet by mouth once daily, Disp: 30 tablet, Rfl: 0    gabapentin (NEURONTIN) 300 MG capsule, Take 1 capsule by mouth 2 (Two) Times a Day As Needed (pain)., Disp: 60 capsule, Rfl: 0    gabapentin (NEURONTIN) 600 MG tablet, Take 1 tablet by mouth At Night As Needed (pain)., Disp: 30 tablet, Rfl: 0    meloxicam (MOBIC) 15 MG tablet, Take 1 tablet by mouth Daily., Disp:  "30 tablet, Rfl: 2    PARoxetine (Paxil) 10 MG tablet, Take 1 tablet by mouth Every Night., Disp: 30 tablet, Rfl: 11    Prasterone (Intrarosa) 6.5 MG insert, Insert 1 Application into the vagina Every Night., Disp: 30 each, Rfl: 11    rosuvastatin (CRESTOR) 10 MG tablet, TAKE 1 TABLET BY MOUTH ONCE DAILY AT BEDTIME, Disp: 90 tablet, Rfl: 0    spironolactone (ALDACTONE) 25 MG tablet, Take 1 tablet by mouth once daily, Disp: 90 tablet, Rfl: 3    Trulicity 4.5 MG/0.5ML solution pen-injector, INJECT 1/2 (ONE-HALF) ML  SUBCUTANEOUSLY ONCE A WEEK, Disp: 12 mL, Rfl: 0    valsartan (DIOVAN) 160 MG tablet, Take 1 tablet by mouth Daily., Disp: 90 tablet, Rfl: 3    vitamin D (ERGOCALCIFEROL) 1.25 MG (91282 UT) capsule capsule, Take 1 capsule by mouth once a week, Disp: 13 capsule, Rfl: 0    There are no discontinued medications.  Allergies   Allergen Reactions    Metformin Diarrhea        Social History     Tobacco Use    Smoking status: Former     Current packs/day: 0.00     Average packs/day: 0.5 packs/day for 10.5 years (5.3 ttl pk-yrs)     Types: Cigarettes     Start date: 1977     Quit date: 10/11/1987     Years since quittin.8     Passive exposure: Past    Smokeless tobacco: Former    Tobacco comments:     25 years ago    Vaping Use    Vaping status: Never Used   Substance Use Topics    Alcohol use: Yes     Alcohol/week: 2.0 standard drinks of alcohol     Types: 2 Glasses of wine per week     Comment: occasionally drinks, 1 drink per day, has been drinking for 6-10 yrs,1 beer per night     Drug use: No       Family History   Problem Relation Age of Onset    Heart disease Father     Heart failure Father     Hypertension Father     Uterine cancer Mother     Cancer Mother     Diabetes Brother     Heart disease Brother     Diabetes Other     Diabetes Other     Breast cancer Neg Hx     Ovarian cancer Neg Hx     Colon cancer Neg Hx         Objective     /55   Pulse 87   Ht 162.6 cm (64\")   Wt 80.7 kg (178 lb) " "  BMI 30.55 kg/m²       Physical Exam    General Appearance:   no acute distress  Alert and oriented x3  HENT:   lips not cyanotic  Atraumatic  Neck:  No jvd   supple  Respiratory:  no respiratory distress  normal breath sounds  no rales  Cardiovascular:  Regular rate and rhythm  no S3, no S4   no murmur  no rub  Extremities  No cyanosis  lower extremity edema: Mild   Skin:   warm, dry  No rashes      Result Review :     proBNP   Date Value Ref Range Status   05/25/2023 42.5 0.0 - 900.0 pg/mL Final     CMP          11/30/2023    08:54 2/12/2024    12:39 2/13/2024    09:43   CMP   Glucose 120  117  110    BUN 20  12  14    Creatinine 0.78  0.79  0.70    EGFR 85.5  84.2  97.3    Sodium 141  141  140    Potassium 4.4  3.9  4.3    Chloride 104  104  104    Calcium 10.1  10.3  9.9    Total Protein 7.4      Albumin 4.8      Globulin 2.6      Total Bilirubin 0.4      Alkaline Phosphatase 64      AST (SGOT) 22      ALT (SGPT) 22      Albumin/Globulin Ratio 1.8      BUN/Creatinine Ratio 25.6  15.2  20.0    Anion Gap 12.0  10.4  10.9      CBC w/diff          11/30/2023    08:54 2/12/2024    12:39 2/13/2024    09:43   CBC w/Diff   WBC 5.70  5.56  5.10    RBC 4.97  5.23  5.15    Hemoglobin 13.8  13.9  13.8    Hematocrit 41.8  43.7  43.3    MCV 84.1  83.6  84.1    MCH 27.8  26.6  26.8    MCHC 33.0  31.8  31.9    RDW 13.1  13.4  13.4    Platelets 261  228  211    Neutrophil Rel % 53.0      Immature Granulocyte Rel % 0.2      Lymphocyte Rel % 36.8      Monocyte Rel % 6.3      Eosinophil Rel % 2.3      Basophil Rel % 1.4         Lab Results   Component Value Date    TSH 1.170 10/23/2019      No results found for: \"FREET4\"   No results found for: \"DDIMERQUANT\"  No results found for: \"MG\"   No results found for: \"DIGOXIN\"   No results found for: \"TROPONINT\"        Lipid Panel          11/30/2023    08:54   Lipid Panel   Total Cholesterol 123    Triglycerides 72    HDL Cholesterol 69    VLDL Cholesterol 15    LDL Cholesterol  39  " "  LDL/HDL Ratio 0.57      No results found for: \"POCTROP\"    Results for orders placed during the hospital encounter of 02/06/24    Adult Transthoracic Echo Complete W/ Cont if Necessary Per Protocol    Interpretation Summary    Left ventricular systolic function is moderately decreased. Estimated left ventricular EF = 30%    The left ventricular cavity is borderline dilated.    Left ventricular diastolic dysfunction is noted.    The left atrial cavity is moderately dilated.                 Diagnoses and all orders for this visit:    1. NICM (nonischemic cardiomyopathy) (Primary)  Assessment & Plan:  Patient with stable class II symptoms appears to be fairly optimized on GDMT given her current blood pressure she is doing well symptomatically and has not grossly volume overloaded or had any CHF exacerbations in the last year.  Recommended repeating her echocardiogram with Definity contrast if EF is below 30% would be a candidate for ICD.  Discussed with her the possibility of BiV ICD versus single-lead versus subcutaneous ICD given her borderline left bundle branch block and relatively mild symptoms at this point.          Orders:  -     Adult Transthoracic Echo Complete W/ Cont if Necessary Per Protocol; Future    2. Nonocclusive CAD  Assessment & Plan:  No significant CAD seen on heart catheterization recently chest pain symptoms suspect likely muscle skeletal reassured patient.  Continue with aspirin 81 can      3. Benign essential HTN  Assessment & Plan:  At goal range continue with current meds       4. LBBB (left bundle branch block)    5. Hyperlipidemia LDL goal <70    6. Type 2 diabetes mellitus with hyperglycemia, without long-term current use of insulin            Follow Up     Return in about 6 months (around 1/25/2025) for Follow with Cherelle Zavala.          Patient was given instructions and counseling regarding her condition or for health maintenance advice. Please see specific information pulled into " the AVS if appropriate.

## 2024-07-29 ENCOUNTER — TELEPHONE (OUTPATIENT)
Dept: INTERNAL MEDICINE | Facility: CLINIC | Age: 64
End: 2024-07-29

## 2024-07-29 RX ORDER — DULAGLUTIDE 4.5 MG/.5ML
4.5 INJECTION, SOLUTION SUBCUTANEOUS WEEKLY
Qty: 6 ML | Refills: 3 | Status: SHIPPED | OUTPATIENT
Start: 2024-07-29

## 2024-07-29 NOTE — TELEPHONE ENCOUNTER
I've sent in 90 day supply script. Pharmacy may reduce it to 30 day supply, however due to supply shortages.

## 2024-07-29 NOTE — TELEPHONE ENCOUNTER
Caller: Bryson Silvia NANCIE    Relationship: Self    Best call back number: [phone]    Requested Prescriptions:   Requested Prescriptions     Pending Prescriptions Disp Refills    Dulaglutide (Trulicity) 4.5 MG/0.5ML solution pen-injector 12 mL 0        Pharmacy where request should be sent: [facility] [address] [place] [address] [place] [address] [facility] [phone] [facility] [phone] FX     Last office visit with prescribing clinician: 6/3/2024   Last telemedicine visit with prescribing clinician: Visit date not found   Next office visit with prescribing clinician: 12/3/2024     Additional details provided by patient: PATIENT NEEDS TO PICK THIS UP TODAY. PLEASE SEND NEW PRESCRIPTION FOR 90 DAY SUPPLY OF THIS. IT IS MUCH CHEAPER THIS WAY. PLEASE SEND NEW PRESCRIPTION TO PHARMACY ASAP TODAY. THEY WILL NOT LET HER GET 90 DAY SUPPLY ON CURRENT REFILL. PATIENT WOULD LIKE TO DISCUSS POSSIBILITY OF [facility]. PLEASE CALL PATIENT BACK AND ADVISE.    Does the patient have less than a 3 day supply:  [x] Yes  [] No    Would you like a call back once the refill request has been completed: [] Yes [] No    If the office needs to give you a call back, can they leave a voicemail: [] Yes [] No    Joceline Marrero Rep   07/29/24 10:48 EDT

## 2024-08-09 ENCOUNTER — TELEPHONE (OUTPATIENT)
Dept: INTERNAL MEDICINE | Facility: CLINIC | Age: 64
End: 2024-08-09
Payer: COMMERCIAL

## 2024-08-09 NOTE — TELEPHONE ENCOUNTER
Caller: Silvia Massey    Relationship: Self    Best call back number: [phone]     What form or medical record are you requesting: HANDICAP PLACARD     Who is requesting this form or medical record from you: CALLER     How would you like to receive the form or medical records (pick-up, mail, fax):       Timeframe paperwork needed:   AS SOON AS POSSIBLE   CURRENT ONE  TODAY     Additional notes: CALLER STATED THAT THE LAST ONE PROVIDED WAS FOR 90 DAYS AND NEEDING TO HAVE ANOTHER THAT IS FOR LONGER TIME PERIOD

## 2024-08-28 NOTE — PROGRESS NOTES
Chief Complaint  paperwork  (parking pass) and Bite (Insect bites /)    Subjective          Silvia Massey presents to [facility]  History of Present Illness  Retention-patient denies headaches, dizziness, chest pain.  Diabetes-patient has had difficulty losing weight on Trulicity.  Patient previously on Ozempic and Mounjaro but experienced side effects.  Patient will be continued on same medication.  Hyperlipidemia doing well on statin and aspirin.  Patient with concerns regarding a yeast infection.  She request Diflucan.  Patient reports having bug bites intermittently.  They are itchy.  Patient reports the pruritus keeps her from sleeping at night.  Patient previously had steroid cream that helped.      Current Outpatient Medications   Medication Instructions    baclofen (LIORESAL) 10 MG tablet TAKE 1 TABLET BY MOUTH THREE TIMES DAILY    carvedilol (COREG) 6.25 mg, Oral, 2 Times Daily    dapagliflozin Propanediol 10 mg, Oral, Every Morning    Diclofenac Sodium (VOLTAREN) 4 g, Topical, 4 Times Daily PRN    EQ Aspirin Adult Low Dose 81 MG EC tablet Take 1 tablet by mouth once daily    fluconazole (DIFLUCAN) 150 mg, Oral, Every 3 Days    gabapentin (NEURONTIN) 600 mg, Oral, Nightly PRN    gabapentin (NEURONTIN) 300 mg, Oral, 2 Times Daily PRN    meloxicam (MOBIC) 15 mg, Oral, Daily    PARoxetine (PAXIL) 10 mg, Oral, Nightly    Prasterone (Intrarosa) 6.5 MG insert 1 Application, Vaginal, Nightly    rosuvastatin (CRESTOR) 10 MG tablet TAKE 1 TABLET BY MOUTH ONCE DAILY AT BEDTIME    spironolactone (ALDACTONE) 25 mg, Oral, Daily    triamcinolone (KENALOG) 0.1 % ointment 1 Application, Topical, 2 Times Daily    Trulicity 4.5 mg, Subcutaneous, Weekly    valsartan (DIOVAN) 160 mg, Oral, Daily    vitamin D (ERGOCALCIFEROL) 50,000 Units, Oral, Weekly       The following portions of the patient's history were reviewed and updated as appropriate: allergies, current  "medications, past family history, past medical history, past social history, past surgical history, and problem list.    Objective   Vital Signs:   /79 (BP Location: Left arm)   Pulse 90   Temp 97.5 °F (36.4 °C) (Temporal)   Ht 162.6 cm (64\")   Wt 82.1 kg (181 lb)   SpO2 93%   BMI 31.07 kg/m²     BP Readings from Last 3 Encounters:   08/30/24 123/79   07/25/24 108/55   07/02/24 135/78     Wt Readings from Last 3 Encounters:   08/30/24 82.1 kg (181 lb)   07/25/24 80.7 kg (178 lb)   07/02/24 81.2 kg (179 lb)     SpO2 Readings from Last 3 Encounters:   08/30/24 93%   06/03/24 96%   03/01/24 96%       Physical Exam   Appearance: No acute distress, well-nourished  Head: normocephalic, atraumatic  Eyes: extraocular movements intact, no scleral icterus, no conjunctival injection  Ears, Nose, and Throat: external ears normal, nares patent, moist mucous membranes  Cardiovascular: regular rate and rhythm. no murmurs, rubs, or gallops. no edema  Respiratory: breathing comfortably, symmetric chest rise, clear to auscultation bilaterally. No wheezes, rales, or rhonchi.  Neuro: alert and oriented to time, place, and person. Normal gait  Psych: normal mood and affect     Result Review :   The following data was reviewed by: Louie Jang Jr, MD on 08/30/2024:  Common labs          2/12/2024    12:39 2/13/2024    09:43 6/3/2024    08:21   Common Labs   Glucose 117  110     BUN 12  14     Creatinine 0.79  0.70     Sodium 141  140     Potassium 3.9  4.3     Chloride 104  104     Calcium 10.3  9.9     WBC 5.56  5.10     Hemoglobin 13.9  13.8     Hematocrit 43.7  43.3     Platelets 228  211     Microalbumin, Urine   2.6        Lab Results   Component Value Date    INR 1.01 02/13/2024    BILIRUBINUR Negative 07/02/2024          Assessment and Plan    Diagnoses and all orders for this visit:    1. Type 2 diabetes mellitus with hyperglycemia, without long-term current use of insulin (Primary)  Comments:  check labs. " cont trulicity. discussed ozempic and mounjaro but previously had side effects.  Orders:  -     Hemoglobin A1c    2. Benign essential HTN  Comments:  well controlled on regimen.  Orders:  -     CBC & Differential  -     Comprehensive Metabolic Panel    3. DDD (degenerative disc disease), lumbar  Comments:  completed disabled parking pass while at clinic.    4. Hyperlipidemia LDL goal <70  Comments:  cont statin. check labs.  Orders:  -     Lipid Panel    5. Nonocclusive CAD  Comments:  cont BB, statin, ASA.  Orders:  -     carvedilol (COREG) 6.25 MG tablet; Take 1 tablet by mouth 2 (Two) Times a Day.  Dispense: 180 tablet; Refill: 3    6. Nonischemic cardiomyopathy  Comments:  cont BB, ARB, MRA, and SGLT-2  Overview:  Entresto possible allergic reaction with hives    Orders:  -     spironolactone (ALDACTONE) 25 MG tablet; Take 1 tablet by mouth Daily.  Dispense: 90 tablet; Refill: 3  -     proBNP    7. Pruritus  Comments:  will Rx triamcinolone  Orders:  -     triamcinolone (KENALOG) 0.1 % ointment; Apply 1 Application topically to the appropriate area as directed 2 (Two) Times a Day.  Dispense: 454 g; Refill: 0    8. Need for influenza vaccination    9. Need for COVID-19 vaccine    10. Yeast infection  -     fluconazole (Diflucan) 150 MG tablet; Take 1 tablet by mouth Every 3 (Three) Days.  Dispense: 6 tablet; Refill: 0        Medications Discontinued During This Encounter   Medication Reason    spironolactone (ALDACTONE) 25 MG tablet Reorder    carvedilol (COREG) 6.25 MG tablet Reorder        Follow Up   Return in about 4 months (around 12/30/2024).  Patient was given instructions and counseling regarding her condition or for health maintenance advice. Please see specific information pulled into the AVS if appropriate.       Louie Jang Jr, MD  08/30/24  10:25 EDT

## 2024-08-30 ENCOUNTER — OFFICE VISIT (OUTPATIENT)
Dept: INTERNAL MEDICINE | Facility: CLINIC | Age: 64
End: 2024-08-30
Payer: COMMERCIAL

## 2024-08-30 VITALS
OXYGEN SATURATION: 93 % | BODY MASS INDEX: 30.9 KG/M2 | WEIGHT: 181 LBS | HEIGHT: 64 IN | SYSTOLIC BLOOD PRESSURE: 123 MMHG | HEART RATE: 90 BPM | DIASTOLIC BLOOD PRESSURE: 79 MMHG | TEMPERATURE: 97.5 F

## 2024-08-30 DIAGNOSIS — E11.65 TYPE 2 DIABETES MELLITUS WITH HYPERGLYCEMIA, WITHOUT LONG-TERM CURRENT USE OF INSULIN: Primary | ICD-10-CM

## 2024-08-30 DIAGNOSIS — Z23 NEED FOR COVID-19 VACCINE: ICD-10-CM

## 2024-08-30 DIAGNOSIS — I25.10 NONOCCLUSIVE CORONARY ATHEROSCLEROSIS OF NATIVE CORONARY ARTERY: ICD-10-CM

## 2024-08-30 DIAGNOSIS — M51.36 DDD (DEGENERATIVE DISC DISEASE), LUMBAR: ICD-10-CM

## 2024-08-30 DIAGNOSIS — L29.9 PRURITUS: ICD-10-CM

## 2024-08-30 DIAGNOSIS — I42.8 NICM (NONISCHEMIC CARDIOMYOPATHY): ICD-10-CM

## 2024-08-30 DIAGNOSIS — E78.5 HYPERLIPIDEMIA LDL GOAL <70: ICD-10-CM

## 2024-08-30 DIAGNOSIS — I10 BENIGN ESSENTIAL HTN: ICD-10-CM

## 2024-08-30 DIAGNOSIS — Z23 NEED FOR INFLUENZA VACCINATION: ICD-10-CM

## 2024-08-30 DIAGNOSIS — B37.9 YEAST INFECTION: ICD-10-CM

## 2024-08-30 PROCEDURE — 99214 OFFICE O/P EST MOD 30 MIN: CPT | Performed by: INTERNAL MEDICINE

## 2024-08-30 PROCEDURE — 83036 HEMOGLOBIN GLYCOSYLATED A1C: CPT | Performed by: INTERNAL MEDICINE

## 2024-08-30 PROCEDURE — 80061 LIPID PANEL: CPT | Performed by: INTERNAL MEDICINE

## 2024-08-30 PROCEDURE — 85025 COMPLETE CBC W/AUTO DIFF WBC: CPT | Performed by: INTERNAL MEDICINE

## 2024-08-30 PROCEDURE — 83880 ASSAY OF NATRIURETIC PEPTIDE: CPT | Performed by: INTERNAL MEDICINE

## 2024-08-30 PROCEDURE — 80053 COMPREHEN METABOLIC PANEL: CPT | Performed by: INTERNAL MEDICINE

## 2024-08-30 RX ORDER — TRIAMCINOLONE ACETONIDE 1 MG/G
1 OINTMENT TOPICAL 2 TIMES DAILY
Qty: 454 G | Refills: 0 | Status: SHIPPED | OUTPATIENT
Start: 2024-08-30

## 2024-08-30 RX ORDER — SPIRONOLACTONE 25 MG/1
25 TABLET ORAL DAILY
Qty: 90 TABLET | Refills: 3 | Status: SHIPPED | OUTPATIENT
Start: 2024-08-30

## 2024-08-30 RX ORDER — FLUCONAZOLE 150 MG/1
150 TABLET ORAL
Qty: 6 TABLET | Refills: 0 | Status: SHIPPED | OUTPATIENT
Start: 2024-08-30

## 2024-08-30 RX ORDER — DAPAGLIFLOZIN 10 MG/1
1 TABLET, FILM COATED ORAL EVERY MORNING
Qty: 90 TABLET | Refills: 0 | Status: SHIPPED | OUTPATIENT
Start: 2024-08-30

## 2024-08-30 RX ORDER — HYDROGEN PEROXIDE 2.65 ML/100ML
LIQUID ORAL; TOPICAL
Qty: 30 TABLET | Refills: 0 | Status: SHIPPED | OUTPATIENT
Start: 2024-08-30

## 2024-08-30 RX ORDER — CARVEDILOL 6.25 MG/1
6.25 TABLET ORAL 2 TIMES DAILY
Qty: 180 TABLET | Refills: 3 | Status: SHIPPED | OUTPATIENT
Start: 2024-08-30

## 2024-08-31 LAB
ALBUMIN SERPL-MCNC: 4.8 G/DL (ref 3.5–5.2)
ALBUMIN/GLOB SERPL: 1.8 G/DL
ALP SERPL-CCNC: 80 U/L (ref 39–117)
ALT SERPL W P-5'-P-CCNC: 21 U/L (ref 1–33)
ANION GAP SERPL CALCULATED.3IONS-SCNC: 12 MMOL/L (ref 5–15)
AST SERPL-CCNC: 20 U/L (ref 1–32)
BASOPHILS # BLD AUTO: 0.11 10*3/MM3 (ref 0–0.2)
BASOPHILS NFR BLD AUTO: 2.1 % (ref 0–1.5)
BILIRUB SERPL-MCNC: 0.3 MG/DL (ref 0–1.2)
BUN SERPL-MCNC: 17 MG/DL (ref 8–23)
BUN/CREAT SERPL: 19.8 (ref 7–25)
CALCIUM SPEC-SCNC: 10.3 MG/DL (ref 8.6–10.5)
CHLORIDE SERPL-SCNC: 104 MMOL/L (ref 98–107)
CHOLEST SERPL-MCNC: 145 MG/DL (ref 0–200)
CO2 SERPL-SCNC: 25 MMOL/L (ref 22–29)
CREAT SERPL-MCNC: 0.86 MG/DL (ref 0.57–1)
DEPRECATED RDW RBC AUTO: 40.3 FL (ref 37–54)
EGFRCR SERPLBLD CKD-EPI 2021: 75.5 ML/MIN/1.73
EOSINOPHIL # BLD AUTO: 0.16 10*3/MM3 (ref 0–0.4)
EOSINOPHIL NFR BLD AUTO: 3 % (ref 0.3–6.2)
ERYTHROCYTE [DISTWIDTH] IN BLOOD BY AUTOMATED COUNT: 13.1 % (ref 12.3–15.4)
GLOBULIN UR ELPH-MCNC: 2.7 GM/DL
GLUCOSE SERPL-MCNC: 110 MG/DL (ref 65–99)
HBA1C MFR BLD: 6.3 % (ref 4.8–5.6)
HCT VFR BLD AUTO: 46.5 % (ref 34–46.6)
HDLC SERPL-MCNC: 64 MG/DL (ref 40–60)
HGB BLD-MCNC: 14.7 G/DL (ref 12–15.9)
IMM GRANULOCYTES # BLD AUTO: 0.01 10*3/MM3 (ref 0–0.05)
IMM GRANULOCYTES NFR BLD AUTO: 0.2 % (ref 0–0.5)
LDLC SERPL CALC-MCNC: 58 MG/DL (ref 0–100)
LDLC/HDLC SERPL: 0.85 {RATIO}
LYMPHOCYTES # BLD AUTO: 1.92 10*3/MM3 (ref 0.7–3.1)
LYMPHOCYTES NFR BLD AUTO: 36.6 % (ref 19.6–45.3)
MCH RBC QN AUTO: 27.2 PG (ref 26.6–33)
MCHC RBC AUTO-ENTMCNC: 31.6 G/DL (ref 31.5–35.7)
MCV RBC AUTO: 86.1 FL (ref 79–97)
MONOCYTES # BLD AUTO: 0.5 10*3/MM3 (ref 0.1–0.9)
MONOCYTES NFR BLD AUTO: 9.5 % (ref 5–12)
NEUTROPHILS NFR BLD AUTO: 2.55 10*3/MM3 (ref 1.7–7)
NEUTROPHILS NFR BLD AUTO: 48.6 % (ref 42.7–76)
NRBC BLD AUTO-RTO: 0 /100 WBC (ref 0–0.2)
NT-PROBNP SERPL-MCNC: 108 PG/ML (ref 0–900)
PLATELET # BLD AUTO: 229 10*3/MM3 (ref 140–450)
PMV BLD AUTO: 10.6 FL (ref 6–12)
POTASSIUM SERPL-SCNC: 4.3 MMOL/L (ref 3.5–5.2)
PROT SERPL-MCNC: 7.5 G/DL (ref 6–8.5)
RBC # BLD AUTO: 5.4 10*6/MM3 (ref 3.77–5.28)
SODIUM SERPL-SCNC: 141 MMOL/L (ref 136–145)
TRIGL SERPL-MCNC: 133 MG/DL (ref 0–150)
VLDLC SERPL-MCNC: 23 MG/DL (ref 5–40)
WBC NRBC COR # BLD AUTO: 5.25 10*3/MM3 (ref 3.4–10.8)

## 2024-09-03 RX ORDER — VALSARTAN 160 MG/1
160 TABLET ORAL DAILY
Qty: 90 TABLET | Refills: 3 | Status: SHIPPED | OUTPATIENT
Start: 2024-09-03

## 2024-09-03 NOTE — TELEPHONE ENCOUNTER
Medication list shows patient is currently taking valsartan 160mg. Please advise if this needs to be filled.

## 2024-09-16 RX ORDER — ERGOCALCIFEROL 1.25 MG/1
50000 CAPSULE, LIQUID FILLED ORAL WEEKLY
Qty: 12 CAPSULE | Refills: 0 | Status: SHIPPED | OUTPATIENT
Start: 2024-09-16

## 2024-09-16 RX ORDER — ROSUVASTATIN CALCIUM 10 MG/1
TABLET, COATED ORAL
Qty: 90 TABLET | Refills: 0 | Status: SHIPPED | OUTPATIENT
Start: 2024-09-16

## 2024-09-16 RX ORDER — HYDROGEN PEROXIDE 2.65 ML/100ML
LIQUID ORAL; TOPICAL
Qty: 30 TABLET | Refills: 0 | Status: SHIPPED | OUTPATIENT
Start: 2024-09-16

## 2024-09-19 ENCOUNTER — HOSPITAL ENCOUNTER (OUTPATIENT)
Dept: CARDIOLOGY | Facility: HOSPITAL | Age: 64
Discharge: HOME | End: 2024-09-19
Admitting: INTERNAL MEDICINE
Payer: COMMERCIAL

## 2024-09-19 DIAGNOSIS — I42.8 NICM (NONISCHEMIC CARDIOMYOPATHY): ICD-10-CM

## 2024-09-19 PROCEDURE — 93306 TTE W/DOPPLER COMPLETE: CPT

## 2024-09-23 LAB
AORTIC DIMENSIONLESS INDEX: 0.81 (DI)
ASCENDING AORTA: 3 CM
BH CV ECHO MEAS - ACS: 1.61 CM
BH CV ECHO MEAS - AO MAX PG: 5.9 MMHG
BH CV ECHO MEAS - AO MEAN PG: 3.4 MMHG
BH CV ECHO MEAS - AO ROOT DIAM: 2.9 CM
BH CV ECHO MEAS - AO V2 MAX: 121.5 CM/SEC
BH CV ECHO MEAS - AO V2 VTI: 23 CM
BH CV ECHO MEAS - AVA(I,D): 2.5 CM2
BH CV ECHO MEAS - EDV(CUBED): 89.7 ML
BH CV ECHO MEAS - EDV(MOD-SP2): 93.6 ML
BH CV ECHO MEAS - EDV(MOD-SP4): 94.5 ML
BH CV ECHO MEAS - EF(MOD-BP): 30.2 %
BH CV ECHO MEAS - EF(MOD-SP2): 35.6 %
BH CV ECHO MEAS - EF(MOD-SP4): 30.2 %
BH CV ECHO MEAS - ESV(CUBED): 55.4 ML
BH CV ECHO MEAS - ESV(MOD-SP2): 60.3 ML
BH CV ECHO MEAS - ESV(MOD-SP4): 66 ML
BH CV ECHO MEAS - FS: 14.8 %
BH CV ECHO MEAS - IVS/LVPW: 0.95 CM
BH CV ECHO MEAS - IVSD: 1.03 CM
BH CV ECHO MEAS - LA DIMENSION: 4 CM
BH CV ECHO MEAS - LAT PEAK E' VEL: 5.7 CM/SEC
BH CV ECHO MEAS - LV MASS(C)D: 164.4 GRAMS
BH CV ECHO MEAS - LV MAX PG: 2.5 MMHG
BH CV ECHO MEAS - LV MEAN PG: 1.04 MMHG
BH CV ECHO MEAS - LV V1 MAX: 79.8 CM/SEC
BH CV ECHO MEAS - LV V1 VTI: 18.7 CM
BH CV ECHO MEAS - LVIDD: 4.5 CM
BH CV ECHO MEAS - LVIDS: 3.8 CM
BH CV ECHO MEAS - LVOT AREA: 3.1 CM2
BH CV ECHO MEAS - LVOT DIAM: 1.99 CM
BH CV ECHO MEAS - LVPWD: 1.09 CM
BH CV ECHO MEAS - MED PEAK E' VEL: 4 CM/SEC
BH CV ECHO MEAS - MR MAX PG: 88 MMHG
BH CV ECHO MEAS - MR MAX VEL: 469.1 CM/SEC
BH CV ECHO MEAS - MV A MAX VEL: 135.3 CM/SEC
BH CV ECHO MEAS - MV DEC SLOPE: 752.1 CM/SEC2
BH CV ECHO MEAS - MV DEC TIME: 0.14 SEC
BH CV ECHO MEAS - MV E MAX VEL: 105 CM/SEC
BH CV ECHO MEAS - MV E/A: 0.78
BH CV ECHO MEAS - MV MAX PG: 8.2 MMHG
BH CV ECHO MEAS - MV MEAN PG: 3.5 MMHG
BH CV ECHO MEAS - MV P1/2T: 52.1 MSEC
BH CV ECHO MEAS - MV V2 VTI: 33 CM
BH CV ECHO MEAS - MVA(P1/2T): 4.2 CM2
BH CV ECHO MEAS - MVA(VTI): 1.75 CM2
BH CV ECHO MEAS - PA V2 MAX: 70.6 CM/SEC
BH CV ECHO MEAS - PULM A REVS DUR: 0.12 SEC
BH CV ECHO MEAS - PULM A REVS VEL: 21.3 CM/SEC
BH CV ECHO MEAS - PULM DIAS VEL: 38.8 CM/SEC
BH CV ECHO MEAS - PULM S/D: 1.39
BH CV ECHO MEAS - PULM SYS VEL: 53.8 CM/SEC
BH CV ECHO MEAS - QP/QS: 0.57
BH CV ECHO MEAS - RV MAX PG: 1.15 MMHG
BH CV ECHO MEAS - RV V1 MAX: 53.6 CM/SEC
BH CV ECHO MEAS - RV V1 VTI: 11.1 CM
BH CV ECHO MEAS - RVDD: 3.1 CM
BH CV ECHO MEAS - RVOT DIAM: 1.94 CM
BH CV ECHO MEAS - SV(LVOT): 57.8 ML
BH CV ECHO MEAS - SV(MOD-SP2): 33.3 ML
BH CV ECHO MEAS - SV(MOD-SP4): 28.5 ML
BH CV ECHO MEAS - SV(RVOT): 33 ML
BH CV ECHO MEAS - TAPSE (>1.6): 1.94 CM
BH CV ECHO MEASUREMENTS AVERAGE E/E' RATIO: 21.65
BH CV XLRA - TDI S': 11.4 CM/SEC
IVRT: 69 MS
LEFT ATRIUM VOLUME INDEX: 40.2 ML/M2

## 2024-09-24 ENCOUNTER — TELEPHONE (OUTPATIENT)
Dept: CARDIOLOGY | Facility: CLINIC | Age: 64
End: 2024-09-24
Payer: COMMERCIAL

## 2024-10-23 ENCOUNTER — CLINICAL SUPPORT NO REQUIREMENTS (OUTPATIENT)
Dept: CARDIOLOGY | Facility: CLINIC | Age: 64
End: 2024-10-23
Payer: COMMERCIAL

## 2024-10-23 ENCOUNTER — OFFICE VISIT (OUTPATIENT)
Dept: CARDIOLOGY | Facility: CLINIC | Age: 64
End: 2024-10-23
Payer: COMMERCIAL

## 2024-10-23 VITALS
WEIGHT: 179 LBS | SYSTOLIC BLOOD PRESSURE: 104 MMHG | BODY MASS INDEX: 30.56 KG/M2 | HEART RATE: 88 BPM | DIASTOLIC BLOOD PRESSURE: 67 MMHG | HEIGHT: 64 IN

## 2024-10-23 DIAGNOSIS — E78.5 HYPERLIPIDEMIA LDL GOAL <70: ICD-10-CM

## 2024-10-23 DIAGNOSIS — I42.8 NICM (NONISCHEMIC CARDIOMYOPATHY): Primary | ICD-10-CM

## 2024-10-23 DIAGNOSIS — I25.10 NONOCCLUSIVE CORONARY ATHEROSCLEROSIS OF NATIVE CORONARY ARTERY: ICD-10-CM

## 2024-10-23 DIAGNOSIS — I10 BENIGN ESSENTIAL HTN: ICD-10-CM

## 2024-10-23 DIAGNOSIS — I44.7 LBBB (LEFT BUNDLE BRANCH BLOCK): ICD-10-CM

## 2024-10-23 PROCEDURE — 99214 OFFICE O/P EST MOD 30 MIN: CPT | Performed by: INTERNAL MEDICINE

## 2024-10-23 RX ORDER — VALSARTAN 320 MG/1
320 TABLET ORAL DAILY
Qty: 30 TABLET | Refills: 11 | Status: SHIPPED | OUTPATIENT
Start: 2024-10-23

## 2024-10-23 RX ORDER — DULOXETIN HYDROCHLORIDE 20 MG/1
20 CAPSULE, DELAYED RELEASE ORAL DAILY
COMMUNITY

## 2024-10-23 NOTE — PROGRESS NOTES
Chief Complaint  Follow-up, Coronary Artery Disease, and Cardiomyopathy    Subjective    Patient today reports stable dyspnea on exertion issues her weight is unchanged no increase edema problems.  Past Medical History:   Diagnosis Date    Arthritis of back     Cataract Mar 2023    CHF (congestive heart failure) 2018    CTS (carpal tunnel syndrome) 2012    Depression     Diabetes mellitus     type 2 DOES NOT CHECK BS DAILY    Forgetfulness     GERD (gastroesophageal reflux disease)     Greater trochanteric pain syndrome 08/01/2022    History of transfusion     Hyperlipidemia     Hypertension     Night sweats     Nonischemic cardiomyopathy 05/16/2022    Nonocclusive CAD 05/16/2022    Ovarian cyst     Periarthritis of shoulder 2010    Pneumonia     NO CURRENT ISSUES    Sleep apnea     Voice hoarseness          Current Outpatient Medications:     baclofen (LIORESAL) 10 MG tablet, TAKE 1 TABLET BY MOUTH THREE TIMES DAILY, Disp: 90 tablet, Rfl: 2    carvedilol (COREG) 6.25 MG tablet, Take 1 tablet by mouth 2 (Two) Times a Day., Disp: 180 tablet, Rfl: 3    Diclofenac Sodium (VOLTAREN) 1 % gel gel, Apply 4 g topically to the appropriate area as directed 4 (Four) Times a Day As Needed (pain)., Disp: 350 g, Rfl: 1    Dulaglutide (Trulicity) 4.5 MG/0.5ML solution pen-injector, Inject 0.5 mL under the skin into the appropriate area as directed 1 (One) Time Per Week., Disp: 6 mL, Rfl: 3    DULoxetine (CYMBALTA) 20 MG capsule, Take 1 capsule by mouth Daily., Disp: , Rfl:     Emollient (COLLAGEN EX), Apply  topically., Disp: , Rfl:     EQ Aspirin Adult Low Dose 81 MG EC tablet, Take 1 tablet by mouth once daily, Disp: 30 tablet, Rfl: 0    Farxiga 10 MG tablet, TAKE 1 TABLET BY MOUTH ONCE DAILY IN THE MORNING, Disp: 90 tablet, Rfl: 0    fluconazole (Diflucan) 150 MG tablet, Take 1 tablet by mouth Every 3 (Three) Days., Disp: 6 tablet, Rfl: 0    Magnesium 400 MG capsule, Take  by mouth. Taking 420 mg daily, Disp: , Rfl:      Melatonin 3 MG capsule, Take  by mouth., Disp: , Rfl:     meloxicam (MOBIC) 15 MG tablet, Take 1 tablet by mouth Daily. (Patient taking differently: Take 0.5 tablets by mouth Daily.), Disp: 30 tablet, Rfl: 2    Prasterone (Intrarosa) 6.5 MG insert, Insert 1 Application into the vagina Every Night., Disp: 30 each, Rfl: 11    rosuvastatin (CRESTOR) 10 MG tablet, TAKE 1 TABLET BY MOUTH ONCE DAILY AT BEDTIME, Disp: 90 tablet, Rfl: 0    spironolactone (ALDACTONE) 25 MG tablet, Take 1 tablet by mouth Daily., Disp: 90 tablet, Rfl: 3    triamcinolone (KENALOG) 0.1 % ointment, Apply 1 Application topically to the appropriate area as directed 2 (Two) Times a Day., Disp: 454 g, Rfl: 0    vitamin D (ERGOCALCIFEROL) 1.25 MG (30133 UT) capsule capsule, Take 1 capsule by mouth once a week, Disp: 12 capsule, Rfl: 0    gabapentin (NEURONTIN) 300 MG capsule, Take 1 capsule by mouth 2 (Two) Times a Day As Needed (pain). (Patient not taking: Reported on 10/23/2024), Disp: 60 capsule, Rfl: 0    gabapentin (NEURONTIN) 600 MG tablet, Take 1 tablet by mouth At Night As Needed (pain). (Patient not taking: Reported on 10/23/2024), Disp: 30 tablet, Rfl: 0    valsartan (DIOVAN) 320 MG tablet, Take 1 tablet by mouth Daily., Disp: 30 tablet, Rfl: 11    Medications Discontinued During This Encounter   Medication Reason    PARoxetine (Paxil) 10 MG tablet Alternate therapy    valsartan (DIOVAN) 160 MG tablet      Allergies   Allergen Reactions    Metformin Diarrhea        Social History     Tobacco Use    Smoking status: Former     Current packs/day: 0.00     Average packs/day: 0.5 packs/day for 10.5 years (5.3 ttl pk-yrs)     Types: Cigarettes     Start date: 1977     Quit date: 10/11/1987     Years since quittin.0     Passive exposure: Past    Smokeless tobacco: Former    Tobacco comments:     25 years ago    Vaping Use    Vaping status: Never Used   Substance Use Topics    Alcohol use: Not Currently     Alcohol/week: 2.0 standard  "drinks of alcohol     Types: 2 Glasses of wine per week     Comment: occasionally drinks, 1 drink per day, has been drinking for 6-10 yrs,1 beer per night     Drug use: No       Family History   Problem Relation Age of Onset    Heart disease Father     Heart failure Father     Hypertension Father     Uterine cancer Mother     Cancer Mother     Diabetes Brother     Heart disease Brother     Diabetes Other     Diabetes Other     Diabetes Maternal Aunt     Diabetes Maternal Uncle     Breast cancer Neg Hx     Ovarian cancer Neg Hx     Colon cancer Neg Hx         Objective     /67   Pulse 88   Ht 162.6 cm (64\")   Wt 81.2 kg (179 lb)   BMI 30.73 kg/m²       Physical Exam    General Appearance:   no acute distress  Alert and oriented x3  HENT:   lips not cyanotic  Atraumatic  Neck:  No jvd   supple  Respiratory:  no respiratory distress  normal breath sounds  no rales  Cardiovascular:  Regular rate and rhythm  no S3, no S4   no murmur  no rub  Extremities  No cyanosis  lower extremity edema: none    Skin:   warm, dry  No rashes      Result Review :     proBNP   Date Value Ref Range Status   08/30/2024 108.0 0.0 - 900.0 pg/mL Final     CMP          2/12/2024    12:39 2/13/2024    09:43 8/30/2024    09:53   CMP   Glucose 117  110  110    BUN 12  14  17    Creatinine 0.79  0.70  0.86    EGFR 84.2  97.3  75.5    Sodium 141  140  141    Potassium 3.9  4.3  4.3    Chloride 104  104  104    Calcium 10.3  9.9  10.3    Total Protein   7.5    Albumin   4.8    Globulin   2.7    Total Bilirubin   0.3    Alkaline Phosphatase   80    AST (SGOT)   20    ALT (SGPT)   21    Albumin/Globulin Ratio   1.8    BUN/Creatinine Ratio 15.2  20.0  19.8    Anion Gap 10.4  10.9  12.0      CBC w/diff          2/12/2024    12:39 2/13/2024    09:43 8/30/2024    09:53   CBC w/Diff   WBC 5.56  5.10  5.25    RBC 5.23  5.15  5.40    Hemoglobin 13.9  13.8  14.7    Hematocrit 43.7  43.3  46.5    MCV 83.6  84.1  86.1    MCH 26.6  26.8  27.2    MCHC " "31.8  31.9  31.6    RDW 13.4  13.4  13.1    Platelets 228  211  229    Neutrophil Rel %   48.6    Immature Granulocyte Rel %   0.2    Lymphocyte Rel %   36.6    Monocyte Rel %   9.5    Eosinophil Rel %   3.0    Basophil Rel %   2.1       Lab Results   Component Value Date    TSH 1.170 10/23/2019      No results found for: \"FREET4\"   No results found for: \"DDIMERQUANT\"  No results found for: \"MG\"   No results found for: \"DIGOXIN\"   No results found for: \"TROPONINT\"        Lipid Panel          11/30/2023    08:54 8/30/2024    09:53   Lipid Panel   Total Cholesterol 123  145    Triglycerides 72  133    HDL Cholesterol 69  64    VLDL Cholesterol 15  23    LDL Cholesterol  39  58    LDL/HDL Ratio 0.57  0.85      No results found for: \"POCTROP\"    Results for orders placed during the hospital encounter of 09/19/24    Adult Transthoracic Echo Complete W/ Cont if Necessary Per Protocol    Interpretation Summary    Left ventricular systolic function is moderately decreased. Calculated left ventricular EF = 30.2% with dyssynchronous contraction pattern    Left ventricular diastolic function is consistent with (grade Ia w/high LAP) impaired relaxation.    The left atrial cavity is moderately dilated.                 Diagnoses and all orders for this visit:    1. NICM (nonischemic cardiomyopathy) (Primary)  Assessment & Plan:  Patient with stable class II symptoms repeat echocardiogram shows EF about 30% still meeting criteria for consideration for prophylactic ICD placement.  Discussed the risk and benefits and using shared decision-making model patient preferred waiting 3 more months before making a decision we will go ahead and uptitrate her Diovan to 320 and repeat echocardiogram and continue discussion and         Orders:  -     Adult Transthoracic Echo Complete W/ Cont if Necessary Per Protocol; Future  -     Basic Metabolic Panel; Future    2. Nonocclusive CAD    3. Benign essential HTN    4. Hyperlipidemia LDL goal " <70    Other orders  -     valsartan (DIOVAN) 320 MG tablet; Take 1 tablet by mouth Daily.  Dispense: 30 tablet; Refill: 11            Follow Up     Return in about 3 months (around 1/23/2025).          Patient was given instructions and counseling regarding her condition or for health maintenance advice. Please see specific information pulled into the AVS if appropriate.

## 2024-10-23 NOTE — ASSESSMENT & PLAN NOTE
Patient with stable class II symptoms repeat echocardiogram shows EF about 30% still meeting criteria for consideration for prophylactic ICD placement.  Discussed the risk and benefits and using shared decision-making model patient preferred waiting 3 more months before making a decision we will go ahead and uptitrate her Diovan to 320 and repeat echocardiogram and continue discussion and

## 2024-11-04 ENCOUNTER — PRIOR AUTHORIZATION (OUTPATIENT)
Dept: INTERNAL MEDICINE | Facility: CLINIC | Age: 64
End: 2024-11-04
Payer: COMMERCIAL

## 2024-11-04 NOTE — TELEPHONE ENCOUNTER
PA required for the following medication:     Dulaglutide (Trulicity) 4.5 MG/0.5ML solution pen-injector (07/29/2024)   KEY:[id number]    Indexed via Onbase

## 2024-11-05 NOTE — TELEPHONE ENCOUNTER
This request has received an approval. View the bottom of the request for an electronic copy of the approval letter.      Approved today by [facility] 2017  Your PA request has been approved. Additional information will be provided in the approval communication. (Message 1140)

## 2024-11-18 RX ORDER — HYDROGEN PEROXIDE 2.65 ML/100ML
LIQUID ORAL; TOPICAL
Qty: 30 TABLET | Refills: 0 | Status: SHIPPED | OUTPATIENT
Start: 2024-11-18

## 2024-11-22 RX ORDER — DAPAGLIFLOZIN 10 MG/1
1 TABLET, FILM COATED ORAL EVERY MORNING
Qty: 90 TABLET | Refills: 0 | Status: SHIPPED | OUTPATIENT
Start: 2024-11-22

## 2024-12-02 NOTE — PROGRESS NOTES
Chief Complaint  Diabetes (Type 2 Follow up ), Annual    Subjective          Silviateddy Massey presents to [facility]  History of Present Illness  Hypertension-patient denies headaches, dizziness, chest pain.  Diabetes-patient has had difficulty losing weight on Trulicity.  Patient previously on Ozempic and Mounjaro but experienced side effects.  Patient will be continued on same medication.  Hyperlipidemia-doing well on statin and aspirin.  Patient is concerned with [facility] function [facility]. [facility] the [facility].      Current Outpatient Medications   Medication Instructions    baclofen (LIORESAL) 10 MG tablet TAKE 1 TABLET BY MOUTH THREE TIMES DAILY    carvedilol (COREG) 6.25 mg, Oral, 2 Times Daily    Diclofenac Sodium (VOLTAREN) 4 g, Topical, 4 Times Daily PRN    DULoxetine (CYMBALTA) 40 mg, Oral, Daily    Emollient (COLLAGEN EX) Apply  topically.    EQ Aspirin Adult Low Dose 81 MG EC tablet Take 1 tablet by mouth once daily    Farxiga 10 mg, Oral, Every Morning    fluconazole (DIFLUCAN) 150 mg, Oral, Every 3 Days    Magnesium 400 MG capsule Take  by mouth. Taking 420 mg daily    Melatonin 3 MG capsule Take  by mouth.    meloxicam (MOBIC) 15 mg, Oral, Daily    Prasterone (Intrarosa) 6.5 MG insert 1 Application, Vaginal, Nightly    rosuvastatin (CRESTOR) 10 MG tablet TAKE 1 TABLET BY MOUTH ONCE DAILY AT BEDTIME    spironolactone (ALDACTONE) 25 mg, Oral, Daily    triamcinolone (KENALOG) 0.1 % ointment 1 Application, Topical, 2 Times Daily    Trulicity 4.5 mg, Subcutaneous, Weekly    valsartan (DIOVAN) 320 mg, Oral, Daily    vitamin D (ERGOCALCIFEROL) 50,000 Units, Oral, Weekly       The following portions of the patient's history were reviewed and updated as appropriate: allergies, current medications, past family history, past medical history, past social history, past surgical history, and problem  "list.    Objective   Vital Signs:   /72 (BP Location: Left arm, Patient Position: Sitting, Cuff Size: Adult)   Pulse 77   Temp 97.2 °F (36.2 °C) (Temporal)   Ht 162.6 cm (64\")   Wt 80.7 kg (178 lb)   SpO2 97%   BMI 30.55 kg/m²     BP Readings from Last 3 Encounters:   12/03/24 114/72   10/23/24 104/67   08/30/24 123/79     Wt Readings from Last 3 Encounters:   12/03/24 80.7 kg (178 lb)   10/23/24 81.2 kg (179 lb)   08/30/24 82.1 kg (181 lb)     SpO2 Readings from Last 3 Encounters:   12/03/24 97%   08/30/24 93%   06/03/24 96%       Physical Exam   Appearance: No acute distress, well-nourished  Head: normocephalic, atraumatic  Eyes: extraocular movements intact, no scleral icterus, no conjunctival injection  Ears, Nose, and Throat: external ears normal, nares patent, moist mucous membranes  Cardiovascular: regular rate and rhythm. no murmurs, rubs, or gallops. no edema  Respiratory: breathing comfortably, symmetric chest rise, clear to auscultation bilaterally. No wheezes, rales, or rhonchi.  Neuro: alert and oriented to time, place, and person. Normal gait  Psych: normal mood and affect     Result Review :   The following data was reviewed by: Louie Jang Jr, MD on 08/30/2024:  Common labs          2/13/2024    09:43 6/3/2024    08:21 8/30/2024    09:53   Common Labs   Glucose 110   110    BUN 14   17    Creatinine 0.70   0.86    Sodium 140   141    Potassium 4.3   4.3    Chloride 104   104    Calcium 9.9   10.3    Albumin   4.8    Total Bilirubin   0.3    Alkaline Phosphatase   80    AST (SGOT)   20    ALT (SGPT)   21    WBC 5.10   5.25    Hemoglobin 13.8   14.7    Hematocrit 43.3   46.5    Platelets 211   229    Total Cholesterol   145    Triglycerides   133    HDL Cholesterol   64    LDL Cholesterol    58    Hemoglobin A1C   6.30    Microalbumin, Urine  2.6       Lab Results   Component Value Date    INR 1.01 02/13/2024    BILIRUBINUR Negative 07/02/2024        Assessment and Plan  "   Diagnoses and all orders for this visit:    1. Annual physical exam (Primary)    2. Type 2 diabetes mellitus with hyperglycemia, without long-term current use of insulin  Comments:  A1c reviewed with good control.    3. Benign essential HTN  Comments:  Doing well at this time.  Continue current med regimen.    4. Hyperlipidemia LDL goal <70  Comments:  Continue statin therapy.    5. Need for COVID-19 vaccine  Comments:  pt defers today    6. ZANE (acute kidney injury)  Comments:  [facility]  Patient currently on medications to help protect kidneys.    Advised on diet, physical activity, etc    Medications Discontinued During This Encounter   Medication Reason    gabapentin (NEURONTIN) 600 MG tablet *Therapy completed    gabapentin (NEURONTIN) 300 MG capsule *Therapy completed        Follow Up   Return in about 6 months (around 6/3/2025) for HTN, DM.  Patient was given instructions and counseling regarding her condition or for health maintenance advice. Please see specific information pulled into the AVS if appropriate.       Louie Jang Jr, MD  12/03/24  11:33 EST

## 2024-12-03 ENCOUNTER — OFFICE VISIT (OUTPATIENT)
Dept: INTERNAL MEDICINE | Facility: CLINIC | Age: 64
End: 2024-12-03
Payer: COMMERCIAL

## 2024-12-03 VITALS
DIASTOLIC BLOOD PRESSURE: 72 MMHG | TEMPERATURE: 97.2 F | OXYGEN SATURATION: 97 % | HEIGHT: 64 IN | WEIGHT: 178 LBS | HEART RATE: 77 BPM | BODY MASS INDEX: 30.39 KG/M2 | SYSTOLIC BLOOD PRESSURE: 114 MMHG

## 2024-12-03 DIAGNOSIS — Z00.00 ANNUAL PHYSICAL EXAM: Primary | ICD-10-CM

## 2024-12-03 DIAGNOSIS — E11.65 TYPE 2 DIABETES MELLITUS WITH HYPERGLYCEMIA, WITHOUT LONG-TERM CURRENT USE OF INSULIN: ICD-10-CM

## 2024-12-03 DIAGNOSIS — Z23 NEED FOR COVID-19 VACCINE: ICD-10-CM

## 2024-12-03 DIAGNOSIS — N17.9 AKI (ACUTE KIDNEY INJURY): ICD-10-CM

## 2024-12-03 DIAGNOSIS — I10 BENIGN ESSENTIAL HTN: ICD-10-CM

## 2024-12-03 DIAGNOSIS — E78.5 HYPERLIPIDEMIA LDL GOAL <70: ICD-10-CM

## 2024-12-03 PROCEDURE — 99396 PREV VISIT EST AGE 40-64: CPT | Performed by: INTERNAL MEDICINE

## 2024-12-04 RX ORDER — ERGOCALCIFEROL 1.25 MG/1
50000 CAPSULE, LIQUID FILLED ORAL WEEKLY
Qty: 12 CAPSULE | Refills: 0 | Status: SHIPPED | OUTPATIENT
Start: 2024-12-04

## 2024-12-10 RX ORDER — ROSUVASTATIN CALCIUM 10 MG/1
TABLET, COATED ORAL
Qty: 90 TABLET | Refills: 0 | Status: SHIPPED | OUTPATIENT
Start: 2024-12-10

## 2024-12-26 ENCOUNTER — TELEPHONE (OUTPATIENT)
Dept: INTERNAL MEDICINE | Facility: CLINIC | Age: 64
End: 2024-12-26

## 2024-12-26 DIAGNOSIS — G89.29 CHRONIC PAIN OF RIGHT KNEE: Primary | ICD-10-CM

## 2024-12-26 DIAGNOSIS — M25.561 CHRONIC PAIN OF RIGHT KNEE: Primary | ICD-10-CM

## 2024-12-26 NOTE — TELEPHONE ENCOUNTER
Caller: Bryson Silvia NANCIE    Relationship: Self    Best call back number: [phone]     What is the medical concern/diagnosis: CONCERNS OF NEEDING KNEE REPLACEMENT, WANTING A SECOND OPINION    What specialty or service is being requested: ORTHOPEDIC SURGEON    What is the provider, practice or medical service name: [facility]    Any additional details: PATIENT STATES [facility] AND WAS ADVISED THAT SHE NEEDS A KNEE REPLACEMENT. PATIENT IS WANTING DR. GALLEGOS TO REFER HER TO SOMEONE FOR A SECOND OPINION.     PATIENT ALSO WANTING DR. GALLEGOS TO REQUEST HER RECORDS FROM [facility] PERTAINING TO HER KNEE ISSUE. PATIENT WAS ADVISED THAT THE ONLY WAY SHE CAN OBTAIN HER XRAY OR MRI RESULTS IS IF DR. GALLEGOS REQUESTS THEM. PLEASE ADVISE.    PATIENT STATES THAT SHE WOULD RATHER HAVE DR. GALLEGOS REVIEW OVER EVERYTHING PERTAINING TO THIS ISSUE BEFORE GOING THROUGH WITH ANY KIND OF SURGERY.

## 2025-01-02 ENCOUNTER — HOSPITAL ENCOUNTER (OUTPATIENT)
Dept: CARDIOLOGY | Facility: HOSPITAL | Age: 65
Discharge: HOME | End: 2025-01-02
Admitting: INTERNAL MEDICINE
Payer: COMMERCIAL

## 2025-01-02 DIAGNOSIS — I42.8 NICM (NONISCHEMIC CARDIOMYOPATHY): ICD-10-CM

## 2025-01-02 LAB
AORTIC DIMENSIONLESS INDEX: 0.79 (DI)
ASCENDING AORTA: 2.7 CM
AV MEAN PRESS GRAD SYS DOP V1V2: 3.3 MMHG
AV VMAX SYS DOP: 113.9 CM/SEC
BH CV ECHO MEAS - AO MAX PG: 5.2 MMHG
BH CV ECHO MEAS - AO ROOT DIAM: 2.6 CM
BH CV ECHO MEAS - AO V2 VTI: 19.5 CM
BH CV ECHO MEAS - EF(MOD-SP2): 35 %
BH CV ECHO MEAS - EF(MOD-SP4): 30.7 %
BH CV ECHO MEAS - FS: 7 %
BH CV ECHO MEAS - IVS/LVPW: 0.9 CM
BH CV ECHO MEAS - IVSD: 0.9 CM
BH CV ECHO MEAS - LA DIMENSION: 3.2 CM
BH CV ECHO MEAS - LV MAX PG: 3.2 MMHG
BH CV ECHO MEAS - LV MEAN PG: 1.6 MMHG
BH CV ECHO MEAS - LV V1 MAX: 90 CM/SEC
BH CV ECHO MEAS - LV V1 VTI: 12.9 CM
BH CV ECHO MEAS - LVIDD: 4.1 CM
BH CV ECHO MEAS - LVIDS: 3.8 CM
BH CV ECHO MEAS - LVOT DIAM: 2 CM
BH CV ECHO MEAS - LVPWD: 1 CM
BH CV ECHO MEAS - RVDD: 3.3 CM
BH CV ECHO MEAS - TAPSE (>1.6): 1.4 CM
BH CV ECHO SHUNT ASSESSMENT PERFORMED (HIDDEN SCRIPTING): 1
LV EF 2D ECHO EST: 35 %

## 2025-01-02 PROCEDURE — 93306 TTE W/DOPPLER COMPLETE: CPT

## 2025-01-02 PROCEDURE — 25510000001 PERFLUTREN 6.52 MG/ML SUSPENSION: Performed by: INTERNAL MEDICINE

## 2025-01-02 RX ORDER — HYDROGEN PEROXIDE 2.65 ML/100ML
LIQUID ORAL; TOPICAL
Qty: 30 TABLET | Refills: 0 | Status: SHIPPED | OUTPATIENT
Start: 2025-01-02

## 2025-01-02 RX ADMIN — PERFLUTREN 3 ML: 6.52 INJECTION, SUSPENSION INTRAVENOUS at 09:02

## 2025-01-07 ENCOUNTER — OFFICE VISIT (OUTPATIENT)
Dept: INTERNAL MEDICINE | Facility: CLINIC | Age: 65
End: 2025-01-07
Payer: COMMERCIAL

## 2025-01-07 ENCOUNTER — HOSPITAL ENCOUNTER (OUTPATIENT)
Dept: GENERAL RADIOLOGY | Facility: HOSPITAL | Age: 65
Discharge: HOME | End: 2025-01-07
Payer: COMMERCIAL

## 2025-01-07 ENCOUNTER — TELEPHONE (OUTPATIENT)
Dept: CARDIOLOGY | Facility: CLINIC | Age: 65
End: 2025-01-07
Payer: COMMERCIAL

## 2025-01-07 ENCOUNTER — TELEPHONE (OUTPATIENT)
Dept: ORTHOPEDIC SURGERY | Facility: CLINIC | Age: 65
End: 2025-01-07
Payer: COMMERCIAL

## 2025-01-07 VITALS
HEIGHT: 64 IN | OXYGEN SATURATION: 97 % | WEIGHT: 179 LBS | DIASTOLIC BLOOD PRESSURE: 80 MMHG | TEMPERATURE: 97.9 F | BODY MASS INDEX: 30.56 KG/M2 | SYSTOLIC BLOOD PRESSURE: 130 MMHG | HEART RATE: 84 BPM

## 2025-01-07 DIAGNOSIS — M79.606 PAIN OF LOWER EXTREMITY, UNSPECIFIED LATERALITY: ICD-10-CM

## 2025-01-07 DIAGNOSIS — M65.319 TRIGGER FINGER OF THUMB, UNSPECIFIED LATERALITY: ICD-10-CM

## 2025-01-07 DIAGNOSIS — L85.3 XEROSIS OF SKIN: ICD-10-CM

## 2025-01-07 DIAGNOSIS — M65.319 TRIGGER FINGER OF THUMB, UNSPECIFIED LATERALITY: Primary | ICD-10-CM

## 2025-01-07 PROCEDURE — 73120 X-RAY EXAM OF HAND: CPT

## 2025-01-07 PROCEDURE — 99213 OFFICE O/P EST LOW 20 MIN: CPT | Performed by: NURSE PRACTITIONER

## 2025-01-07 RX ORDER — TRAZODONE HYDROCHLORIDE 50 MG/1
50 TABLET, FILM COATED ORAL NIGHTLY
COMMUNITY
Start: 2024-12-04

## 2025-01-07 RX ORDER — ROFLUMILAST 3 MG/G
AEROSOL, FOAM TOPICAL
COMMUNITY
Start: 2024-12-26

## 2025-01-07 NOTE — TELEPHONE ENCOUNTER
Provider: ERNIE    Caller: Silvia Massey    Relationship to Patient: Self    Pharmacy:     Phone Number: [phone]    Reason for Call: PT CALLED TO LET US KNOW THAT [facility] TO BRING TO HER APPT AND THEY GAVE HER A NUMBER FOR THE PRACTICE TO CALL TO GET THE INFORMATION - [phone]

## 2025-01-07 NOTE — TELEPHONE ENCOUNTER
----- Message from Cherelle Zavala sent at 1/6/2025  1:24 PM EST -----  Echocardiogram shows improved EF, now 35% however still reduced. Continue current medications. Encourage increased physical activity as tolerated. Follow up as scheduled

## 2025-01-07 NOTE — PROGRESS NOTES
Chief Complaint  Hand Pain (Bilateral. No known injury. Duration of 3 months. )    Subjective          Silvia Massey presents to [facility]  History of Present Illness    History of Present Illness  The patient presents for evaluation of hand pain, leg pain, dry ears, elevated blood pressure, memory issues, carpal tunnel syndrome, and foot pain.    She reports experiencing pain in her hands, which has been persisting for approximately 2 months. The pain is severe enough to impede her ability to bend her fingers or open a jar. She also experiences tingling sensations in her hands, with one hand occasionally becoming immobile. She is seeking an x-ray examination of her hands.    Additionally, she experiences leg pain, predominantly at night when she is lying down, but also occasionally when she is seated. The pain is described as throbbing and affects her entire leg.    She reports dryness and itching in her ears, although she does not experience any associated pain. She has not been using any treatment for this condition. She cleans her ears with a face cloth, without the use of soap.      Current Outpatient Medications   Medication Instructions    baclofen (LIORESAL) 10 MG tablet TAKE 1 TABLET BY MOUTH THREE TIMES DAILY    carvedilol (COREG) 6.25 mg, Oral, 2 Times Daily    Diclofenac Sodium (VOLTAREN) 4 g, Topical, 4 Times Daily PRN    DULoxetine (CYMBALTA) 40 mg, Daily    Emollient (COLLAGEN EX) Apply  topically.    EQ Aspirin Adult Low Dose 81 MG EC tablet Take 1 tablet by mouth once daily    Farxiga 10 mg, Oral, Every Morning    fluconazole (DIFLUCAN) 150 mg, Oral, Every 3 Days    Magnesium 400 MG capsule Take  by mouth. Taking 420 mg daily    Melatonin 3 MG capsule Take  by mouth.    meloxicam (MOBIC) 15 mg, Oral, Daily    Prasterone (Intrarosa) 6.5 MG insert 1 Application, Vaginal, Nightly    rosuvastatin (CRESTOR) 10 MG tablet TAKE 1 TABLET BY MOUTH ONCE  "DAILY AT BEDTIME    spironolactone (ALDACTONE) 25 mg, Oral, Daily    traZODone (DESYREL) 50 mg, Nightly    triamcinolone (KENALOG) 0.1 % ointment 1 Application, Topical, 2 Times Daily    Trulicity 4.5 mg, Subcutaneous, Weekly    valsartan (DIOVAN) 320 mg, Oral, Daily    vitamin D (ERGOCALCIFEROL) 50,000 Units, Oral, Weekly    Zoryve 0.3 % foam        The following portions of the patient's history were reviewed and updated as appropriate: allergies, current medications, past family history, past medical history, past social history, past surgical history, and problem list.    Objective   Vital Signs:   /80 (BP Location: Left arm, Patient Position: Sitting, Cuff Size: Adult)   Pulse 84   Temp 97.9 °F (36.6 °C) (Temporal)   Ht 162.6 cm (64\")   Wt 81.2 kg (179 lb)   SpO2 97%   BMI 30.73 kg/m²     BP Readings from Last 3 Encounters:   01/07/25 130/80   12/03/24 114/72   10/23/24 104/67     Wt Readings from Last 3 Encounters:   01/07/25 81.2 kg (179 lb)   12/03/24 80.7 kg (178 lb)   10/23/24 81.2 kg (179 lb)           Physical Exam     Appearance: No acute distress, well-nourished  Head: normocephalic, atraumatic  Eyes: extraocular movements intact, no scleral icterus, no conjunctival injection  Ears, Nose, and Throat: external ears normal, nares patent, moist mucous membranes  Cardiovascular: regular rate and rhythm. no murmurs, rubs, or gallops. no edema  Respiratory: breathing comfortably, symmetric chest rise, clear to auscultation bilaterally. No wheezes, rales, or rhonchi.  Neuro: alert and oriented to time, place, and person. Normal gait  Psych: normal mood and affect     Physical Exam        Result Review :   The following data was reviewed by: GABRIELLA Cornelius on 01/07/2025:  Common labs          2/13/2024    09:43 6/3/2024    08:21 8/30/2024    09:53   Common Labs   Glucose 110   110    BUN 14   17    Creatinine 0.70   0.86    Sodium 140   141    Potassium 4.3   4.3    Chloride 104   104  "   Calcium 9.9   10.3    Albumin   4.8    Total Bilirubin   0.3    Alkaline Phosphatase   80    AST (SGOT)   20    ALT (SGPT)   21    WBC 5.10   5.25    Hemoglobin 13.8   14.7    Hematocrit 43.3   46.5    Platelets 211   229    Total Cholesterol   145    Triglycerides   133    HDL Cholesterol   64    LDL Cholesterol    58    Hemoglobin A1C   6.30    Microalbumin, Urine  2.6         Results           Lab Results   Component Value Date    INR 1.01 02/13/2024    BILIRUBINUR Negative 07/02/2024            Assessment and Plan    Diagnoses and all orders for this visit:    1. Trigger finger of thumb, unspecified laterality (Primary)  -     Ambulatory Referral to Orthopedic Surgery  -     XR Hand 2 View Left; Future  -     XR Hand 2 View Right; Future    2. Pain of lower extremity, unspecified laterality  -     Diclofenac Sodium (VOLTAREN) 1 % gel gel; Apply 4 g topically to the appropriate area as directed 4 (Four) Times a Day As Needed (pain).  Dispense: 350 g; Refill: 0    3. Xerosis of skin        Assessment & Plan  1. Trigger finger.  The symptoms suggest a diagnosis of trigger finger, which is a manifestation of arthritis. Blood work will be conducted today to rule out the onset of specific types of arthritis. A referral to orthopedics will be made for further evaluation and potential treatment. X-rays of the affected area will also be ordered.    2. Leg pain.  Voltaren gel will be prescribed for application on the affected area. She is advised to monitor the effectiveness of this treatment and report back if there is no improvement.    3. Dry ears.  She is advised to apply Vaseline or Aquaphor to the affected area to alleviate the dryness.        Medications Discontinued During This Encounter   Medication Reason    Diclofenac Sodium (VOLTAREN) 1 % gel gel           Follow Up   Return if symptoms worsen or fail to improve.  Patient was given instructions and counseling regarding her condition or for health maintenance  advice. Please see specific information pulled into the AVS if appropriate.       GABRIELLA Cornelius  01/07/25  12:28 EST      Patient or patient representative verbalized consent for the use of Ambient Listening during the visit with  GABRIELLA Cornelius for chart documentation. 1/7/2025  10:55 EST

## 2025-01-23 ENCOUNTER — OFFICE VISIT (OUTPATIENT)
Dept: CARDIOLOGY | Facility: CLINIC | Age: 65
End: 2025-01-23
Payer: COMMERCIAL

## 2025-01-23 ENCOUNTER — OFFICE VISIT (OUTPATIENT)
Dept: ORTHOPEDIC SURGERY | Facility: CLINIC | Age: 65
End: 2025-01-23
Payer: COMMERCIAL

## 2025-01-23 ENCOUNTER — LAB (OUTPATIENT)
Facility: HOSPITAL | Age: 65
End: 2025-01-23
Payer: COMMERCIAL

## 2025-01-23 VITALS
HEART RATE: 84 BPM | OXYGEN SATURATION: 97 % | DIASTOLIC BLOOD PRESSURE: 77 MMHG | WEIGHT: 178 LBS | BODY MASS INDEX: 30.39 KG/M2 | SYSTOLIC BLOOD PRESSURE: 126 MMHG | HEIGHT: 64 IN

## 2025-01-23 VITALS
HEART RATE: 86 BPM | DIASTOLIC BLOOD PRESSURE: 76 MMHG | OXYGEN SATURATION: 96 % | WEIGHT: 178 LBS | HEIGHT: 64 IN | SYSTOLIC BLOOD PRESSURE: 121 MMHG | BODY MASS INDEX: 30.39 KG/M2

## 2025-01-23 DIAGNOSIS — M17.11 PRIMARY OSTEOARTHRITIS OF RIGHT KNEE: ICD-10-CM

## 2025-01-23 DIAGNOSIS — I42.8 NICM (NONISCHEMIC CARDIOMYOPATHY): ICD-10-CM

## 2025-01-23 DIAGNOSIS — I25.10 NONOCCLUSIVE CORONARY ATHEROSCLEROSIS OF NATIVE CORONARY ARTERY: ICD-10-CM

## 2025-01-23 DIAGNOSIS — I10 BENIGN ESSENTIAL HTN: ICD-10-CM

## 2025-01-23 DIAGNOSIS — I42.8 NICM (NONISCHEMIC CARDIOMYOPATHY): Primary | ICD-10-CM

## 2025-01-23 DIAGNOSIS — M25.561 RIGHT KNEE PAIN, UNSPECIFIED CHRONICITY: Primary | ICD-10-CM

## 2025-01-23 LAB
ANION GAP SERPL CALCULATED.3IONS-SCNC: 11.3 MMOL/L (ref 5–15)
BUN SERPL-MCNC: 12 MG/DL (ref 8–23)
BUN/CREAT SERPL: 12.9 (ref 7–25)
CALCIUM SPEC-SCNC: 10 MG/DL (ref 8.6–10.5)
CHLORIDE SERPL-SCNC: 105 MMOL/L (ref 98–107)
CO2 SERPL-SCNC: 26.7 MMOL/L (ref 22–29)
CREAT SERPL-MCNC: 0.93 MG/DL (ref 0.57–1)
EGFRCR SERPLBLD CKD-EPI 2021: 68.8 ML/MIN/1.73
GLUCOSE SERPL-MCNC: 137 MG/DL (ref 65–99)
POTASSIUM SERPL-SCNC: 4.2 MMOL/L (ref 3.5–5.2)
SODIUM SERPL-SCNC: 143 MMOL/L (ref 136–145)

## 2025-01-23 PROCEDURE — 99214 OFFICE O/P EST MOD 30 MIN: CPT | Performed by: INTERNAL MEDICINE

## 2025-01-23 PROCEDURE — 80048 BASIC METABOLIC PNL TOTAL CA: CPT

## 2025-01-23 PROCEDURE — 36415 COLL VENOUS BLD VENIPUNCTURE: CPT

## 2025-01-23 RX ORDER — CARVEDILOL 6.25 MG/1
6.25 TABLET ORAL 2 TIMES DAILY
Start: 2025-01-23 | End: 2025-01-23

## 2025-01-23 RX ORDER — SPIRONOLACTONE 25 MG/1
25 TABLET ORAL DAILY
Start: 2025-01-23

## 2025-01-23 RX ORDER — DAPAGLIFLOZIN 10 MG/1
1 TABLET, FILM COATED ORAL EVERY MORNING
Start: 2025-01-23

## 2025-01-23 RX ORDER — VALSARTAN 320 MG/1
320 TABLET ORAL DAILY
Start: 2025-01-23

## 2025-01-23 RX ORDER — CARVEDILOL 6.25 MG/1
9.38 TABLET ORAL 2 TIMES DAILY
Qty: 270 TABLET | Refills: 3 | Status: SHIPPED | OUTPATIENT
Start: 2025-01-23

## 2025-01-23 RX ADMIN — LIDOCAINE HYDROCHLORIDE 5 ML: 10 INJECTION, SOLUTION INFILTRATION; PERINEURAL at 11:22

## 2025-01-23 RX ADMIN — TRIAMCINOLONE ACETONIDE 40 MG: 40 INJECTION, SUSPENSION INTRA-ARTICULAR; INTRAMUSCULAR at 11:22

## 2025-01-23 NOTE — PROGRESS NOTES
Chief Complaint  NICM (nonischemic cardiomyopathy) (3m Follow up ), Nonocclusive CAD, Benign essential HTN, and Hyperlipidemia LDL goal <70    Subjective    Patient stable symptomatically with no signal change in her breathing ability her weight is down about 1 pound.  She is less ambulatory due to her meniscal tear but is undergoing physical therapy and trying to resume all physical activities.  Past Medical History:   Diagnosis Date    Arthritis of back     Cataract Mar 2023    CHF (congestive heart failure) 2018    CTS (carpal tunnel syndrome) 2012    Depression     Diabetes mellitus     type 2 DOES NOT CHECK BS DAILY    Forgetfulness     GERD (gastroesophageal reflux disease)     Greater trochanteric pain syndrome 08/01/2022    History of transfusion     Hyperlipidemia     Hypertension     Night sweats     Nonischemic cardiomyopathy 05/16/2022    Nonocclusive CAD 05/16/2022    Ovarian cyst     Periarthritis of shoulder 2010    Pneumonia     NO CURRENT ISSUES    Sleep apnea     Voice hoarseness          Current Outpatient Medications:     Farxiga 10 MG tablet, Take 10 mg by mouth Every Morning., Disp: , Rfl:     meloxicam (MOBIC) 15 MG tablet, Take 1 tablet by mouth Daily. (Patient taking differently: Take 0.5 tablets by mouth Daily.), Disp: 30 tablet, Rfl: 2    spironolactone (ALDACTONE) 25 MG tablet, Take 1 tablet by mouth Daily., Disp: , Rfl:     valsartan (DIOVAN) 320 MG tablet, Take 1 tablet by mouth Daily., Disp: , Rfl:     baclofen (LIORESAL) 10 MG tablet, TAKE 1 TABLET BY MOUTH THREE TIMES DAILY, Disp: 90 tablet, Rfl: 2    carvedilol (COREG) 6.25 MG tablet, Take 1.5 tablets by mouth 2 (Two) Times a Day., Disp: 270 tablet, Rfl: 3    Diclofenac Sodium (VOLTAREN) 1 % gel gel, Apply 4 g topically to the appropriate area as directed 4 (Four) Times a Day As Needed (pain)., Disp: 350 g, Rfl: 0    Dulaglutide (Trulicity) 4.5 MG/0.5ML solution pen-injector, Inject 0.5 mL under the skin into the appropriate area as  directed 1 (One) Time Per Week., Disp: 6 mL, Rfl: 3    DULoxetine (CYMBALTA) 20 MG capsule, Take 2 capsules by mouth Daily., Disp: , Rfl:     Emollient (COLLAGEN EX), Apply  topically., Disp: , Rfl:     EQ Aspirin Adult Low Dose 81 MG EC tablet, Take 1 tablet by mouth once daily, Disp: 30 tablet, Rfl: 0    fluconazole (Diflucan) 150 MG tablet, Take 1 tablet by mouth Every 3 (Three) Days., Disp: 6 tablet, Rfl: 0    Magnesium 400 MG capsule, Take  by mouth. Taking 420 mg daily, Disp: , Rfl:     Melatonin 3 MG capsule, Take  by mouth. (Patient not taking: Reported on 2025), Disp: , Rfl:     Prasterone (Intrarosa) 6.5 MG insert, Insert 1 Application into the vagina Every Night. (Patient not taking: Reported on 2025), Disp: 30 each, Rfl: 11    rosuvastatin (CRESTOR) 10 MG tablet, TAKE 1 TABLET BY MOUTH ONCE DAILY AT BEDTIME, Disp: 90 tablet, Rfl: 0    traZODone (DESYREL) 50 MG tablet, Take 1 tablet by mouth Every Night., Disp: , Rfl:     triamcinolone (KENALOG) 0.1 % ointment, Apply 1 Application topically to the appropriate area as directed 2 (Two) Times a Day., Disp: 454 g, Rfl: 0    vitamin D (ERGOCALCIFEROL) 1.25 MG (62487 UT) capsule capsule, Take 1 capsule by mouth once a week, Disp: 12 capsule, Rfl: 0    Zoryve 0.3 % foam, , Disp: , Rfl:     Medications Discontinued During This Encounter   Medication Reason    carvedilol (COREG) 6.25 MG tablet     spironolactone (ALDACTONE) 25 MG tablet     valsartan (DIOVAN) 320 MG tablet     Farxiga 10 MG tablet     carvedilol (COREG) 6.25 MG tablet      Allergies   Allergen Reactions    Metformin Diarrhea        Social History     Tobacco Use    Smoking status: Former     Current packs/day: 0.00     Average packs/day: 0.5 packs/day for 10.5 years (5.3 ttl pk-yrs)     Types: Cigarettes     Start date: 1977     Quit date: 10/11/1987     Years since quittin.3     Passive exposure: Past    Smokeless tobacco: Former    Tobacco comments:     25 years ago    Vaping  "Use    Vaping status: Never Used   Substance Use Topics    Alcohol use: Not Currently     Alcohol/week: 2.0 standard drinks of alcohol     Types: 2 Glasses of wine per week     Comment: occasionally drinks, 1 drink per day, has been drinking for 6-10 yrs,1 beer per night     Drug use: No       Family History   Problem Relation Age of Onset    Heart disease Father     Heart failure Father     Hypertension Father     Uterine cancer Mother     Cancer Mother     Diabetes Brother     Heart disease Brother     Diabetes Other     Diabetes Other     Diabetes Maternal Aunt     Diabetes Maternal Uncle     Breast cancer Neg Hx     Ovarian cancer Neg Hx     Colon cancer Neg Hx         Objective     /76 (BP Location: Left arm, Patient Position: Sitting, Cuff Size: Adult)   Pulse 86   Ht 162.6 cm (64\")   Wt 80.7 kg (178 lb)   SpO2 96%   BMI 30.55 kg/m²       Physical Exam    General Appearance:   no acute distress  Alert and oriented x3  HENT:   lips not cyanotic  Atraumatic  Neck:  No jvd   supple  Respiratory:  no respiratory distress  normal breath sounds  no rales  Cardiovascular:  Regular rate and rhythm  no S3, no S4   no murmur  no rub  Extremities  No cyanosis  lower extremity edema: Trace  Skin:   warm, dry  No rashes      Result Review :     proBNP   Date Value Ref Range Status   08/30/2024 108.0 0.0 - 900.0 pg/mL Final     CMP          2/13/2024    09:43 8/30/2024    09:53 1/23/2025    06:17   CMP   Glucose 110  110  137    BUN 14  17  12    Creatinine 0.70  0.86  0.93    EGFR 97.3  75.5  68.8    Sodium 140  141  143    Potassium 4.3  4.3  4.2    Chloride 104  104  105    Calcium 9.9  10.3  10.0    Total Protein  7.5     Albumin  4.8     Globulin  2.7     Total Bilirubin  0.3     Alkaline Phosphatase  80     AST (SGOT)  20     ALT (SGPT)  21     Albumin/Globulin Ratio  1.8     BUN/Creatinine Ratio 20.0  19.8  12.9    Anion Gap 10.9  12.0  11.3      CBC w/diff          2/12/2024    12:39 2/13/2024    09:43 " "8/30/2024    09:53   CBC w/Diff   WBC 5.56  5.10  5.25    RBC 5.23  5.15  5.40    Hemoglobin 13.9  13.8  14.7    Hematocrit 43.7  43.3  46.5    MCV 83.6  84.1  86.1    MCH 26.6  26.8  27.2    MCHC 31.8  31.9  31.6    RDW 13.4  13.4  13.1    Platelets 228  211  229    Neutrophil Rel %   48.6    Immature Granulocyte Rel %   0.2    Lymphocyte Rel %   36.6    Monocyte Rel %   9.5    Eosinophil Rel %   3.0    Basophil Rel %   2.1       Lab Results   Component Value Date    TSH 1.170 10/23/2019      No results found for: \"FREET4\"   No results found for: \"DDIMERQUANT\"  No results found for: \"MG\"   No results found for: \"DIGOXIN\"   No results found for: \"TROPONINT\"        Lipid Panel          8/30/2024    09:53   Lipid Panel   Total Cholesterol 145    Triglycerides 133    HDL Cholesterol 64    VLDL Cholesterol 23    LDL Cholesterol  58    LDL/HDL Ratio 0.85      No results found for: \"POCTROP\"    Results for orders placed during the hospital encounter of 01/02/25    Adult Transthoracic Echo Complete W/ Cont if Necessary Per Protocol    Interpretation Summary    Left ventricular systolic function is moderately decreased. Estimated left ventricular EF = 35% with dyssynchronous contraction pattern    The left atrial cavity is moderately dilated.                 Diagnoses and all orders for this visit:    1. Nonischemic cardiomyopathy (Primary)  Comments:  cont BB, ARB, MRA, and SGLT-2  Assessment & Plan:  Patient with mildly reduced ejection fraction stable class II symptoms we will titrate up on her carvedilol to 9.375 twice daily dosing and continue with Aldactone 25, valsartan 320 today, and Farxiga 10 mg daily dosing.          Orders:  -     Farxiga 10 MG tablet; Take 10 mg by mouth Every Morning.  -     valsartan (DIOVAN) 320 MG tablet; Take 1 tablet by mouth Daily.  -     Discontinue: carvedilol (COREG) 6.25 MG tablet; Take 1 tablet by mouth 2 (Two) Times a Day.  -     spironolactone (ALDACTONE) 25 MG tablet; Take 1 " tablet by mouth Daily.  -     carvedilol (COREG) 6.25 MG tablet; Take 1.5 tablets by mouth 2 (Two) Times a Day.  Dispense: 270 tablet; Refill: 3  -     Adult Transthoracic Echo Complete W/ Cont if Necessary Per Protocol; Future    2. Nonocclusive CAD  Comments:  cont BB, statin, ASA.    3. Benign essential HTN  Assessment & Plan:  Blood pressure control continue with Aldactone 25, carvedilol 9.375 twice daily, and valsartan 320 cutaneous               Follow Up     No follow-ups on file.          Patient was given instructions and counseling regarding her condition or for health maintenance advice. Please see specific information pulled into the AVS if appropriate.

## 2025-01-23 NOTE — ASSESSMENT & PLAN NOTE
Blood pressure control continue with Aldactone 25, carvedilol 9.375 twice daily, and valsartan 320 cutaneous

## 2025-01-23 NOTE — ASSESSMENT & PLAN NOTE
Patient with mildly reduced ejection fraction stable class II symptoms we will titrate up on her carvedilol to 9.375 twice daily dosing and continue with Aldactone 25, valsartan 320 today, and Farxiga 10 mg daily dosing.

## 2025-01-23 NOTE — PROGRESS NOTES
Chief Complaint  Pain and Initial Evaluation of the Right Knee       Subjective      Silvia Massey presents to [facility] for an evaluation  of her right knee. Her right knee has been bothering her for several months. She states about three months ago she had increase in swelling but denies any specific injury, trauma, falls or surgery. She states her right knee swelling has improved but is still having pain. She has pain with twisting and lateral  movements. [facility] on her [facility]. She locates her pain to the medial  aspect of her knee.      Allergies   Allergen Reactions    Metformin Diarrhea        Social History     Socioeconomic History    Marital status:    Tobacco Use    Smoking status: Former     Current packs/day: 0.00     Average packs/day: 0.5 packs/day for 10.5 years (5.3 ttl pk-yrs)     Types: Cigarettes     Start date: 1977     Quit date: 10/11/1987     Years since quittin.3     Passive exposure: Past    Smokeless tobacco: Former    Tobacco comments:     25 years ago    Vaping Use    Vaping status: Never Used   Substance and Sexual Activity    Alcohol use: Not Currently     Alcohol/week: 2.0 standard drinks of alcohol     Types: 2 Glasses of wine per week     Comment: occasionally drinks, 1 drink per day, has been drinking for 6-10 yrs,1 beer per night     Drug use: No    Sexual activity: Not Currently     Partners: Male     Birth control/protection: Hysterectomy     Comment: Partcial hysterectomy  one ivary and one tube        I reviewed the patient's chief complaint, history of present illness, review of systems, past medical history, surgical history, family history, social history, medications, and allergy list.     Review of Systems     Constitutional: Denies fevers, chills, weight loss  Cardiovascular: Denies chest pain, shortness of breath  Skin: Denies rashes, acute skin changes  Neurologic: Denies  "headache, loss of consciousness  MSK: Right knee pain       Vital Signs:   /77   Pulse 84   Ht 162.6 cm (64\")   Wt 80.7 kg (178 lb)   SpO2 97%   BMI 30.55 kg/m²            Ortho Exam    Physical Exam  General:Alert. No acute distress     Right lower extremity: tender to the medial joint line and lateral joint line, full extension, flexion to 120 degrees, mild crepitus with range of motion, stable to varus/valgus stress, stable to anterior/posterior drawer, negative  Lachman's, pain with Percy's, distal neurovascularly intact, positive  pulses, positive EHL, FHL, GS, and TA. Sensation intact to all 5 nerves of the foot.     RIGHT KNEE INJECTION: R knee  Date/Time: 1/23/2025 11:22 AM  Consent given by: patient  Site marked: site marked  Timeout: Immediately prior to procedure a time out was called to verify the correct patient, procedure, equipment, support staff and site/side marked as required   Supporting Documentation  Indications: pain   Procedure Details  Location: knee - R knee  Needle gauge: 21G.  Medications administered: 5 mL lidocaine 1 %; 40 mg triamcinolone acetonide 40 MG/ML  Patient tolerance: patient tolerated the procedure well with no immediate complications    This injection documentation was Scribed for Arnulfo Armijo MD by Kari Limon MA.  01/23/25   11:22 EST    X-Ray Report:  Right knee X-Ray  Indication: Evaluation of right knee pain   AP/Lateral and Standing view(s)  Findings: mild to moderate degenerative changes tricompartmental, no significant effusion, no acute fracture   Prior studies available for comparison: no       Imaging Results (Most Recent)       Procedure Component Value Units Date/Time    XR Knee 3 View Right [[id number]] Resulted: 01/23/25 0845     Updated: 01/23/25 0849             Result Review :       XR Hand 2 View Left    Result Date: 1/7/2025  Narrative: XR HAND 2 VW LEFT, XR HAND 2 VW RIGHT Date of Exam: 1/7/2025 11:23 AM EST Indication: hand pain " Comparison: X-ray September 25, 2023 Findings: Left hand: There is small osseous bodies adjacent to the DIP joint third digit that could reflect sequela to degenerative process or old trauma and secondary degenerative change. There is no subluxation or dislocation. There are some degenerative change around the first carpal metacarpal joint space. Right hand: There is no subluxation or dislocation. There are some degenerative change involving the DIP joint of the first digit. There is also some degenerative change involving the first carpal metacarpal joint space. There is no radiopaque foreign body. No definite erosive changes are seen.     Impression: Impression: Evidence for some degenerative change involving the DIP joint of the third digit on the left and first carpal metacarpal joint space bilaterally and DIP joint of the first digit on the right. Electronically Signed: Rafael Stern MD  1/7/2025 12:10 PM EST  Workstation ID: [id number]    XR Hand 2 View Right    Result Date: 1/7/2025  Narrative: XR HAND 2 VW LEFT, XR HAND 2 VW RIGHT Date of Exam: 1/7/2025 11:23 AM EST Indication: hand pain Comparison: X-ray September 25, 2023 Findings: Left hand: There is small osseous bodies adjacent to the DIP joint third digit that could reflect sequela to degenerative process or old trauma and secondary degenerative change. There is no subluxation or dislocation. There are some degenerative change around the first carpal metacarpal joint space. Right hand: There is no subluxation or dislocation. There are some degenerative change involving the DIP joint of the first digit. There is also some degenerative change involving the first carpal metacarpal joint space. There is no radiopaque foreign body. No definite erosive changes are seen.     Impression: Impression: Evidence for some degenerative change involving the DIP joint of the third digit on the left and first carpal metacarpal joint space bilaterally and DIP joint of the  first digit on the right. Electronically Signed: Rafael Stern MD  1/7/2025 12:10 PM EST  Workstation ID: [id number]    Adult Transthoracic Echo Complete W/ Cont if Necessary Per Protocol    Result Date: 1/2/2025  Narrative:   Left ventricular systolic function is moderately decreased. Estimated left ventricular EF = 35% with dyssynchronous contraction pattern   The left atrial cavity is moderately dilated.             Assessment and Plan     Diagnoses and all orders for this visit:    1. Right knee pain, unspecified chronicity (Primary)  -     XR Knee 3 View Right    2. Primary osteoarthritis of right knee      The patient presents here today for an evaluation  of her right shoulder. X-rays were obtained in the office today and these were reviewed today.     Discussed operative treatment options regarding a right knee arthroplasty with siobhan versus non operative treatment options regarding injections, medications and therapy.     Patient wishes to proceed with conservative treatment options.     Discussed the risks and benefits of a right knee steroid injection today in the office. Patient expressed understanding and wishes to proceed. She tolerated the injection well and without any complications.     Discussed with the patient that due to the steroid injection given today in the office they may see an increase in blood sugar for a few days. Advised patient to monitor sugar after receiving the injection.       Home exercises given today and will continue current medications for pain control.     Call or return if worsening symptoms.    Follow Up     6 - 8 weeks     Patient was given instructions and counseling regarding her condition or for health maintenance advice. Please see specific information pulled into the AVS if appropriate.     Scribed for Arnulfo Armijo MD by Antonella Mishra.  01/23/25   08:51 EST    I have personally performed the services described in this document as scribed by the above individual  and it is both accurate and complete. Arnulfo Armijo MD 01/25/25

## 2025-01-25 RX ORDER — LIDOCAINE HYDROCHLORIDE 10 MG/ML
5 INJECTION, SOLUTION INFILTRATION; PERINEURAL
Status: COMPLETED | OUTPATIENT
Start: 2025-01-23 | End: 2025-01-23

## 2025-01-25 RX ORDER — TRIAMCINOLONE ACETONIDE 40 MG/ML
40 INJECTION, SUSPENSION INTRA-ARTICULAR; INTRAMUSCULAR
Status: COMPLETED | OUTPATIENT
Start: 2025-01-23 | End: 2025-01-23

## 2025-01-31 ENCOUNTER — OFFICE VISIT (OUTPATIENT)
Dept: ORTHOPEDIC SURGERY | Facility: CLINIC | Age: 65
End: 2025-01-31
Payer: COMMERCIAL

## 2025-01-31 VITALS
DIASTOLIC BLOOD PRESSURE: 74 MMHG | WEIGHT: 178 LBS | OXYGEN SATURATION: 96 % | HEART RATE: 85 BPM | BODY MASS INDEX: 30.39 KG/M2 | SYSTOLIC BLOOD PRESSURE: 115 MMHG | HEIGHT: 64 IN

## 2025-01-31 DIAGNOSIS — M65.312 TRIGGER THUMB OF LEFT HAND: Primary | ICD-10-CM

## 2025-01-31 DIAGNOSIS — M65.311 TRIGGER THUMB OF RIGHT HAND: ICD-10-CM

## 2025-01-31 DIAGNOSIS — R20.2 PARESTHESIA OF BOTH HANDS: ICD-10-CM

## 2025-01-31 DIAGNOSIS — M77.8 LEFT SHOULDER TENDONITIS: ICD-10-CM

## 2025-01-31 DIAGNOSIS — M75.42 IMPINGEMENT SYNDROME OF LEFT SHOULDER: ICD-10-CM

## 2025-01-31 RX ORDER — LIDOCAINE HYDROCHLORIDE 10 MG/ML
1 INJECTION, SOLUTION INFILTRATION; PERINEURAL
Status: COMPLETED | OUTPATIENT
Start: 2025-01-31 | End: 2025-01-31

## 2025-01-31 RX ORDER — LIDOCAINE HYDROCHLORIDE 10 MG/ML
5 INJECTION, SOLUTION INFILTRATION; PERINEURAL
Status: COMPLETED | OUTPATIENT
Start: 2025-01-31 | End: 2025-01-31

## 2025-01-31 RX ORDER — ZOLPIDEM TARTRATE 5 MG/1
5 TABLET ORAL
COMMUNITY
Start: 2025-01-27

## 2025-01-31 RX ORDER — TRIAMCINOLONE ACETONIDE 40 MG/ML
40 INJECTION, SUSPENSION INTRA-ARTICULAR; INTRAMUSCULAR
Status: COMPLETED | OUTPATIENT
Start: 2025-01-31 | End: 2025-01-31

## 2025-01-31 RX ADMIN — LIDOCAINE HYDROCHLORIDE 5 ML: 10 INJECTION, SOLUTION INFILTRATION; PERINEURAL at 10:45

## 2025-01-31 RX ADMIN — LIDOCAINE HYDROCHLORIDE 1 ML: 10 INJECTION, SOLUTION INFILTRATION; PERINEURAL at 09:45

## 2025-01-31 RX ADMIN — TRIAMCINOLONE ACETONIDE 40 MG: 40 INJECTION, SUSPENSION INTRA-ARTICULAR; INTRAMUSCULAR at 10:45

## 2025-01-31 RX ADMIN — TRIAMCINOLONE ACETONIDE 40 MG: 40 INJECTION, SUSPENSION INTRA-ARTICULAR; INTRAMUSCULAR at 09:45

## 2025-01-31 NOTE — PROGRESS NOTES
Chief Complaint  Pain and Initial Evaluation of the Left Hand and Pain and Initial Evaluation of the Right Hand       Subjective      Silvia Massey presents to [facility] for an evaluation  of her bilateral hand. Her hands have been bothering her for several months. She has numbness and tingling to her hands. She had pain at nighttime but states this has gotten worse. She gets locking and triggering to her thumbs. She also has pain to her left shoulder and has decrease in range of motion. She denies any specific injury, falls or surgery.     Allergies   Allergen Reactions    Metformin Diarrhea        Social History     Socioeconomic History    Marital status:    Tobacco Use    Smoking status: Former     Current packs/day: 0.00     Average packs/day: 0.5 packs/day for 10.5 years (5.3 ttl pk-yrs)     Types: Cigarettes     Start date: 1977     Quit date: 10/11/1987     Years since quittin.3     Passive exposure: Past    Smokeless tobacco: Former    Tobacco comments:     25 years ago    Vaping Use    Vaping status: Never Used   Substance and Sexual Activity    Alcohol use: Not Currently     Alcohol/week: 2.0 standard drinks of alcohol     Types: 2 Glasses of wine per week     Comment: occasionally drinks, 1 drink per day, has been drinking for 6-10 yrs,1 beer per night     Drug use: No    Sexual activity: Not Currently     Partners: Male     Birth control/protection: Hysterectomy     Comment: Partcial hysterectomy  one ivary and one tube        I reviewed the patient's chief complaint, history of present illness, review of systems, past medical history, surgical history, family history, social history, medications, and allergy list.     Review of Systems     Constitutional: Denies fevers, chills, weight loss  Cardiovascular: Denies chest pain, shortness of breath  Skin: Denies rashes, acute skin changes  Neurologic: Denies headache, loss of consciousness  MSK:  "Bilateral hand pain and left shoulder pain     Vital Signs:   /74   Pulse 85   Ht 162.6 cm (64\")   Wt 80.7 kg (178 lb)   SpO2 96%   BMI 30.55 kg/m²            Ortho Exam    Physical Exam  General:Alert. No acute distress     Right upper extremity: mild tenderness to the right thumb A1 pulley, positive  triggering and locking to the thumb, decreased sensation to light touch to the median nerve, positive  compression and Tinel's, neurovascularly intact, positive  pulses, sensation intact to the radial and ulnar nerve.     Left upper extremity: tender to the left thumb A1 pulley, positive  triggering and locking to the thumb, decreased sensation to light touch to the median nerve, positive  compression and Tinel's, neurovascularly intact, positive  pulses, sensation intact to the radial and ulnar nerve.     Left shoulder: limited range of motion secondary to pain, positive  impingement, mild tenderness to the AC joint, neurovascularly intact, weakness with rotator cuff test, positive  pulses.     LEFT SHOULDER INJECTION  Date/Time: 1/31/2025 10:45 AM  Consent given by: patient  Site marked: site marked  Timeout: Immediately prior to procedure a time out was called to verify the correct patient, procedure, equipment, support staff and site/side marked as required   Supporting Documentation  Indications: pain   Procedure Details  Location: shoulder (LEFT) -   Needle gauge: 21G.  Medications administered: 5 mL lidocaine 1 %; 40 mg triamcinolone acetonide 40 MG/ML  Patient tolerance: patient tolerated the procedure well with no immediate complications      RIGHT THUMB TRIGGER INJECTION  Consent given by: patient  Site marked: site marked  Timeout: Immediately prior to procedure a time out was called to verify the correct patient, procedure, equipment, support staff and site/side marked as required   Supporting Documentation  Indications: pain   Procedure Details  Location: thumb (RIGHT) -   Needle gauge: " 23G.  Medications administered: 1 mL lidocaine 1 %; 40 mg triamcinolone acetonide 40 MG/ML  Patient tolerance: patient tolerated the procedure well with no immediate complications      LEFT THUMB TRIGGER INJECTION  Consent given by: patient  Site marked: site marked  Timeout: Immediately prior to procedure a time out was called to verify the correct patient, procedure, equipment, support staff and site/side marked as required   Supporting Documentation  Indications: pain   Procedure Details  Location: thumb (LEFT) -   Needle gauge: 23G.  Medications administered: 1 mL lidocaine 1 %; 40 mg triamcinolone acetonide 40 MG/ML  Patient tolerance: patient tolerated the procedure well with no immediate complications    This injection documentation was Scribed for Arnulfo Armijo MD by Kari Limon MA.  01/31/25   10:47 EST    Imaging Results (Most Recent)       None             Result Review :       XR Knee 3 View Right    Result Date: 1/23/2025  Narrative: X-Ray Report: Right knee X-Ray Indication: Evaluation of right knee pain AP/Lateral and Standing view(s) Findings: mild to moderate degenerative changes tricompartmental, no significant effusion, no acute fracture Prior studies available for comparison: no     XR Hand 2 View Left    Result Date: 1/7/2025  Narrative: XR HAND 2 VW LEFT, XR HAND 2 VW RIGHT Date of Exam: 1/7/2025 11:23 AM EST Indication: hand pain Comparison: X-ray September 25, 2023 Findings: Left hand: There is small osseous bodies adjacent to the DIP joint third digit that could reflect sequela to degenerative process or old trauma and secondary degenerative change. There is no subluxation or dislocation. There are some degenerative change around the first carpal metacarpal joint space. Right hand: There is no subluxation or dislocation. There are some degenerative change involving the DIP joint of the first digit. There is also some degenerative change involving the first carpal metacarpal joint  space. There is no radiopaque foreign body. No definite erosive changes are seen.     Impression: Impression: Evidence for some degenerative change involving the DIP joint of the third digit on the left and first carpal metacarpal joint space bilaterally and DIP joint of the first digit on the right. Electronically Signed: Rafael Stern MD  1/7/2025 12:10 PM EST  Workstation ID: [id number]    XR Hand 2 View Right    Result Date: 1/7/2025  Narrative: XR HAND 2 VW LEFT, XR HAND 2 VW RIGHT Date of Exam: 1/7/2025 11:23 AM EST Indication: hand pain Comparison: X-ray September 25, 2023 Findings: Left hand: There is small osseous bodies adjacent to the DIP joint third digit that could reflect sequela to degenerative process or old trauma and secondary degenerative change. There is no subluxation or dislocation. There are some degenerative change around the first carpal metacarpal joint space. Right hand: There is no subluxation or dislocation. There are some degenerative change involving the DIP joint of the first digit. There is also some degenerative change involving the first carpal metacarpal joint space. There is no radiopaque foreign body. No definite erosive changes are seen.     Impression: Impression: Evidence for some degenerative change involving the DIP joint of the third digit on the left and first carpal metacarpal joint space bilaterally and DIP joint of the first digit on the right. Electronically Signed: Rafael Stern MD  1/7/2025 12:10 PM EST  Workstation ID: [id number]    Adult Transthoracic Echo Complete W/ Cont if Necessary Per Protocol    Result Date: 1/2/2025  Narrative:   Left ventricular systolic function is moderately decreased. Estimated left ventricular EF = 35% with dyssynchronous contraction pattern   The left atrial cavity is moderately dilated.             Assessment and Plan     Diagnoses and all orders for this visit:    1. Trigger thumb of left hand (Primary)    2. Trigger thumb of right  hand    3. Paresthesia of both hands    4. Impingement syndrome of left shoulder    5. Left shoulder tendonitis        The patient presents here today for an evaluation  of bilateral hand.     Discussed the risks and benefits of bilateral thumb trigger and a left shoulder steroid injections today in the office. Patient expressed understanding and wishes to proceed. Patient tolerted the injection well and without any complications.     Discussed with the patient that due to the steroid injection given today in the office they may see an increase in blood sugar for a few days. Advised patient to monitor sugar after receiving the injection.     EMG order placed today to evaluate her paresthesia.     Call or return if worsening symptoms.    Follow Up       After EMG     Patient was given instructions and counseling regarding her condition or for health maintenance advice. Please see specific information pulled into the AVS if appropriate.     Scribed for Arnulfo Armijo MD by Antonella Mishra.  01/31/25   09:42 EST    I have personally performed the services described in this document as scribed by the above individual and it is both accurate and complete. Arnulfo Armijo MD 01/31/25

## 2025-02-17 DIAGNOSIS — I42.8 NICM (NONISCHEMIC CARDIOMYOPATHY): ICD-10-CM

## 2025-02-17 RX ORDER — DAPAGLIFLOZIN 10 MG/1
1 TABLET, FILM COATED ORAL EVERY MORNING
Qty: 90 TABLET | Refills: 0 | OUTPATIENT
Start: 2025-02-17

## 2025-02-18 ENCOUNTER — HOSPITAL ENCOUNTER (OUTPATIENT)
Facility: HOSPITAL | Age: 65
Discharge: HOME | End: 2025-02-18
Admitting: ORTHOPAEDIC SURGERY
Payer: COMMERCIAL

## 2025-02-18 DIAGNOSIS — R20.2 PARESTHESIA OF BOTH HANDS: ICD-10-CM

## 2025-02-18 DIAGNOSIS — M65.312 TRIGGER THUMB OF LEFT HAND: ICD-10-CM

## 2025-02-18 DIAGNOSIS — M65.311 TRIGGER THUMB OF RIGHT HAND: ICD-10-CM

## 2025-02-18 DIAGNOSIS — I42.8 NICM (NONISCHEMIC CARDIOMYOPATHY): ICD-10-CM

## 2025-02-18 PROCEDURE — 95886 MUSC TEST DONE W/N TEST COMP: CPT

## 2025-02-18 PROCEDURE — 95911 NRV CNDJ TEST 9-10 STUDIES: CPT

## 2025-02-18 RX ORDER — DAPAGLIFLOZIN 10 MG/1
1 TABLET, FILM COATED ORAL EVERY MORNING
Qty: 90 TABLET | Refills: 3 | Status: SHIPPED | OUTPATIENT
Start: 2025-02-18

## 2025-02-18 RX ORDER — HYDROGEN PEROXIDE 2.65 ML/100ML
LIQUID ORAL; TOPICAL
Qty: 30 TABLET | Refills: 0 | Status: SHIPPED | OUTPATIENT
Start: 2025-02-18

## 2025-02-18 RX ORDER — DAPAGLIFLOZIN 10 MG/1
1 TABLET, FILM COATED ORAL EVERY MORNING
Qty: 30 TABLET
Start: 2025-02-18 | End: 2025-02-18

## 2025-02-24 RX ORDER — ERGOCALCIFEROL 1.25 MG/1
50000 CAPSULE, LIQUID FILLED ORAL WEEKLY
Qty: 12 CAPSULE | Refills: 0 | Status: SHIPPED | OUTPATIENT
Start: 2025-02-24

## 2025-03-06 RX ORDER — ROSUVASTATIN CALCIUM 10 MG/1
TABLET, COATED ORAL
Qty: 90 TABLET | Refills: 0 | Status: SHIPPED | OUTPATIENT
Start: 2025-03-06

## 2025-03-07 ENCOUNTER — TELEPHONE (OUTPATIENT)
Dept: INTERNAL MEDICINE | Facility: CLINIC | Age: 65
End: 2025-03-07
Payer: COMMERCIAL

## 2025-03-07 ENCOUNTER — PATIENT MESSAGE (OUTPATIENT)
Dept: INTERNAL MEDICINE | Facility: CLINIC | Age: 65
End: 2025-03-07
Payer: COMMERCIAL

## 2025-03-07 DIAGNOSIS — E61.1 IRON DEFICIENCY: ICD-10-CM

## 2025-03-07 DIAGNOSIS — E78.5 HYPERLIPIDEMIA LDL GOAL <70: ICD-10-CM

## 2025-03-07 DIAGNOSIS — E11.65 TYPE 2 DIABETES MELLITUS WITH HYPERGLYCEMIA, WITHOUT LONG-TERM CURRENT USE OF INSULIN: ICD-10-CM

## 2025-03-07 DIAGNOSIS — E55.9 VITAMIN D DEFICIENCY: ICD-10-CM

## 2025-03-07 DIAGNOSIS — I10 BENIGN ESSENTIAL HTN: Primary | ICD-10-CM

## 2025-03-07 NOTE — TELEPHONE ENCOUNTER
Caller: Silvia Massey    Relationship: Self    Best call back number: [phone]     What orders are you requesting (i.e. lab or imaging): AUTO-IMMUNO DEFICIENCY OR AUTO-VEIN DEFICIENCY TESTING     In what timeframe would the patient need to come in: ASAP    Where will you receive your lab/imaging services: [facility]    Additional notes: PATIENT STATES THEY WOULD LIKE TO HAVE THIS TESTING DONE TO SEE IF IT IS THE CAUSE OF THEIR COLD FEET AND HANDS. PATIENT STATES THEY NOTICE VEINS POPPING OUT IN THEIR LEGS AND THINK THAT COULD BE A SYMPTOM AS WELL.

## 2025-03-11 ENCOUNTER — LAB (OUTPATIENT)
Facility: HOSPITAL | Age: 65
End: 2025-03-11
Payer: COMMERCIAL

## 2025-03-11 DIAGNOSIS — E55.9 VITAMIN D DEFICIENCY: ICD-10-CM

## 2025-03-11 DIAGNOSIS — I10 BENIGN ESSENTIAL HTN: ICD-10-CM

## 2025-03-11 DIAGNOSIS — E78.5 HYPERLIPIDEMIA LDL GOAL <70: ICD-10-CM

## 2025-03-11 DIAGNOSIS — E61.1 IRON DEFICIENCY: ICD-10-CM

## 2025-03-11 DIAGNOSIS — E11.65 TYPE 2 DIABETES MELLITUS WITH HYPERGLYCEMIA, WITHOUT LONG-TERM CURRENT USE OF INSULIN: ICD-10-CM

## 2025-03-11 LAB
25(OH)D3 SERPL-MCNC: 67.8 NG/ML (ref 30–100)
ALBUMIN SERPL-MCNC: 4.4 G/DL (ref 3.5–5.2)
ALBUMIN UR-MCNC: 1.5 MG/DL
ALBUMIN/GLOB SERPL: 1.2 G/DL
ALP SERPL-CCNC: 63 U/L (ref 39–117)
ALT SERPL W P-5'-P-CCNC: 35 U/L (ref 1–33)
ANION GAP SERPL CALCULATED.3IONS-SCNC: 15.3 MMOL/L (ref 5–15)
AST SERPL-CCNC: 18 U/L (ref 1–32)
BASOPHILS # BLD AUTO: 0.04 10*3/MM3 (ref 0–0.2)
BASOPHILS NFR BLD AUTO: 0.6 % (ref 0–1.5)
BILIRUB SERPL-MCNC: 0.6 MG/DL (ref 0–1.2)
BUN SERPL-MCNC: 19 MG/DL (ref 8–23)
BUN/CREAT SERPL: 18.1 (ref 7–25)
CALCIUM SPEC-SCNC: 10.7 MG/DL (ref 8.6–10.5)
CHLORIDE SERPL-SCNC: 95 MMOL/L (ref 98–107)
CHOLEST SERPL-MCNC: 143 MG/DL (ref 0–200)
CO2 SERPL-SCNC: 26.7 MMOL/L (ref 22–29)
CREAT SERPL-MCNC: 1.05 MG/DL (ref 0.57–1)
CREAT UR-MCNC: 67.5 MG/DL
DEPRECATED RDW RBC AUTO: 41.8 FL (ref 37–54)
EGFRCR SERPLBLD CKD-EPI 2021: 59.5 ML/MIN/1.73
EOSINOPHIL # BLD AUTO: 0.06 10*3/MM3 (ref 0–0.4)
EOSINOPHIL NFR BLD AUTO: 0.9 % (ref 0.3–6.2)
ERYTHROCYTE [DISTWIDTH] IN BLOOD BY AUTOMATED COUNT: 13.6 % (ref 12.3–15.4)
FERRITIN SERPL-MCNC: 257 NG/ML (ref 13–150)
GLOBULIN UR ELPH-MCNC: 3.6 GM/DL
GLUCOSE SERPL-MCNC: 139 MG/DL (ref 65–99)
HBA1C MFR BLD: 7.6 % (ref 4.8–5.6)
HCT VFR BLD AUTO: 46.2 % (ref 34–46.6)
HDLC SERPL-MCNC: 81 MG/DL (ref 40–60)
HGB BLD-MCNC: 15.3 G/DL (ref 12–15.9)
IMM GRANULOCYTES # BLD AUTO: 0.03 10*3/MM3 (ref 0–0.05)
IMM GRANULOCYTES NFR BLD AUTO: 0.5 % (ref 0–0.5)
IRON 24H UR-MRATE: 88 MCG/DL (ref 37–145)
IRON SATN MFR SERPL: 18 % (ref 20–50)
LDLC SERPL CALC-MCNC: 44 MG/DL (ref 0–100)
LDLC/HDLC SERPL: 0.53 {RATIO}
LYMPHOCYTES # BLD AUTO: 1.96 10*3/MM3 (ref 0.7–3.1)
LYMPHOCYTES NFR BLD AUTO: 31 % (ref 19.6–45.3)
MCH RBC QN AUTO: 28 PG (ref 26.6–33)
MCHC RBC AUTO-ENTMCNC: 33.1 G/DL (ref 31.5–35.7)
MCV RBC AUTO: 84.5 FL (ref 79–97)
MICROALBUMIN/CREAT UR: 22.2 MG/G (ref 0–29)
MONOCYTES # BLD AUTO: 0.45 10*3/MM3 (ref 0.1–0.9)
MONOCYTES NFR BLD AUTO: 7.1 % (ref 5–12)
NEUTROPHILS NFR BLD AUTO: 3.79 10*3/MM3 (ref 1.7–7)
NEUTROPHILS NFR BLD AUTO: 59.9 % (ref 42.7–76)
NRBC BLD AUTO-RTO: 0 /100 WBC (ref 0–0.2)
PLATELET # BLD AUTO: 284 10*3/MM3 (ref 140–450)
PMV BLD AUTO: 10.3 FL (ref 6–12)
POTASSIUM SERPL-SCNC: 4.9 MMOL/L (ref 3.5–5.2)
PROT SERPL-MCNC: 8 G/DL (ref 6–8.5)
RBC # BLD AUTO: 5.47 10*6/MM3 (ref 3.77–5.28)
SODIUM SERPL-SCNC: 137 MMOL/L (ref 136–145)
TIBC SERPL-MCNC: 481 MCG/DL (ref 298–536)
TRANSFERRIN SERPL-MCNC: 323 MG/DL (ref 200–360)
TRIGL SERPL-MCNC: 97 MG/DL (ref 0–150)
VLDLC SERPL-MCNC: 18 MG/DL (ref 5–40)
WBC NRBC COR # BLD AUTO: 6.33 10*3/MM3 (ref 3.4–10.8)

## 2025-03-11 PROCEDURE — 80061 LIPID PANEL: CPT

## 2025-03-11 PROCEDURE — 82570 ASSAY OF URINE CREATININE: CPT

## 2025-03-11 PROCEDURE — 82306 VITAMIN D 25 HYDROXY: CPT

## 2025-03-11 PROCEDURE — 36415 COLL VENOUS BLD VENIPUNCTURE: CPT

## 2025-03-11 PROCEDURE — 85025 COMPLETE CBC W/AUTO DIFF WBC: CPT

## 2025-03-11 PROCEDURE — 82728 ASSAY OF FERRITIN: CPT

## 2025-03-11 PROCEDURE — 82043 UR ALBUMIN QUANTITATIVE: CPT

## 2025-03-11 PROCEDURE — 80053 COMPREHEN METABOLIC PANEL: CPT

## 2025-03-11 PROCEDURE — 83540 ASSAY OF IRON: CPT

## 2025-03-11 PROCEDURE — 84466 ASSAY OF TRANSFERRIN: CPT

## 2025-03-11 PROCEDURE — 83036 HEMOGLOBIN GLYCOSYLATED A1C: CPT

## 2025-03-11 NOTE — TELEPHONE ENCOUNTER
"Tried to call patient no answer left vm.    Relay     \"I am not sure what testing she is referring to. Please schedule appointment to discuss further. \"                "

## 2025-03-11 NOTE — TELEPHONE ENCOUNTER
Name: Silvia Massey NANCIE    Relationship: Self    Best Callback Number: [phone]     HUB PROVIDED THE RELAY MESSAGE FROM THE OFFICE   PATIENT VOICED UNDERSTANDING AND HAS NO FURTHER QUESTIONS AT THIS TIME

## 2025-03-31 RX ORDER — HYDROGEN PEROXIDE 2.65 ML/100ML
81 LIQUID ORAL; TOPICAL DAILY
Qty: 30 TABLET | Refills: 0 | Status: SHIPPED | OUTPATIENT
Start: 2025-03-31

## 2025-04-24 ENCOUNTER — OFFICE VISIT (OUTPATIENT)
Dept: ORTHOPEDIC SURGERY | Facility: CLINIC | Age: 65
End: 2025-04-24
Payer: COMMERCIAL

## 2025-04-24 VITALS
SYSTOLIC BLOOD PRESSURE: 136 MMHG | HEART RATE: 80 BPM | DIASTOLIC BLOOD PRESSURE: 79 MMHG | BODY MASS INDEX: 30.39 KG/M2 | WEIGHT: 178 LBS | OXYGEN SATURATION: 96 % | HEIGHT: 64 IN

## 2025-04-24 DIAGNOSIS — M75.51 BILATERAL SHOULDER BURSITIS: ICD-10-CM

## 2025-04-24 DIAGNOSIS — M75.42 IMPINGEMENT SYNDROME OF LEFT SHOULDER: ICD-10-CM

## 2025-04-24 DIAGNOSIS — M77.8 LEFT SHOULDER TENDONITIS: Primary | ICD-10-CM

## 2025-04-24 DIAGNOSIS — M75.52 BILATERAL SHOULDER BURSITIS: ICD-10-CM

## 2025-04-24 PROBLEM — M25.561 RIGHT KNEE PAIN: Status: ACTIVE | Noted: 2025-04-24

## 2025-04-24 PROBLEM — M17.11 PRIMARY OSTEOARTHRITIS OF RIGHT KNEE: Status: ACTIVE | Noted: 2025-04-24

## 2025-04-24 PROCEDURE — 99213 OFFICE O/P EST LOW 20 MIN: CPT | Performed by: PHYSICIAN ASSISTANT

## 2025-04-24 NOTE — PROGRESS NOTES
Chief Complaint  Follow-up of the Left Shoulder    Subjective          History of Present Illness      Silvia Massey is a 65 y.o. female  presents to [facility] for     Patient presents for follow-up evaluation of left shoulder pain left shoulder stiffness.  She saw Dr. Armijo for her original evaluation of the shoulder on 2025 he gave her left shoulder steroid injection which she states did not help she states the injection made it so she had some increased swelling to the shoulder she felt like she had a cyst on her shoulder before and states its become more solid since the injection.  She also states she still has decreased range of motion and pain with movement to the shoulder.      Allergies   Allergen Reactions    Metformin Diarrhea        Social History     Socioeconomic History    Marital status:    Tobacco Use    Smoking status: Former     Current packs/day: 0.00     Average packs/day: 0.5 packs/day for 10.5 years (5.3 ttl pk-yrs)     Types: Cigarettes     Start date: 1977     Quit date: 10/11/1987     Years since quittin.5     Passive exposure: Past    Smokeless tobacco: Former    Tobacco comments:     25 years ago    Vaping Use    Vaping status: Never Used   Substance and Sexual Activity    Alcohol use: Not Currently     Alcohol/week: 2.0 standard drinks of alcohol     Types: 2 Glasses of wine per week     Comment: occasionally drinks, 1 drink per day, has been drinking for 6-10 yrs,1 beer per night     Drug use: No    Sexual activity: Not Currently     Partners: Male     Birth control/protection: Hysterectomy     Comment: Partcial hysterectomy  one ivary and one tube        REVIEW OF SYSTEMS    Constitutional: Awake alert and oriented x3, no acute distress, denies fevers, chills, weight loss  Respiratory: No respiratory distress  Vascular: Brisk cap refill, Intact distal pulses, No cyanosis, compartments soft with no signs or symptoms of  "compartment syndrome or DVT.   Cardiovascular: Denies chest pain, shortness of breath  Skin: Denies rashes, acute skin changes  Neurologic: Denies headache, loss of consciousness  MSK: Left shoulder pain      Objective   Vital Signs:   /79   Pulse 80   Ht 162.6 cm (64.02\")   Wt 80.7 kg (178 lb)   SpO2 96%   BMI 30.54 kg/m²     Body mass index is 30.54 kg/m².    Physical Exam       Left shoulder: limited range of motion secondary to pain, active forward elevation 90 active abduction 80, palpable cyst to the superior shoulder, positive impingement, mild tenderness to the AC joint, neurovascularly intact, weakness with rotator cuff test, positive pulses.       Procedures    Imaging Results (Most Recent)       None                   Assessment and Plan    Diagnoses and all orders for this visit:    1. Left shoulder tendonitis (Primary)  -     MRI Shoulder Left Without Contrast; Future    2. Impingement syndrome of left shoulder  -     MRI Shoulder Left Without Contrast; Future    3. Bilateral shoulder bursitis  -     MRI Shoulder Left Without Contrast; Future        Discussed diagnosis and treatment options with the patient she was advised we can order MRI of the left shoulder for further evaluation she agreed, follow-up after MRI.    Call or return if worsening symptoms.    Follow Up   Return for After MRI.  Patient was given instructions and counseling regarding her condition or for health maintenance advice. Please see specific information pulled into the AVS if appropriate.       EMR Dragon/Transcription disclaimer:  Part of this note may be an electronic transcription/translation of spoken language to printed text using the Dragon Dictation System  "

## 2025-05-07 RX ORDER — HYDROGEN PEROXIDE 2.65 ML/100ML
81 LIQUID ORAL; TOPICAL DAILY
Qty: 30 TABLET | Refills: 0 | Status: SHIPPED | OUTPATIENT
Start: 2025-05-07

## 2025-05-07 RX ORDER — ERGOCALCIFEROL 1.25 MG/1
50000 CAPSULE, LIQUID FILLED ORAL WEEKLY
Qty: 12 CAPSULE | Refills: 0 | Status: SHIPPED | OUTPATIENT
Start: 2025-05-07

## 2025-05-12 ENCOUNTER — CLINICAL SUPPORT (OUTPATIENT)
Dept: GENETICS | Facility: HOSPITAL | Age: 65
End: 2025-05-12

## 2025-05-12 DIAGNOSIS — Z13.79 GENETIC TESTING: Primary | ICD-10-CM

## 2025-05-12 DIAGNOSIS — Z80.3 FAMILY HISTORY OF BREAST CANCER: ICD-10-CM

## 2025-05-12 DIAGNOSIS — Z80.49 FAMILY HISTORY OF UTERINE CANCER: ICD-10-CM

## 2025-05-12 PROCEDURE — 96041 GENETIC COUNSELING SVC EA 30: CPT | Performed by: GENETIC COUNSELOR, MS

## 2025-05-12 NOTE — PROGRESS NOTES
Silvia Massey, a 65-year-old female, was referred for genetic counseling due to a family history of breast cancer. Ms. Massey has no personal history of cancer. She was 10 years old at menarche and is nulliparous. She went through menopause at age 50 and retains her uterus and left ovary. She had her right ovary and fallopian tube removed at 32 due to an ectopic pregnancy. Ms. Massey's most recent mammogram was three weeks ago and was reportedly normal. She had a colonoscopy in 2023 and reports a history of less than five polyps. She was interested in discussing her risk for a hereditary cancer syndrome. Ms. Massey decided to pursue comprehensive genetic testing to evaluate her risk of cancer, therefore the CancerNext Expanded Panel was ordered through [facility] which analyzes 77 genes associated with an increased cancer risk. Results are expected in 2-3 weeks.      PERTINENT FAMILY HISTORY: (See attached pedigree)   Mother:   Uterine cancer, 52  Sister:   Breast cancer, 63  Pat Half-sister: Breast cancer, 72    We do not have medical records regarding any of these diagnoses.       RISK ASSESSMENT:  Ms. Massey's family history of breast cancer raises the question of a hereditary cancer syndrome. NCCN guidelines for genetic testing for BRCA1/2 states that individuals with a close relative with breast cancer under the age of 50 may consider genetic testing. With both of Ms. Massey's sisters' diagnoses being over 50, she would not meet this criteria. Despite this, genetic testing is still available to her. This risk assessment is based on the family history information provided at the time of the appointment. The assessment could change in the future should new information be obtained.    GENETIC COUNSELING:  We reviewed the family history information in detail. Cases of cancer follow three general patterns: sporadic, familial, and hereditary. While most cancer is sporadic, some cases appear to occur in  family clusters. These cases are said to be familial and account for 10-20% of cancer cases. Familial cases may be due to a combination of shared genes and environmental factors among family members. In even fewer cases, the risk for cancer is inherited, and the genes responsible for the increased cancer risk are known.       Family histories typical of hereditary cancer syndromes usually include multiple first- and second-degree relatives diagnosed with cancer types that define a syndrome. These cases tend to be diagnosed at younger-than-expected ages and can be bilateral or multifocal. The cancer in these families follows an autosomal dominant inheritance pattern, which indicates the likely presence of a mutation in a cancer susceptibility gene. Children and siblings of an individual believed to carry this mutation have a 50% chance of inheriting that mutation, thereby inheriting the increased risk to develop cancer. These mutations can be passed down from the maternal or the paternal lineage.     Due to Ms. Massey's family history of breast cancer, we discussed hereditary breast cancer. Hereditary breast cancer accounts for 5-10% of all cases of breast cancer. A significant proportion (50%) of hereditary breast cancer can be attributed to mutations in the BRCA1 and BRCA2 genes. Mutations in these genes confer an increased risk for breast cancer, ovarian cancer, male breast cancer, prostate cancer, and pancreatic cancer. Women with a BRCA1 or BRCA2 mutation have over a 60% lifetime risk of breast cancer and up to a 58% risk of ovarian cancer. There are other clinically significant breast cancer related genes in addition to BRCA1/2, including PALB2, LINDA, and CHEK2.     Due to her family history of uterine cancer, we discussed Gonzalez syndrome.  Gonzalez syndrome is caused by mutations in mismatch repair genes, (MLH1, MSH2, MSH6, PMS2, and EPCAM). The lifetime risk for colon cancer for individuals with Gonzalez syndrome is  approximately 80% if there is no intervention (i.e. removal of polyps detected on colonoscopy). Other risks include gastric, urinary tract, small intestines, biliary tract, pancreatic, and brain cancer. Women with Gonzalez syndrome also have an elevated risk for ovarian and uterine cancer.    There are other hereditary cancer syndromes as well. Based on Ms. Massey's family history and her desire to get more information regarding her personal risks, testing was pursued through a multigene panel evaluating several other genes known to increase the risk for cancer.    GENETIC TESTING:  The risks, benefits, and limitations of genetic testing and implications for clinical management following testing were reviewed. DNA test results can influence decisions regarding screening and prevention. Genetic testing can have significant psychological implications for both individuals and families. Also discussed was the possibility of employment and insurance discrimination based on genetic test results and the laws in place to prevent this, as well as the limitations of these laws.       We discussed panel testing, which would involve testing 77 genes associated with increased cancer risk. The implications of a positive or negative test result were discussed. We also discussed the importance of testing an affected relative and how a negative result for Ms. Massey wouldn't necessarily mean the cancers presenting in her family weren't due to a genetic mutation that Ms. Massey did not inherit. In general, a negative genetic test result is most informative if a mutation has first been established in an affected member of the family. In cases where an affected individual is not available or interested in testing, it is appropriate to offer testing to an unaffected individual. We discussed the possibility that, in some cases, genetic test results may be ambiguous due to the identification of a genetic variant. These variants may or may  not be associated with an increased cancer risk. With multigene panel testing, it is not uncommon for a variant of uncertain significance (VUS) to be identified. If a VUS is identified, testing family members is typically not recommended and screening recommendations are made based on the family history. The laboratories that perform genetic testing work to reclassify the VUS and send out an amended report if and when a VUS is reclassified. The majority of variant findings are ultimately reclassified to a negative result. Given Ms. Massey's family history, a negative test result does not eliminate all cancer risk, although the risk would not be as high as it would with positive genetic testing.     Total time spent caring for the patient today was 35 minutes. This time includes chart review, time spent during the visit, and time spent after the visit on documentation and follow-up.    PLAN: Genetic testing was ordered via the CancerNext Expanded Panel through [facility]. She had her blood drawn today at [facility]. Results are expected in 2-3 weeks and will be called out to Ms. Massey. If she has any questions in the meantime, she is welcome to call me at [phone].      Cherelle Lamas MS, [facility] [place], [facility]  Licensed Certified Genetic Counselor

## 2025-05-13 ENCOUNTER — HOSPITAL ENCOUNTER (OUTPATIENT)
Dept: MRI IMAGING | Facility: HOSPITAL | Age: 65
Discharge: HOME | End: 2025-05-13
Admitting: PHYSICIAN ASSISTANT
Payer: COMMERCIAL

## 2025-05-13 DIAGNOSIS — M75.51 BILATERAL SHOULDER BURSITIS: ICD-10-CM

## 2025-05-13 DIAGNOSIS — M77.8 LEFT SHOULDER TENDONITIS: ICD-10-CM

## 2025-05-13 DIAGNOSIS — M75.52 BILATERAL SHOULDER BURSITIS: ICD-10-CM

## 2025-05-13 DIAGNOSIS — M75.42 IMPINGEMENT SYNDROME OF LEFT SHOULDER: ICD-10-CM

## 2025-05-13 PROCEDURE — 73221 MRI JOINT UPR EXTREM W/O DYE: CPT

## 2025-05-13 NOTE — TELEPHONE ENCOUNTER
Hub staff attempted to follow warm transfer process and was unsuccessful     Caller: Silvia Massey     Relationship to patient: SELF     Best call back number: [phone]     Patient is needing: PATIENT WOULD LIKE TO RESCHEDULE PROCEDURE ON 01.05.24 WITH DR VERA          
Patient called back to r/s surgery. She r/s to 1-19-24. Spoke with Chanell in surgery scheduling.  
Tried calling pt to r/s surgery, no answer. LMOM    Have cx with in [facility] surgery scheduling.   
radiology results pending

## 2025-05-15 ENCOUNTER — TELEPHONE (OUTPATIENT)
Dept: ORTHOPEDIC SURGERY | Facility: CLINIC | Age: 65
End: 2025-05-15
Payer: COMMERCIAL

## 2025-05-15 NOTE — TELEPHONE ENCOUNTER
LT VM TO INFORM PT OF SCHEDULED APT ON 05/22/2025 245pm ARRIVAL TIME 230pm. OK FOR HUB TO RELAY MESSAGE

## 2025-05-29 ENCOUNTER — TELEPHONE (OUTPATIENT)
Dept: GENETICS | Facility: HOSPITAL | Age: 65
End: 2025-05-29
Payer: COMMERCIAL

## 2025-06-02 ENCOUNTER — SPECIALTY PHARMACY (OUTPATIENT)
Dept: CARDIOLOGY | Facility: CLINIC | Age: 65
End: 2025-06-02
Payer: COMMERCIAL

## 2025-06-02 ENCOUNTER — TELEPHONE (OUTPATIENT)
Dept: GENETICS | Facility: HOSPITAL | Age: 65
End: 2025-06-02
Payer: COMMERCIAL

## 2025-06-02 ENCOUNTER — DOCUMENTATION (OUTPATIENT)
Dept: GENETICS | Facility: HOSPITAL | Age: 65
End: 2025-06-02
Payer: COMMERCIAL

## 2025-06-02 ENCOUNTER — TELEPHONE (OUTPATIENT)
Dept: INTERNAL MEDICINE | Facility: CLINIC | Age: 65
End: 2025-06-02
Payer: COMMERCIAL

## 2025-06-02 ENCOUNTER — TELEPHONE (OUTPATIENT)
Dept: CARDIOLOGY | Facility: CLINIC | Age: 65
End: 2025-06-02

## 2025-06-02 RX ORDER — ROSUVASTATIN CALCIUM 10 MG/1
10 TABLET, COATED ORAL
Qty: 90 TABLET | Refills: 0 | Status: SHIPPED | OUTPATIENT
Start: 2025-06-02

## 2025-06-02 NOTE — TELEPHONE ENCOUNTER
Patient was called   It looks like another provider sent that in.   She is going to call that office

## 2025-06-02 NOTE — TELEPHONE ENCOUNTER
Caller: Silvia Massey    Relationship: Self    Best call back number: [phone]     What form or medical record are you requesting: PA FOR FARXIGA     Who is requesting this form or medical record from you: PHARMACY     How would you like to receive the form or medical records (pick-up, mail, fax): UNSURE WHERE NEEDS TO BE SENT       Timeframe paperwork needed: ASAP    Additional notes: PATIENT IS ALMOST OUT OF MEDICATION

## 2025-06-02 NOTE — TELEPHONE ENCOUNTER
Caller: Silvia Massey    Relationship: Self    Best call back number: [phone]     What was the call regarding: PATIENT STATES SHE HAS BEEN WAITING FOR TWO WEEKS FOR A PRIOR AUTHORIZATION TO BE SUBMITTED FOR FARXIGA. PATIENT STATES THEY WOULD LIKE A STATUS UPDATE ON THE PA BECAUSE THEY ARE RUNNING OUT OF THE MEDICATION.

## 2025-06-02 NOTE — PROGRESS NOTES
Silvia Massey, a 65-year-old female, was referred for genetic counseling due to a family history of breast cancer. Ms. Massey has no personal history of cancer. She was 10 years old at menarche and is nulliparous. She went through menopause at age 50 and retains her uterus and left ovary. She had her right ovary and fallopian tube removed at 32 due to an ectopic pregnancy. Ms. Massey's most recent mammogram was three weeks ago and was reportedly normal. She had a colonoscopy in 2023 and reports a history of less than five polyps. She was interested in discussing her risk for a hereditary cancer syndrome. Ms. Massey decided to pursue comprehensive genetic testing to evaluate her risk of cancer, therefore the [employer] Expanded Panel was ordered through [employer] which analyzes 77 genes associated with an increased cancer risk. Genetic testing was negative for known pathogenic (harmful) mutations in BRCA1/2 and 75 additional genes included on this panel. 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 Variants are changes in DNA that may or may not affect the function of the gene. The classification of a variant to either a benign gene change or pathogenic mutation depends on a number of factors. The majority (estimated to be >90%) of VUS's are eventually reclassified as benign gene changes. It is not recommended that any unaffected relatives be tested for this variant at this time, and management should not be altered based on this variant. Management should be guided by personal and family history assessments. These results were discussed with Ms. Massey by telephone on 6/2/25.     PERTINENT FAMILY HISTORY: (See attached pedigree)   Mother:   Uterine cancer, 52  Sister:   Breast cancer, 63  Mat Half-sister: Breast cancer, 72    We do not have medical records regarding any of these diagnoses.       RISK ASSESSMENT:  Ms.  Bryson's family history of breast cancer raises the question of a hereditary cancer syndrome. NCCN guidelines for genetic testing for BRCA1/2 states that individuals with a close relative with breast cancer under the age of 50 may consider genetic testing. With both of Ms. Massey's sisters' diagnoses being over 50, she would not meet this criteria. Despite this, genetic testing is still available to her. This risk assessment is based on the family history information provided at the time of the appointment. The assessment could change in the future should new information be obtained.    At this time, Ms. Rosaless management should be guided by a family history-based risk assessment. [employer], version 8 is able to take into account personal factors (age at menarche, age at first live birth, breast density, etc) and family history when calculating risk for breast cancer. Computer modeling estimates that Ms. Rosaless lifetime personal risk for developing breast cancer is up to 15.3% (Tyrer-Cuzick, v8), compared to the average female's risk of 12.5%. In general, a lifetime risk above 20% is considered to be “high risk” where increased screening is warranted; Ms. Manning risk does not fall into that category. This risk assessment is based on the family history information provided at the time of the appointment and could change in the future should new information be obtained.    GENETIC COUNSELING:  We reviewed the family history information in detail. Cases of cancer follow three general patterns: sporadic, familial, and hereditary. While most cancer is sporadic, some cases appear to occur in family clusters. These cases are said to be familial and account for 10-20% of cancer cases. Familial cases may be due to a combination of shared genes and environmental factors among family members. In even fewer cases, the risk for cancer is inherited, and the genes responsible for the increased cancer risk are known.        Family histories typical of hereditary cancer syndromes usually include multiple first- and second-degree relatives diagnosed with cancer types that define a syndrome. These cases tend to be diagnosed at younger-than-expected ages and can be bilateral or multifocal. The cancer in these families follows an autosomal dominant inheritance pattern, which indicates the likely presence of a mutation in a cancer susceptibility gene. Children and siblings of an individual believed to carry this mutation have a 50% chance of inheriting that mutation, thereby inheriting the increased risk to develop cancer. These mutations can be passed down from the maternal or the paternal lineage.     Due to Ms. Massey's family history of breast cancer, we discussed hereditary breast cancer. Hereditary breast cancer accounts for 5-10% of all cases of breast cancer. A significant proportion (50%) of hereditary breast cancer can be attributed to mutations in the BRCA1 and BRCA2 genes. Mutations in these genes confer an increased risk for breast cancer, ovarian cancer, male breast cancer, prostate cancer, and pancreatic cancer. Women with a BRCA1 or BRCA2 mutation have over a 60% lifetime risk of breast cancer and up to a 58% risk of ovarian cancer. There are other clinically significant breast cancer related genes in addition to BRCA1/2, including PALB2, LINDA, and CHEK2.     Due to her family history of uterine cancer, we discussed Gonzalez syndrome.  Gonzalez syndrome is caused by mutations in mismatch repair genes, (MLH1, MSH2, MSH6, PMS2, and EPCAM). The lifetime risk for colon cancer for individuals with Gonzalez syndrome is approximately 80% if there is no intervention (i.e. removal of polyps detected on colonoscopy). Other risks include gastric, urinary tract, small intestines, biliary tract, pancreatic, and brain cancer. Women with Gonzalez syndrome also have an elevated risk for ovarian and uterine cancer.    There are other hereditary  cancer syndromes as well. Based on Ms. Massey's family history and her desire to get more information regarding her personal risks, testing was pursued through a multigene panel evaluating several other genes known to increase the risk for cancer.    GENETIC TESTING:  The risks, benefits, and limitations of genetic testing and implications for clinical management following testing were reviewed. DNA test results can influence decisions regarding screening and prevention. Genetic testing can have significant psychological implications for both individuals and families. Also discussed was the possibility of employment and insurance discrimination based on genetic test results and the laws in place to prevent this, as well as the limitations of these laws.       We discussed panel testing, which would involve testing 77 genes associated with increased cancer risk. The implications of a positive or negative test result were discussed. We also discussed the importance of testing an affected relative and how a negative result for Ms. Massey wouldn't necessarily mean the cancers presenting in her family weren't due to a genetic mutation that Ms. Massey did not inherit. In general, a negative genetic test result is most informative if a mutation has first been established in an affected member of the family. In cases where an affected individual is not available or interested in testing, it is appropriate to offer testing to an unaffected individual. We discussed the possibility that, in some cases, genetic test results may be ambiguous due to the identification of a genetic variant. These variants may or may not be associated with an increased cancer risk. With multigene panel testing, it is not uncommon for a variant of uncertain significance (VUS) to be identified. If a VUS is identified, testing family members is typically not recommended and screening recommendations are made based on the family history. The  laboratories that perform genetic testing work to reclassify the VUS and send out an amended report if and when a VUS is reclassified. The majority of variant findings are ultimately reclassified to a negative result. Given Ms. Massey's family history, a negative test result does not eliminate all cancer risk, although the risk would not be as high as it would with positive genetic testing.     TEST RESULTS: Genetic testing was negative for known pathogenic mutations by sequencing and rearrangement testing and RNA analysis for the 77 genes on the [employer] Expanded panel (see attached). 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 If this variant is ever reclassified to a benign variant or a pathogenic mutation, a new report will be issued by the laboratory and released directly to the ordering physician, and our office. This assessment is based on the information provided at the time of the consultation.    CANCER PREVENTION:  Based on computer modeling, Ms. Massey's lifetime risk for breast cancer would not be considered “high risk” (>20%). Per NCCN guidelines, it is appropriate for her to follow general population screening guidelines for her breast cancer risk including annual clinical breast exam and annual mammography. These assessments are based on the information provided at the time of consultation.    PLAN: Genetic counseling remains available to Ms. Massey and her family. If she has any questions, she is welcome to call me at [phone].     Cherelle Lamas MS, [facility], [facility]  Licensed Certified Genetic Counselor      Cc: Silvia Farrell MD

## 2025-06-02 NOTE — TELEPHONE ENCOUNTER
Spoke with patient and disclosed genetic testing results. Informed the patient these results would be listed on her [facility] account and sent to her provider. Full documentation from genetic counselor to follow. Pt would like a copy mailed.

## 2025-06-03 ENCOUNTER — OFFICE VISIT (OUTPATIENT)
Dept: INTERNAL MEDICINE | Facility: CLINIC | Age: 65
End: 2025-06-03
Payer: COMMERCIAL

## 2025-06-03 VITALS
WEIGHT: 178 LBS | HEIGHT: 64 IN | DIASTOLIC BLOOD PRESSURE: 73 MMHG | BODY MASS INDEX: 30.39 KG/M2 | TEMPERATURE: 97.1 F | SYSTOLIC BLOOD PRESSURE: 115 MMHG | OXYGEN SATURATION: 95 % | HEART RATE: 75 BPM

## 2025-06-03 DIAGNOSIS — E11.65 TYPE 2 DIABETES MELLITUS WITH HYPERGLYCEMIA, WITHOUT LONG-TERM CURRENT USE OF INSULIN: ICD-10-CM

## 2025-06-03 DIAGNOSIS — Z12.31 ENCOUNTER FOR SCREENING MAMMOGRAM FOR MALIGNANT NEOPLASM OF BREAST: ICD-10-CM

## 2025-06-03 DIAGNOSIS — I42.8 NICM (NONISCHEMIC CARDIOMYOPATHY): ICD-10-CM

## 2025-06-03 DIAGNOSIS — E78.2 MIXED HYPERLIPIDEMIA: ICD-10-CM

## 2025-06-03 DIAGNOSIS — I10 BENIGN ESSENTIAL HTN: Primary | ICD-10-CM

## 2025-06-03 DIAGNOSIS — M79.606 PAIN OF LOWER EXTREMITY, UNSPECIFIED LATERALITY: ICD-10-CM

## 2025-06-03 DIAGNOSIS — Z23 NEED FOR SHINGLES VACCINE: ICD-10-CM

## 2025-06-03 DIAGNOSIS — Z78.0 POSTMENOPAUSE: ICD-10-CM

## 2025-06-03 PROCEDURE — 99214 OFFICE O/P EST MOD 30 MIN: CPT | Performed by: INTERNAL MEDICINE

## 2025-06-03 RX ORDER — VALSARTAN 160 MG/1
160 TABLET ORAL DAILY
Qty: 90 TABLET | Refills: 3 | Status: SHIPPED | OUTPATIENT
Start: 2025-06-03

## 2025-06-03 NOTE — PROGRESS NOTES
Chief Complaint  Diabetes (Type 2 follow up ) and Hand Pain (Right hand middle finger sore /)    Subjective          Silvia Massey presents to [facility]  History of Present Illness  History of Present Illness  The patient presents for evaluation of right middle finger pain, management of diabetes mellitus and hypertension, and routine health maintenance.    The patient reports pain localized to the right middle finger, which interferes with their ability to perform tasks such as opening objects. They deny symptoms of locking or catching. They have been using diclofenac topical gel (Voltaren), originally prescribed for knee pain, and request a refill.    The patient notes an elevation in their hemoglobin A1c level, which they attribute to recent corticosteroid injections administered in the multiple joints. They describe a recent episode of hyperglycemia, with blood glucose levels peaking at 222 mg/dL following the consumption of pasta salad and chips, subsequently normalizing to 95 mg/dL within 3 to 4 hours.    The patient monitors their blood pressure regularly and adjusts their antihypertensive medication regimen accordingly. For example, they report withholding their medication when morning readings are within normal limits, such as 117/62 mmHg, and typically administer their medication in the evening. They have recently been taking their daytime medication at night due to consistently satisfactory daytime readings, such as 112/65 mmHg, but plan to resume their morning dosing schedule. They report overall well-being but describe a recent episode of generalized fatigue over the weekend, during which they remained in bed. Their current antihypertensive regimen includes valsartan 320 mg, which they self-adjust by halving the dose due to concerns about overdosage. They have been advised to closely monitor their blood pressure due to prior episodes of significant  hypotension.    The patient reports ocular irritation, which has shown improvement over the past two ophthalmology visits, during which no intravitreal injections were required. However, they perceive a slight decline in their condition. They acknowledge recent unhealthy dietary habits and associated weight gain. Genetic testing was performed due to a family history of breast cancer in their sisters, with results indicating no increased genetic risk. Their last mammogram was conducted in April 2025.          Current Outpatient Medications   Medication Instructions    baclofen (LIORESAL) 10 MG tablet TAKE 1 TABLET BY MOUTH THREE TIMES DAILY    carvedilol (COREG) 9.375 mg, Oral, 2 Times Daily    dapagliflozin Propanediol (FARXIGA) 10 mg, Oral, Every Morning    Diclofenac Sodium (VOLTAREN) 4 g, Topical, 4 Times Daily PRN    DULoxetine (CYMBALTA) 40 mg, Daily    Emollient (COLLAGEN EX) Apply  topically.    EQ Aspirin Adult Low Dose 81 mg, Oral, Daily    fluconazole (DIFLUCAN) 150 mg, Oral, Every 3 Days    Magnesium 400 MG capsule Take  by mouth. Taking 420 mg daily    Melatonin 3 MG capsule Take  by mouth.    meloxicam (MOBIC) 15 mg, Oral, Daily    Prasterone (Intrarosa) 6.5 MG insert 1 Application, Vaginal, Nightly    rosuvastatin (CRESTOR) 10 mg, Oral, Every Night at Bedtime    spironolactone (ALDACTONE) 25 mg, Oral, Daily    traZODone (DESYREL) 50 mg, Nightly    triamcinolone (KENALOG) 0.1 % ointment 1 Application, Topical, 2 Times Daily    Trulicity 4.5 mg, Subcutaneous, Weekly    valsartan (DIOVAN) 160 mg, Oral, Daily    vitamin D (ERGOCALCIFEROL) 50,000 Units, Oral, Weekly    zolpidem (AMBIEN) 5 mg       The following portions of the patient's history were reviewed and updated as appropriate: allergies, current medications, past family history, past medical history, past social history, past surgical history, and problem list.    Objective   Vital Signs:   /73 (BP Location: Left arm, Patient Position:  "Sitting, Cuff Size: Adult)   Pulse 75   Temp 97.1 °F (36.2 °C) (Temporal)   Ht 162.6 cm (64\")   Wt 80.7 kg (178 lb)   SpO2 95%   BMI 30.55 kg/m²     BP Readings from Last 3 Encounters:   06/03/25 115/73   04/24/25 136/79   01/31/25 115/74     Wt Readings from Last 3 Encounters:   06/03/25 80.7 kg (178 lb)   05/13/25 80.7 kg (178 lb)   04/24/25 80.7 kg (178 lb)     Physical Exam   Appearance: No acute distress, well-nourished  Head: normocephalic, atraumatic  Eyes: extraocular movements intact, no scleral icterus, no conjunctival injection  Ears, Nose, and Throat: external ears normal, nares patent, moist mucous membranes  Cardiovascular: regular rate and rhythm. no murmurs, rubs, or gallops. no edema  Respiratory: breathing comfortably, symmetric chest rise, clear to auscultation bilaterally. No wheezes, rales, or rhonchi.  Neuro: alert and oriented to time, place, and person. Normal gait  Psych: normal mood and affect     Result Review :   The following data was reviewed by: Louie Jang Jr, MD on 06/03/2025:  Common labs          8/30/2024    09:53 1/23/2025    06:17 3/11/2025    07:34 3/11/2025    10:28   Common Labs   Glucose 110  137  139     BUN 17  12  19     Creatinine 0.86  0.93  1.05     Sodium 141  143  137     Potassium 4.3  4.2  4.9     Chloride 104  105  95     Calcium 10.3  10.0  10.7     Albumin 4.8   4.4     Total Bilirubin 0.3   0.6     Alkaline Phosphatase 80   63     AST (SGOT) 20   18     ALT (SGPT) 21   35     WBC 5.25   6.33     Hemoglobin 14.7   15.3     Hematocrit 46.5   46.2     Platelets 229   284     Total Cholesterol 145   143     Triglycerides 133   97     HDL Cholesterol 64   81     LDL Cholesterol  58   44     Hemoglobin A1C 6.30   7.60     Microalbumin, Urine    1.5        Lab Results   Component Value Date    INR 1.01 02/13/2024    BILIRUBINUR Negative 07/02/2024          Assessment and Plan    Diagnoses and all orders for this visit:    1. Benign essential HTN " (Primary)  Comments:  Well-controlled and possibly overmedicated given inconsistency of regimen.  Discussed morning and evening regimen.  Reduce valsartan to 160.  Orders:  -     valsartan (DIOVAN) 160 MG tablet; Take 1 tablet by mouth Daily.  Dispense: 90 tablet; Refill: 3    2. Encounter for screening mammogram for malignant neoplasm of breast  -     Mammo Screening Digital Tomosynthesis Bilateral With CAD; Future    3. Postmenopause  -     DEXA Bone Density Axial; Future    4. Pain of lower extremity, unspecified laterality  -     Diclofenac Sodium (VOLTAREN) 1 % gel gel; Apply 4 g topically to the appropriate area as directed 4 (Four) Times a Day As Needed (pain).  Dispense: 350 g; Refill: 1    5. Mixed hyperlipidemia  Comments:  Lipids reviewed and at goal LDL less than 55.    6. Type 2 diabetes mellitus with hyperglycemia, without long-term current use of insulin  Comments:  Reasonable control.  Encouraged low-carb/high-protein food intake.    7. Nonischemic cardiomyopathy  Comments:  cont BB, ARB, MRA, and SGLT-2  Overview:  Entresto possible allergic reaction with hives    Orders:  -     valsartan (DIOVAN) 160 MG tablet; Take 1 tablet by mouth Daily.  Dispense: 90 tablet; Refill: 3    8. Need for shingles vaccine          Medications Discontinued During This Encounter   Medication Reason    Zoryve 0.3 % foam *Therapy completed    Diclofenac Sodium (VOLTAREN) 1 % gel gel Reorder    valsartan (DIOVAN) 320 MG tablet           Follow Up   No follow-ups on file.  Patient was given instructions and counseling regarding her condition or for health maintenance advice. Please see specific information pulled into the AVS if appropriate.       Louie Jang Jr, MD  06/03/25  09:01 EDT

## 2025-06-04 LAB
ARTICHOKE IGE QN: 45 MG/DL (ref 0–100)
CREATININE: 0.79
HBA1C MFR BLD: 7.3 % (ref 4.8–5.6)
HDLC SERPL-MCNC: 72 MG/DL (ref 40–60)
HGB BLD-MCNC: 14.2 G/DL

## 2025-06-05 ENCOUNTER — OFFICE VISIT (OUTPATIENT)
Dept: ORTHOPEDIC SURGERY | Facility: CLINIC | Age: 65
End: 2025-06-05
Payer: COMMERCIAL

## 2025-06-05 VITALS
DIASTOLIC BLOOD PRESSURE: 74 MMHG | SYSTOLIC BLOOD PRESSURE: 124 MMHG | HEART RATE: 85 BPM | OXYGEN SATURATION: 94 % | HEIGHT: 64 IN | WEIGHT: 175 LBS | BODY MASS INDEX: 29.88 KG/M2

## 2025-06-05 DIAGNOSIS — M75.42 IMPINGEMENT SYNDROME OF LEFT SHOULDER: ICD-10-CM

## 2025-06-05 DIAGNOSIS — M77.8 LEFT SHOULDER TENDONITIS: ICD-10-CM

## 2025-06-05 DIAGNOSIS — S43.432A SUPERIOR GLENOID LABRUM LESION OF LEFT SHOULDER, INITIAL ENCOUNTER: Primary | ICD-10-CM

## 2025-06-05 NOTE — PROGRESS NOTES
Chief Complaint  Follow-up of the Left Shoulder    Subjective          History of Present Illness      Silvia Massey is a 65 y.o. female  presents to [facility] for     Patient presents for follow-up evaluation of left shoulder stiffness left shoulder pain and to review left shoulder MRI.  She has tried injection with Dr. Aleksander echeverria in January with no relief.  She had concern for a cyst that was forming on her lateral shoulder and we ordered MRI for this as well as due to her pain and decreased range of motion.  She states that she has done therapy in the past but has not done it for a while.  She takes ibuprofen as needed for pain.  She states her pain is only present with movement she states that the whole shoulder causes her pain with movement.      Allergies   Allergen Reactions    Metformin Diarrhea        Social History     Socioeconomic History    Marital status:    Tobacco Use    Smoking status: Former     Current packs/day: 0.00     Average packs/day: 0.5 packs/day for 10.5 years (5.3 ttl pk-yrs)     Types: Cigarettes     Start date: 1977     Quit date: 10/11/1987     Years since quittin.6     Passive exposure: Past    Smokeless tobacco: Former    Tobacco comments:     25 years ago    Vaping Use    Vaping status: Never Used   Substance and Sexual Activity    Alcohol use: Not Currently     Alcohol/week: 2.0 standard drinks of alcohol     Types: 2 Glasses of wine per week     Comment: occasionally drinks, 1 drink per day, has been drinking for 6-10 yrs,1 beer per night     Drug use: No    Sexual activity: Not Currently     Partners: Male     Birth control/protection: Hysterectomy     Comment: Partcial hysterectomy  one ivary and one tube        REVIEW OF SYSTEMS    Constitutional: Awake alert and oriented x3, no acute distress, denies fevers, chills, weight loss  Respiratory: No respiratory distress  Vascular: Brisk cap refill, Intact distal pulses, No  "cyanosis, compartments soft with no signs or symptoms of compartment syndrome or DVT.   Cardiovascular: Denies chest pain, shortness of breath  Skin: Denies rashes, acute skin changes  Neurologic: Denies headache, loss of consciousness  MSK: Left shoulder pain      Objective   Vital Signs:   /74   Pulse 85   Ht 162.6 cm (64.02\")   Wt 79.4 kg (175 lb)   SpO2 94%   BMI 30.02 kg/m²     Body mass index is 30.02 kg/m².    Physical Exam       Left shoulder: limited range of motion secondary to pain, active forward elevation 90 active abduction 80, palpable cyst to the superior shoulder, positive impingement, mild tenderness to the AC joint, neurovascularly intact, weakness with rotator cuff test, positive pulses.       Procedures    Imaging Results (Most Recent)       None           MRI Shoulder Left Without Contrast  Result Date: 5/15/2025  Narrative: MRI SHOULDER LEFT WO CONTRAST Date of Exam: 5/13/2025 7:05 AM EDT Indication: left shoulder pain with swelling and decreased range of motion.  Comparison: None available. Technique:  Routine multiplanar/multisequence images of the left shoulder were obtained without contrast administration.  Findings: Changes of tendinopathy are seen throughout the rotator cuff. There is no evidence for partial or full-thickness rotator cuff tear. The biceps anchor is intact. There is thickening and abnormal signal in the proximal intra-articular biceps tendon suggesting changes of tendinopathy. There may be partial thickness tear. There is no complete disruption or distal retraction. The tendon identified in the expected position in the intertubercular sulcus. There is abnormal signal involving the superior labrum extending into the posterior superior labrum. The findings suggest changes of a SLAP type III or possibly a SLAP type IV tear given the signal abnormalities involve the proximal biceps tendon. The glenohumeral joint is intact. Mild changes of osteoarthritis are noted. " There is no joint effusion. No subacromial subdeltoid bursal fluid collection is seen. The acromioclavicular joint is intact. Mild hypertrophic age-related changes are noted. No abnormal bone marrow signal is otherwise seen. The cortical margins are grossly intact. The volume of the rotator cuff musculature is within normal limits. There is evidence for a fat signal focus within perceptible capsule in the subcutaneous soft tissues overlying the posterior lateral margin of the shoulder. This finding is noted in the region of interest as indicated by the capsule marker on the overlying skin surface. The finding suggest a small lipoma measuring 3.4 x 1.1 x 2.8 cm in transverse by craniocaudal by AP dimensions.     Impression: Impression: 1.Changes of tendinopathy are seen throughout the rotator cuff. There is no evidence for partial or full-thickness rotator cuff tear. 2.Evidence for tendinopathy and possible partial thickness tear of the proximal intra-articular biceps tendon. There is no complete disruption or distal retraction. 3.Evidence for a SLAP type III or possibly a SLAP type IV tear. 4.Mild osteoarthritis of the glenohumeral joint. Mild age-related changes of the acromioclavicular joint are noted. 5.Findings suggesting a small lipoma in the subcutaneous soft tissues overlying the posterior lateral margin of the shoulder. Electronically Signed: Jeevan Higgins MD  5/15/2025 12:19 PM EDT  Workstation ID: [id number]            Assessment and Plan    Diagnoses and all orders for this visit:    1. Superior glenoid labrum lesion of left shoulder, initial encounter (Primary)  -     Ambulatory Referral to Physical Therapy for Evaluation & Treatment    2. Left shoulder tendonitis  -     Ambulatory Referral to Physical Therapy for Evaluation & Treatment    3. Impingement syndrome of left shoulder  -     Ambulatory Referral to Physical Therapy for Evaluation & Treatment        Reviewed MRI with the patient discussed risk  benefits procedure recovery of left shoulder arthroscopic surgery versus conservative treatment.  Patient would like to continue conservative treatment, she was advised she could benefit from physical therapy she was given a new order for this.  She will continue ibuprofen, follow-up in 8 weeks for recheck.    Discussed surgery., Risks/benefits discussed with patient including, but not limited to: infection, bleeding, neurovascular damage, re-rupture, aesthetic deformity, need for further surgery, and death., and Call or return if worsening symptoms.    Follow Up   Return in about 8 weeks (around 7/31/2025).  Patient was given instructions and counseling regarding her condition or for health maintenance advice. Please see specific information pulled into the AVS if appropriate.       EMR Dragon/Transcription disclaimer:  Part of this note may be an electronic transcription/translation of spoken language to printed text using the Dragon Dictation System

## 2025-06-11 RX ORDER — HYDROGEN PEROXIDE 2.65 ML/100ML
81 LIQUID ORAL; TOPICAL DAILY
Qty: 30 TABLET | Refills: 0 | Status: SHIPPED | OUTPATIENT
Start: 2025-06-11

## 2025-06-16 NOTE — TELEPHONE ENCOUNTER
Caller: Silvia Massey NANCIE    Relationship: Self    Best call back number: [phone]    Requested Prescriptions:   Requested Prescriptions     Pending Prescriptions Disp Refills    Dulaglutide (Trulicity) 4.5 MG/0.5ML solution auto-injector 6 mL 3     Sig: Inject 4.5 mg under the skin into the appropriate area as directed 1 (One) Time Per Week.        Pharmacy where request should be sent: [address] [phone] [facility] [phone]      Last office visit with prescribing clinician: 6/3/2025   Last telemedicine visit with prescribing clinician: Visit date not found   Next office visit with prescribing clinician: 12/4/2025     Additional details provided by patient: PATIENT CURRENTLY HAS 1 LEFT. SHE WOULD LIKE FOR THIS TO BE SENT AS 90 DAYS AT A TIME.     Does the patient have less than a 3 day supply:  [x] Yes  [] No    Would you like a call back once the refill request has been completed: [] Yes [] No    If the office needs to give you a call back, can they leave a voicemail: [] Yes [] No    Joceline Carvajal Rep   06/16/25 09:28 EDT

## 2025-06-27 ENCOUNTER — HOSPITAL ENCOUNTER (OUTPATIENT)
Dept: BONE DENSITY | Facility: HOSPITAL | Age: 65
Discharge: HOME | End: 2025-06-27
Admitting: INTERNAL MEDICINE
Payer: COMMERCIAL

## 2025-06-27 DIAGNOSIS — Z78.0 POSTMENOPAUSE: ICD-10-CM

## 2025-06-27 PROCEDURE — 77080 DXA BONE DENSITY AXIAL: CPT

## 2025-07-23 RX ORDER — HYDROGEN PEROXIDE 2.65 ML/100ML
81 LIQUID ORAL; TOPICAL DAILY
Qty: 30 TABLET | Refills: 0 | Status: SHIPPED | OUTPATIENT
Start: 2025-07-23

## 2025-07-28 RX ORDER — ERGOCALCIFEROL 1.25 MG/1
50000 CAPSULE, LIQUID FILLED ORAL WEEKLY
Qty: 12 CAPSULE | Refills: 0 | Status: SHIPPED | OUTPATIENT
Start: 2025-07-28

## 2025-08-12 ENCOUNTER — TELEPHONE (OUTPATIENT)
Dept: INTERNAL MEDICINE | Facility: CLINIC | Age: 65
End: 2025-08-12

## 2025-08-12 ENCOUNTER — OFFICE VISIT (OUTPATIENT)
Dept: INTERNAL MEDICINE | Facility: CLINIC | Age: 65
End: 2025-08-12
Payer: COMMERCIAL

## 2025-08-12 VITALS
BODY MASS INDEX: 30.39 KG/M2 | OXYGEN SATURATION: 98 % | TEMPERATURE: 96.3 F | HEART RATE: 73 BPM | WEIGHT: 178 LBS | SYSTOLIC BLOOD PRESSURE: 117 MMHG | HEIGHT: 64 IN | DIASTOLIC BLOOD PRESSURE: 69 MMHG

## 2025-08-12 DIAGNOSIS — I10 BENIGN ESSENTIAL HTN: ICD-10-CM

## 2025-08-12 DIAGNOSIS — M19.90 ARTHRITIS: ICD-10-CM

## 2025-08-12 DIAGNOSIS — E78.2 MIXED HYPERLIPIDEMIA: ICD-10-CM

## 2025-08-12 DIAGNOSIS — R30.0 DYSURIA: ICD-10-CM

## 2025-08-12 DIAGNOSIS — E55.9 VITAMIN D DEFICIENCY: ICD-10-CM

## 2025-08-12 DIAGNOSIS — M51.362 DEGENERATION OF INTERVERTEBRAL DISC OF LUMBAR REGION WITH DISCOGENIC BACK PAIN AND LOWER EXTREMITY PAIN: ICD-10-CM

## 2025-08-12 DIAGNOSIS — E11.65 TYPE 2 DIABETES MELLITUS WITH HYPERGLYCEMIA, WITHOUT LONG-TERM CURRENT USE OF INSULIN: Primary | ICD-10-CM

## 2025-08-12 LAB
25(OH)D3 SERPL-MCNC: 55.5 NG/ML (ref 30–100)
ALBUMIN SERPL-MCNC: 4.8 G/DL (ref 3.5–5.2)
ALBUMIN/GLOB SERPL: 1.8 G/DL
ALP SERPL-CCNC: 80 U/L (ref 39–117)
ALT SERPL W P-5'-P-CCNC: 16 U/L (ref 1–33)
ANION GAP SERPL CALCULATED.3IONS-SCNC: 10.1 MMOL/L (ref 5–15)
AST SERPL-CCNC: 16 U/L (ref 1–32)
BACTERIA UR QL AUTO: ABNORMAL /HPF
BASOPHILS # BLD AUTO: 0.1 10*3/MM3 (ref 0–0.2)
BASOPHILS NFR BLD AUTO: 1.7 % (ref 0–1.5)
BILIRUB BLD-MCNC: NEGATIVE MG/DL
BILIRUB SERPL-MCNC: 0.3 MG/DL (ref 0–1.2)
BILIRUB UR QL STRIP: NEGATIVE
BUN SERPL-MCNC: 17 MG/DL (ref 8–23)
BUN/CREAT SERPL: 18.5 (ref 7–25)
CALCIUM SPEC-SCNC: 10.6 MG/DL (ref 8.6–10.5)
CHLORIDE SERPL-SCNC: 102 MMOL/L (ref 98–107)
CHOLEST SERPL-MCNC: 127 MG/DL (ref 0–200)
CLARITY UR: CLEAR
CLARITY, POC: CLEAR
CO2 SERPL-SCNC: 26.9 MMOL/L (ref 22–29)
COLOR UR: YELLOW
COLOR UR: YELLOW
CREAT SERPL-MCNC: 0.92 MG/DL (ref 0.57–1)
DEPRECATED RDW RBC AUTO: 39.8 FL (ref 37–54)
EGFRCR SERPLBLD CKD-EPI 2021: 69.2 ML/MIN/1.73
EOSINOPHIL # BLD AUTO: 0.11 10*3/MM3 (ref 0–0.4)
EOSINOPHIL NFR BLD AUTO: 1.9 % (ref 0.3–6.2)
ERYTHROCYTE [DISTWIDTH] IN BLOOD BY AUTOMATED COUNT: 12.9 % (ref 12.3–15.4)
EXPIRATION DATE: 0
GLOBULIN UR ELPH-MCNC: 2.7 GM/DL
GLUCOSE SERPL-MCNC: 84 MG/DL (ref 65–99)
GLUCOSE UR STRIP-MCNC: ABNORMAL MG/DL
GLUCOSE UR STRIP-MCNC: ABNORMAL MG/DL
HBA1C MFR BLD: 6.8 % (ref 4.8–5.6)
HCT VFR BLD AUTO: 44.8 % (ref 34–46.6)
HDLC SERPL-MCNC: 67 MG/DL (ref 40–60)
HGB BLD-MCNC: 14.5 G/DL (ref 12–15.9)
HGB UR QL STRIP.AUTO: NEGATIVE
HYALINE CASTS UR QL AUTO: ABNORMAL /LPF
IMM GRANULOCYTES # BLD AUTO: 0.02 10*3/MM3 (ref 0–0.05)
IMM GRANULOCYTES NFR BLD AUTO: 0.3 % (ref 0–0.5)
KETONES UR QL STRIP: NEGATIVE
KETONES UR QL: NEGATIVE
LDLC SERPL CALC-MCNC: 45 MG/DL (ref 0–100)
LDLC/HDLC SERPL: 0.67 {RATIO}
LEUKOCYTE EST, POC: NEGATIVE
LEUKOCYTE ESTERASE UR QL STRIP.AUTO: NEGATIVE
LYMPHOCYTES # BLD AUTO: 2.28 10*3/MM3 (ref 0.7–3.1)
LYMPHOCYTES NFR BLD AUTO: 38.5 % (ref 19.6–45.3)
Lab: 0
MCH RBC QN AUTO: 27.5 PG (ref 26.6–33)
MCHC RBC AUTO-ENTMCNC: 32.4 G/DL (ref 31.5–35.7)
MCV RBC AUTO: 85 FL (ref 79–97)
MONOCYTES # BLD AUTO: 0.39 10*3/MM3 (ref 0.1–0.9)
MONOCYTES NFR BLD AUTO: 6.6 % (ref 5–12)
NEUTROPHILS NFR BLD AUTO: 3.02 10*3/MM3 (ref 1.7–7)
NEUTROPHILS NFR BLD AUTO: 51 % (ref 42.7–76)
NITRITE UR QL STRIP: NEGATIVE
NITRITE UR-MCNC: NEGATIVE MG/ML
NRBC BLD AUTO-RTO: 0 /100 WBC (ref 0–0.2)
PH UR STRIP.AUTO: 6.5 [PH] (ref 5–8)
PH UR: 6 [PH] (ref 5–8)
PLATELET # BLD AUTO: 235 10*3/MM3 (ref 140–450)
PMV BLD AUTO: 10.4 FL (ref 6–12)
POTASSIUM SERPL-SCNC: 4.6 MMOL/L (ref 3.5–5.2)
PROT SERPL-MCNC: 7.5 G/DL (ref 6–8.5)
PROT UR QL STRIP: NEGATIVE
PROT UR STRIP-MCNC: NEGATIVE MG/DL
RBC # BLD AUTO: 5.27 10*6/MM3 (ref 3.77–5.28)
RBC # UR STRIP: ABNORMAL /HPF
RBC # UR STRIP: NEGATIVE /UL
REF LAB TEST METHOD: ABNORMAL
SODIUM SERPL-SCNC: 139 MMOL/L (ref 136–145)
SP GR UR STRIP: 1.03 (ref 1–1.03)
SP GR UR: 1.02 (ref 1–1.03)
SQUAMOUS #/AREA URNS HPF: ABNORMAL /HPF
TRIGL SERPL-MCNC: 74 MG/DL (ref 0–150)
UROBILINOGEN UR QL STRIP: ABNORMAL
UROBILINOGEN UR QL: NORMAL
VLDLC SERPL-MCNC: 15 MG/DL (ref 5–40)
WBC # UR STRIP: ABNORMAL /HPF
WBC NRBC COR # BLD AUTO: 5.92 10*3/MM3 (ref 3.4–10.8)

## 2025-08-12 PROCEDURE — 87086 URINE CULTURE/COLONY COUNT: CPT | Performed by: INTERNAL MEDICINE

## 2025-08-12 PROCEDURE — 80061 LIPID PANEL: CPT | Performed by: INTERNAL MEDICINE

## 2025-08-12 PROCEDURE — 87186 SC STD MICRODIL/AGAR DIL: CPT | Performed by: INTERNAL MEDICINE

## 2025-08-12 PROCEDURE — 82306 VITAMIN D 25 HYDROXY: CPT | Performed by: INTERNAL MEDICINE

## 2025-08-12 PROCEDURE — 83036 HEMOGLOBIN GLYCOSYLATED A1C: CPT | Performed by: INTERNAL MEDICINE

## 2025-08-12 PROCEDURE — 81001 URINALYSIS AUTO W/SCOPE: CPT | Performed by: INTERNAL MEDICINE

## 2025-08-12 PROCEDURE — 87077 CULTURE AEROBIC IDENTIFY: CPT | Performed by: INTERNAL MEDICINE

## 2025-08-12 PROCEDURE — 80053 COMPREHEN METABOLIC PANEL: CPT | Performed by: INTERNAL MEDICINE

## 2025-08-12 PROCEDURE — 85025 COMPLETE CBC W/AUTO DIFF WBC: CPT | Performed by: INTERNAL MEDICINE

## 2025-08-12 RX ORDER — SEMAGLUTIDE 1.34 MG/ML
1 INJECTION, SOLUTION SUBCUTANEOUS WEEKLY
Qty: 3 ML | Refills: 0 | Status: SHIPPED | OUTPATIENT
Start: 2025-08-12

## 2025-08-12 RX ORDER — COLCHICINE 0.6 MG/1
0.6 TABLET ORAL DAILY PRN
Qty: 90 TABLET | Refills: 1 | Status: SHIPPED | OUTPATIENT
Start: 2025-08-12

## 2025-08-13 ENCOUNTER — RESULTS FOLLOW-UP (OUTPATIENT)
Dept: INTERNAL MEDICINE | Facility: CLINIC | Age: 65
End: 2025-08-13
Payer: COMMERCIAL

## 2025-08-13 ENCOUNTER — PRIOR AUTHORIZATION (OUTPATIENT)
Dept: INTERNAL MEDICINE | Facility: CLINIC | Age: 65
End: 2025-08-13
Payer: COMMERCIAL

## 2025-08-13 RX ORDER — SULFAMETHOXAZOLE AND TRIMETHOPRIM 800; 160 MG/1; MG/1
1 TABLET ORAL 2 TIMES DAILY
Qty: 10 TABLET | Refills: 0 | Status: SHIPPED | OUTPATIENT
Start: 2025-08-13 | End: 2025-08-18

## 2025-08-13 RX ORDER — FLUCONAZOLE 150 MG/1
150 TABLET ORAL
Qty: 6 TABLET | Refills: 0 | Status: SHIPPED | OUTPATIENT
Start: 2025-08-13

## 2025-08-14 DIAGNOSIS — I42.8 NICM (NONISCHEMIC CARDIOMYOPATHY): ICD-10-CM

## 2025-08-14 LAB — BACTERIA SPEC AEROBE CULT: ABNORMAL

## 2025-08-14 RX ORDER — SPIRONOLACTONE 25 MG/1
25 TABLET ORAL DAILY
Qty: 90 TABLET | Refills: 0 | OUTPATIENT
Start: 2025-08-14

## 2025-08-15 ENCOUNTER — TELEPHONE (OUTPATIENT)
Dept: INTERNAL MEDICINE | Facility: CLINIC | Age: 65
End: 2025-08-15
Payer: COMMERCIAL

## 2025-08-15 RX ORDER — SEMAGLUTIDE 1.34 MG/ML
0.25 INJECTION, SOLUTION SUBCUTANEOUS WEEKLY
Qty: 1.5 ML | Refills: 0 | Status: SHIPPED | OUTPATIENT
Start: 2025-08-15

## 2025-08-19 ENCOUNTER — HOSPITAL ENCOUNTER (OUTPATIENT)
Facility: HOSPITAL | Age: 65
Discharge: HOME | End: 2025-08-19
Admitting: INTERNAL MEDICINE
Payer: COMMERCIAL

## 2025-08-19 DIAGNOSIS — I42.8 NICM (NONISCHEMIC CARDIOMYOPATHY): ICD-10-CM

## 2025-08-19 PROCEDURE — 93306 TTE W/DOPPLER COMPLETE: CPT

## 2025-08-22 ENCOUNTER — RESULTS FOLLOW-UP (OUTPATIENT)
Dept: CARDIOLOGY | Facility: CLINIC | Age: 65
End: 2025-08-22
Payer: COMMERCIAL

## 2025-08-22 DIAGNOSIS — I42.8 NICM (NONISCHEMIC CARDIOMYOPATHY): Primary | ICD-10-CM

## 2025-08-22 LAB
AORTIC DIMENSIONLESS INDEX: 0.67 (DI)
ASCENDING AORTA: 2.5 CM
AV MEAN PRESS GRAD SYS DOP V1V2: 3 MMHG
AV VMAX SYS DOP: 124 CM/SEC
BH CV ECHO MEAS - AO MAX PG: 6.2 MMHG
BH CV ECHO MEAS - AO ROOT DIAM: 3.2 CM
BH CV ECHO MEAS - AO V2 VTI: 24.5 CM
BH CV ECHO MEAS - AVA(I,D): 2.12 CM2
BH CV ECHO MEAS - EDV(CUBED): 91.1 ML
BH CV ECHO MEAS - EDV(MOD-SP2): 90.9 ML
BH CV ECHO MEAS - EDV(MOD-SP4): 79.1 ML
BH CV ECHO MEAS - EF(MOD-SP2): 36.4 %
BH CV ECHO MEAS - EF(MOD-SP4): 48.5 %
BH CV ECHO MEAS - ESV(CUBED): 42.9 ML
BH CV ECHO MEAS - ESV(MOD-SP2): 57.8 ML
BH CV ECHO MEAS - ESV(MOD-SP4): 40.7 ML
BH CV ECHO MEAS - FS: 22.2 %
BH CV ECHO MEAS - IVS/LVPW: 0.75 CM
BH CV ECHO MEAS - IVSD: 0.9 CM
BH CV ECHO MEAS - LA DIMENSION: 3.8 CM
BH CV ECHO MEAS - LAT PEAK E' VEL: 6.9 CM/SEC
BH CV ECHO MEAS - LV DIASTOLIC VOL/BSA (35-75): 42.9 CM2
BH CV ECHO MEAS - LV MASS(C)D: 164 GRAMS
BH CV ECHO MEAS - LV MAX PG: 4.2 MMHG
BH CV ECHO MEAS - LV MEAN PG: 2 MMHG
BH CV ECHO MEAS - LV SYSTOLIC VOL/BSA (12-30): 22.1 CM2
BH CV ECHO MEAS - LV V1 MAX: 103 CM/SEC
BH CV ECHO MEAS - LV V1 VTI: 16.5 CM
BH CV ECHO MEAS - LVIDD: 4.5 CM
BH CV ECHO MEAS - LVIDS: 3.5 CM
BH CV ECHO MEAS - LVOT AREA: 3.1 CM2
BH CV ECHO MEAS - LVOT DIAM: 2 CM
BH CV ECHO MEAS - LVPWD: 1.2 CM
BH CV ECHO MEAS - MED PEAK E' VEL: 6.3 CM/SEC
BH CV ECHO MEAS - MV A MAX VEL: 98 CM/SEC
BH CV ECHO MEAS - MV DEC SLOPE: 791 CM/SEC2
BH CV ECHO MEAS - MV DEC TIME: 0.21 SEC
BH CV ECHO MEAS - MV E MAX VEL: 58.7 CM/SEC
BH CV ECHO MEAS - MV E/A: 0.6
BH CV ECHO MEAS - MV MEAN PG: 3 MMHG
BH CV ECHO MEAS - MV P1/2T: 35.6 MSEC
BH CV ECHO MEAS - MV V2 VTI: 23.3 CM
BH CV ECHO MEAS - MVA(P1/2T): 6.2 CM2
BH CV ECHO MEAS - MVA(VTI): 2.22 CM2
BH CV ECHO MEAS - PA V2 MAX: 79.7 CM/SEC
BH CV ECHO MEAS - PULM A REVS DUR: 0.16 SEC
BH CV ECHO MEAS - PULM A REVS VEL: 29.5 CM/SEC
BH CV ECHO MEAS - PULM DIAS VEL: 46.4 CM/SEC
BH CV ECHO MEAS - PULM S/D: 1
BH CV ECHO MEAS - PULM SYS VEL: 46.4 CM/SEC
BH CV ECHO MEAS - RV MAX PG: 1.14 MMHG
BH CV ECHO MEAS - RV V1 MAX: 53.5 CM/SEC
BH CV ECHO MEAS - RV V1 VTI: 11.2 CM
BH CV ECHO MEAS - RVDD: 3.7 CM
BH CV ECHO MEAS - SV(LVOT): 51.8 ML
BH CV ECHO MEAS - SV(MOD-SP2): 33.1 ML
BH CV ECHO MEAS - SV(MOD-SP4): 38.4 ML
BH CV ECHO MEAS - SVI(LVOT): 28.1 ML/M2
BH CV ECHO MEAS - SVI(MOD-SP2): 18 ML/M2
BH CV ECHO MEAS - SVI(MOD-SP4): 20.8 ML/M2
BH CV ECHO MEAS - TAPSE (>1.6): 1.88 CM
BH CV ECHO MEASUREMENTS AVERAGE E/E' RATIO: 8.89
BH CV XLRA - TDI S': 13 CM/SEC
LEFT ATRIUM VOLUME INDEX: 27.4 ML/M2
LV EF BIPLANE MOD: 44.2 %
SINUS: 2.8 CM
STJ: 2.3 CM

## 2025-08-25 RX ORDER — ROSUVASTATIN CALCIUM 10 MG/1
10 TABLET, COATED ORAL
Qty: 90 TABLET | Refills: 0 | Status: SHIPPED | OUTPATIENT
Start: 2025-08-25

## 2025-08-29 ENCOUNTER — HOSPITAL ENCOUNTER (OUTPATIENT)
Facility: HOSPITAL | Age: 65
Discharge: HOME | End: 2025-08-29
Admitting: NURSE PRACTITIONER
Payer: COMMERCIAL

## 2025-08-29 DIAGNOSIS — I42.8 NICM (NONISCHEMIC CARDIOMYOPATHY): ICD-10-CM

## 2025-08-29 PROCEDURE — 25010000002 SULFUR HEXAFLUORIDE MICROSPH 60.7-25 MG RECONSTITUTED SUSPENSION: Performed by: INTERNAL MEDICINE

## 2025-08-29 PROCEDURE — 93308 TTE F-UP OR LMTD: CPT

## 2025-08-29 RX ADMIN — SULFUR HEXAFLUORIDE 5 ML: KIT at 09:13

## (undated) DEVICE — TUBING, SUCTION, 1/4" X 10', STRAIGHT: Brand: MEDLINE

## (undated) DEVICE — Device

## (undated) DEVICE — LN SMPL O2 NASL/ORL SMART/CAPNOLINE PLS A/

## (undated) DEVICE — TRAP,MUCUS SPECIMEN, 80CC: Brand: MEDLINE

## (undated) DEVICE — GW GLIDEWIRE STD ANGL .035IN 3X180CM

## (undated) DEVICE — SOL IRRG H2O PL/BG 1000ML STRL

## (undated) DEVICE — ADAPT SWVL FIBROPTIC BRONCH

## (undated) DEVICE — SINGLE USE SUCTION VALVE MAJ-209: Brand: SINGLE USE SUCTION VALVE (STERILE)

## (undated) DEVICE — SOL IRR H2O BTL 1000ML STRL

## (undated) DEVICE — VITAL SIGNS™ JACKSON-REES CIRCUITS: Brand: VITAL SIGNS™

## (undated) DEVICE — LINER SURG CANSTR SXN S/RIGD 1500CC

## (undated) DEVICE — FRCP BX RADJAW4 GASTRO PED 1.8X160X2

## (undated) DEVICE — CATH CORNRY OPTITORQUE 1SH TR4 5F 110

## (undated) DEVICE — GW FC FLOP/TP .035 260CM 3MM

## (undated) DEVICE — SOLIDIFIER LIQLOC PLS 1500CC BT

## (undated) DEVICE — DEV COMPR RAD TR BND LNG 29CM

## (undated) DEVICE — CONN JET HYDRA H20 AUXILIARY DISP

## (undated) DEVICE — INTRO GLIDESHEATH SLENDER SS 21G .021 6F 10CM 45CM

## (undated) DEVICE — MSK AIRWY LARYNG LMA UNIQUE STD PK SZ4

## (undated) DEVICE — Device: Brand: DEFENDO AIR/WATER/SUCTION AND BIOPSY VALVE

## (undated) DEVICE — SINGLE USE BIOPSY VALVE MAJ-210: Brand: SINGLE USE BIOPSY VALVE (STERILE)